# Patient Record
Sex: FEMALE | Race: WHITE | Employment: OTHER | URBAN - METROPOLITAN AREA
[De-identification: names, ages, dates, MRNs, and addresses within clinical notes are randomized per-mention and may not be internally consistent; named-entity substitution may affect disease eponyms.]

---

## 2017-04-04 ENCOUNTER — APPOINTMENT (OUTPATIENT)
Dept: GENERAL RADIOLOGY | Age: 82
DRG: 176 | End: 2017-04-04
Attending: EMERGENCY MEDICINE
Payer: COMMERCIAL

## 2017-04-04 ENCOUNTER — APPOINTMENT (OUTPATIENT)
Dept: CT IMAGING | Age: 82
DRG: 176 | End: 2017-04-04
Attending: EMERGENCY MEDICINE
Payer: COMMERCIAL

## 2017-04-04 ENCOUNTER — HOSPITAL ENCOUNTER (INPATIENT)
Age: 82
LOS: 10 days | Discharge: NURSING, REHAB OR LONG-TERM CARE | DRG: 176 | End: 2017-04-14
Attending: EMERGENCY MEDICINE | Admitting: INTERNAL MEDICINE
Payer: COMMERCIAL

## 2017-04-04 DIAGNOSIS — I48.92 ATRIAL FLUTTER WITH RAPID VENTRICULAR RESPONSE (HCC): ICD-10-CM

## 2017-04-04 DIAGNOSIS — I26.99 PE (PULMONARY THROMBOEMBOLISM) (HCC): ICD-10-CM

## 2017-04-04 DIAGNOSIS — N17.9 ACUTE RENAL FAILURE, UNSPECIFIED ACUTE RENAL FAILURE TYPE (HCC): ICD-10-CM

## 2017-04-04 DIAGNOSIS — Z66 DO NOT RESUSCITATE: Chronic | ICD-10-CM

## 2017-04-04 DIAGNOSIS — I48.91 ATRIAL FIBRILLATION, UNSPECIFIED TYPE (HCC): ICD-10-CM

## 2017-04-04 DIAGNOSIS — J90 PLEURAL EFFUSION: ICD-10-CM

## 2017-04-04 DIAGNOSIS — R09.02 HYPOXEMIA: ICD-10-CM

## 2017-04-04 DIAGNOSIS — I26.99 OTHER ACUTE PULMONARY EMBOLISM: Primary | ICD-10-CM

## 2017-04-04 DIAGNOSIS — E83.42 HYPOMAGNESEMIA: ICD-10-CM

## 2017-04-04 DIAGNOSIS — J45.909 UNCOMPLICATED ASTHMA, UNSPECIFIED ASTHMA SEVERITY: ICD-10-CM

## 2017-04-04 DIAGNOSIS — R10.30 LOWER ABDOMINAL PAIN: ICD-10-CM

## 2017-04-04 DIAGNOSIS — R53.81 DEBILITY: ICD-10-CM

## 2017-04-04 LAB
ALBUMIN SERPL BCP-MCNC: 3.8 G/DL (ref 3.2–4.6)
ALBUMIN/GLOB SERPL: 1.1 {RATIO} (ref 1.2–3.5)
ALP SERPL-CCNC: 74 U/L (ref 50–136)
ALT SERPL-CCNC: 37 U/L (ref 12–65)
ANION GAP BLD CALC-SCNC: 6 MMOL/L (ref 7–16)
APTT PPP: >110 SEC (ref 23.5–31.7)
AST SERPL W P-5'-P-CCNC: 41 U/L (ref 15–37)
ATRIAL RATE: 158 BPM
ATRIAL RATE: 158 BPM
BASOPHILS # BLD AUTO: 0 K/UL (ref 0–0.2)
BASOPHILS # BLD: 0 % (ref 0–2)
BILIRUB SERPL-MCNC: 0.6 MG/DL (ref 0.2–1.1)
BNP SERPL-MCNC: 587 PG/ML
BUN SERPL-MCNC: 21 MG/DL (ref 8–23)
CALCIUM SERPL-MCNC: 8.3 MG/DL (ref 8.3–10.4)
CALCULATED P AXIS, ECG09: -131 DEGREES
CALCULATED P AXIS, ECG09: 143 DEGREES
CALCULATED R AXIS, ECG10: 100 DEGREES
CALCULATED R AXIS, ECG10: 99 DEGREES
CALCULATED T AXIS, ECG11: -101 DEGREES
CALCULATED T AXIS, ECG11: -164 DEGREES
CHLORIDE SERPL-SCNC: 108 MMOL/L (ref 98–107)
CO2 SERPL-SCNC: 30 MMOL/L (ref 21–32)
CREAT SERPL-MCNC: 1.21 MG/DL (ref 0.6–1)
D DIMER PPP FEU-MCNC: 6.32 UG/ML(FEU)
DIAGNOSIS, 93000: NORMAL
DIAGNOSIS, 93000: NORMAL
DIFFERENTIAL METHOD BLD: ABNORMAL
EOSINOPHIL # BLD: 0 K/UL (ref 0–0.8)
EOSINOPHIL NFR BLD: 1 % (ref 0.5–7.8)
ERYTHROCYTE [DISTWIDTH] IN BLOOD BY AUTOMATED COUNT: 14.3 % (ref 11.9–14.6)
GLOBULIN SER CALC-MCNC: 3.4 G/DL (ref 2.3–3.5)
GLUCOSE BLD STRIP.AUTO-MCNC: 168 MG/DL (ref 65–100)
GLUCOSE SERPL-MCNC: 133 MG/DL (ref 65–100)
HCT VFR BLD AUTO: 34.2 % (ref 35.8–46.3)
HGB BLD-MCNC: 10.8 G/DL (ref 11.7–15.4)
IMM GRANULOCYTES # BLD: 0 K/UL (ref 0–0.5)
IMM GRANULOCYTES NFR BLD AUTO: 0 % (ref 0–5)
INR PPP: 1.3 (ref 0.9–1.2)
LYMPHOCYTES # BLD AUTO: 18 % (ref 13–44)
LYMPHOCYTES # BLD: 1.1 K/UL (ref 0.5–4.6)
MAGNESIUM SERPL-MCNC: 1.6 MG/DL (ref 1.8–2.4)
MCH RBC QN AUTO: 32.3 PG (ref 26.1–32.9)
MCHC RBC AUTO-ENTMCNC: 31.6 G/DL (ref 31.4–35)
MCV RBC AUTO: 102.4 FL (ref 79.6–97.8)
MONOCYTES # BLD: 0.3 K/UL (ref 0.1–1.3)
MONOCYTES NFR BLD AUTO: 6 % (ref 4–12)
NEUTS SEG # BLD: 4.3 K/UL (ref 1.7–8.2)
NEUTS SEG NFR BLD AUTO: 75 % (ref 43–78)
P-R INTERVAL, ECG05: 128 MS
P-R INTERVAL, ECG05: 132 MS
PHOSPHATE SERPL-MCNC: 3.1 MG/DL (ref 2.3–3.7)
PLATELET # BLD AUTO: 169 K/UL (ref 150–450)
PMV BLD AUTO: 9.5 FL (ref 10.8–14.1)
POTASSIUM SERPL-SCNC: 4.1 MMOL/L (ref 3.5–5.1)
PROT SERPL-MCNC: 7.2 G/DL (ref 6.3–8.2)
PROTHROMBIN TIME: 14.5 SEC (ref 9.6–12)
Q-T INTERVAL, ECG07: 258 MS
Q-T INTERVAL, ECG07: 258 MS
QRS DURATION, ECG06: 80 MS
QRS DURATION, ECG06: 80 MS
QTC CALCULATION (BEZET), ECG08: 418 MS
QTC CALCULATION (BEZET), ECG08: 418 MS
RBC # BLD AUTO: 3.34 M/UL (ref 4.05–5.25)
SODIUM SERPL-SCNC: 144 MMOL/L (ref 136–145)
TROPONIN I BLD-MCNC: 0.04 NG/ML (ref 0–0.08)
VENTRICULAR RATE, ECG03: 158 BPM
VENTRICULAR RATE, ECG03: 158 BPM
WBC # BLD AUTO: 5.8 K/UL (ref 4.3–11.1)

## 2017-04-04 PROCEDURE — 77030014007 HC SPNG HEMSTAT J&J -B

## 2017-04-04 PROCEDURE — 74011000250 HC RX REV CODE- 250: Performed by: INTERNAL MEDICINE

## 2017-04-04 PROCEDURE — 94760 N-INVAS EAR/PLS OXIMETRY 1: CPT

## 2017-04-04 PROCEDURE — 96367 TX/PROPH/DG ADDL SEQ IV INF: CPT | Performed by: EMERGENCY MEDICINE

## 2017-04-04 PROCEDURE — 83735 ASSAY OF MAGNESIUM: CPT | Performed by: EMERGENCY MEDICINE

## 2017-04-04 PROCEDURE — 93005 ELECTROCARDIOGRAM TRACING: CPT | Performed by: EMERGENCY MEDICINE

## 2017-04-04 PROCEDURE — 74011250636 HC RX REV CODE- 250/636: Performed by: EMERGENCY MEDICINE

## 2017-04-04 PROCEDURE — 74011636320 HC RX REV CODE- 636/320: Performed by: EMERGENCY MEDICINE

## 2017-04-04 PROCEDURE — 74011000258 HC RX REV CODE- 258: Performed by: EMERGENCY MEDICINE

## 2017-04-04 PROCEDURE — 85379 FIBRIN DEGRADATION QUANT: CPT | Performed by: EMERGENCY MEDICINE

## 2017-04-04 PROCEDURE — 77030013579 HC DRSG WND CA ALG BMS -A

## 2017-04-04 PROCEDURE — 82962 GLUCOSE BLOOD TEST: CPT

## 2017-04-04 PROCEDURE — 77010033678 HC OXYGEN DAILY

## 2017-04-04 PROCEDURE — 85610 PROTHROMBIN TIME: CPT | Performed by: EMERGENCY MEDICINE

## 2017-04-04 PROCEDURE — 80053 COMPREHEN METABOLIC PANEL: CPT | Performed by: EMERGENCY MEDICINE

## 2017-04-04 PROCEDURE — 94640 AIRWAY INHALATION TREATMENT: CPT

## 2017-04-04 PROCEDURE — 74011000250 HC RX REV CODE- 250: Performed by: EMERGENCY MEDICINE

## 2017-04-04 PROCEDURE — 85730 THROMBOPLASTIN TIME PARTIAL: CPT | Performed by: EMERGENCY MEDICINE

## 2017-04-04 PROCEDURE — 71010 XR CHEST PORT: CPT

## 2017-04-04 PROCEDURE — 74011000258 HC RX REV CODE- 258: Performed by: NURSE PRACTITIONER

## 2017-04-04 PROCEDURE — 99285 EMERGENCY DEPT VISIT HI MDM: CPT | Performed by: EMERGENCY MEDICINE

## 2017-04-04 PROCEDURE — 83880 ASSAY OF NATRIURETIC PEPTIDE: CPT | Performed by: EMERGENCY MEDICINE

## 2017-04-04 PROCEDURE — 84100 ASSAY OF PHOSPHORUS: CPT | Performed by: EMERGENCY MEDICINE

## 2017-04-04 PROCEDURE — 96365 THER/PROPH/DIAG IV INF INIT: CPT | Performed by: EMERGENCY MEDICINE

## 2017-04-04 PROCEDURE — 36415 COLL VENOUS BLD VENIPUNCTURE: CPT | Performed by: EMERGENCY MEDICINE

## 2017-04-04 PROCEDURE — 74011250636 HC RX REV CODE- 250/636: Performed by: INTERNAL MEDICINE

## 2017-04-04 PROCEDURE — 74011250637 HC RX REV CODE- 250/637: Performed by: NURSE PRACTITIONER

## 2017-04-04 PROCEDURE — 74011250636 HC RX REV CODE- 250/636: Performed by: NURSE PRACTITIONER

## 2017-04-04 PROCEDURE — 84484 ASSAY OF TROPONIN QUANT: CPT

## 2017-04-04 PROCEDURE — 96375 TX/PRO/DX INJ NEW DRUG ADDON: CPT | Performed by: EMERGENCY MEDICINE

## 2017-04-04 PROCEDURE — 65660000000 HC RM CCU STEPDOWN

## 2017-04-04 PROCEDURE — 71260 CT THORAX DX C+: CPT

## 2017-04-04 PROCEDURE — 96361 HYDRATE IV INFUSION ADD-ON: CPT | Performed by: EMERGENCY MEDICINE

## 2017-04-04 PROCEDURE — 74011250637 HC RX REV CODE- 250/637: Performed by: INTERNAL MEDICINE

## 2017-04-04 PROCEDURE — 99223 1ST HOSP IP/OBS HIGH 75: CPT | Performed by: INTERNAL MEDICINE

## 2017-04-04 PROCEDURE — 85025 COMPLETE CBC W/AUTO DIFF WBC: CPT | Performed by: EMERGENCY MEDICINE

## 2017-04-04 PROCEDURE — 74011636637 HC RX REV CODE- 636/637: Performed by: INTERNAL MEDICINE

## 2017-04-04 PROCEDURE — 76450000000

## 2017-04-04 RX ORDER — AMIODARONE HYDROCHLORIDE 150 MG/3ML
150 INJECTION, SOLUTION INTRAVENOUS
Status: COMPLETED | OUTPATIENT
Start: 2017-04-04 | End: 2017-04-04

## 2017-04-04 RX ORDER — SODIUM CHLORIDE 0.9 % (FLUSH) 0.9 %
5-10 SYRINGE (ML) INJECTION AS NEEDED
Status: DISCONTINUED | OUTPATIENT
Start: 2017-04-04 | End: 2017-04-05 | Stop reason: SDUPTHER

## 2017-04-04 RX ORDER — LISINOPRIL 20 MG/1
20 TABLET ORAL DAILY
Status: DISCONTINUED | OUTPATIENT
Start: 2017-04-05 | End: 2017-04-06

## 2017-04-04 RX ORDER — LEVALBUTEROL INHALATION SOLUTION 0.63 MG/3ML
0.63 SOLUTION RESPIRATORY (INHALATION)
Status: DISCONTINUED | OUTPATIENT
Start: 2017-04-04 | End: 2017-04-14 | Stop reason: HOSPADM

## 2017-04-04 RX ORDER — ADENOSINE 3 MG/ML
6 INJECTION, SOLUTION INTRAVENOUS ONCE
Status: COMPLETED | OUTPATIENT
Start: 2017-04-04 | End: 2017-04-04

## 2017-04-04 RX ORDER — SODIUM CHLORIDE 0.9 % (FLUSH) 0.9 %
5-10 SYRINGE (ML) INJECTION EVERY 8 HOURS
Status: DISCONTINUED | OUTPATIENT
Start: 2017-04-04 | End: 2017-04-05 | Stop reason: SDUPTHER

## 2017-04-04 RX ORDER — HYDROCHLOROTHIAZIDE 25 MG/1
25 TABLET ORAL DAILY
Status: DISCONTINUED | OUTPATIENT
Start: 2017-04-05 | End: 2017-04-07

## 2017-04-04 RX ORDER — FUROSEMIDE 10 MG/ML
40 INJECTION INTRAMUSCULAR; INTRAVENOUS ONCE
Status: COMPLETED | OUTPATIENT
Start: 2017-04-04 | End: 2017-04-04

## 2017-04-04 RX ORDER — DILTIAZEM HYDROCHLORIDE 5 MG/ML
10 INJECTION INTRAVENOUS ONCE
Status: COMPLETED | OUTPATIENT
Start: 2017-04-04 | End: 2017-04-04

## 2017-04-04 RX ORDER — ATORVASTATIN CALCIUM 40 MG/1
40 TABLET, FILM COATED ORAL
Status: DISCONTINUED | OUTPATIENT
Start: 2017-04-04 | End: 2017-04-14 | Stop reason: HOSPADM

## 2017-04-04 RX ORDER — SODIUM CHLORIDE 0.9 % (FLUSH) 0.9 %
5-10 SYRINGE (ML) INJECTION AS NEEDED
Status: DISCONTINUED | OUTPATIENT
Start: 2017-04-04 | End: 2017-04-14 | Stop reason: HOSPADM

## 2017-04-04 RX ORDER — HEPARIN SODIUM 5000 [USP'U]/ML
4000 INJECTION, SOLUTION INTRAVENOUS; SUBCUTANEOUS
Status: COMPLETED | OUTPATIENT
Start: 2017-04-04 | End: 2017-04-04

## 2017-04-04 RX ORDER — LORATADINE 10 MG/1
10 TABLET ORAL DAILY
Status: DISCONTINUED | OUTPATIENT
Start: 2017-04-05 | End: 2017-04-14 | Stop reason: HOSPADM

## 2017-04-04 RX ORDER — LORAZEPAM 2 MG/ML
1 INJECTION INTRAMUSCULAR
Status: DISCONTINUED | OUTPATIENT
Start: 2017-04-04 | End: 2017-04-10

## 2017-04-04 RX ORDER — MAGNESIUM SULFATE 1 G/100ML
1 INJECTION INTRAVENOUS ONCE
Status: COMPLETED | OUTPATIENT
Start: 2017-04-04 | End: 2017-04-04

## 2017-04-04 RX ORDER — ACETAMINOPHEN 325 MG/1
650 TABLET ORAL
Status: DISCONTINUED | OUTPATIENT
Start: 2017-04-04 | End: 2017-04-14 | Stop reason: HOSPADM

## 2017-04-04 RX ORDER — HEPARIN SODIUM 5000 [USP'U]/100ML
18-36 INJECTION, SOLUTION INTRAVENOUS
Status: DISCONTINUED | OUTPATIENT
Start: 2017-04-04 | End: 2017-04-05

## 2017-04-04 RX ORDER — CALCIUM GLUCONATE 94 MG/ML
1 INJECTION, SOLUTION INTRAVENOUS
Status: COMPLETED | OUTPATIENT
Start: 2017-04-04 | End: 2017-04-04

## 2017-04-04 RX ORDER — SODIUM CHLORIDE 0.9 % (FLUSH) 0.9 %
10 SYRINGE (ML) INJECTION
Status: COMPLETED | OUTPATIENT
Start: 2017-04-04 | End: 2017-04-04

## 2017-04-04 RX ORDER — NIACIN 500 MG/1
500 TABLET, EXTENDED RELEASE ORAL
Status: DISCONTINUED | OUTPATIENT
Start: 2017-04-04 | End: 2017-04-14 | Stop reason: HOSPADM

## 2017-04-04 RX ORDER — SODIUM CHLORIDE 0.9 % (FLUSH) 0.9 %
5-10 SYRINGE (ML) INJECTION EVERY 8 HOURS
Status: DISCONTINUED | OUTPATIENT
Start: 2017-04-04 | End: 2017-04-14 | Stop reason: HOSPADM

## 2017-04-04 RX ORDER — AMIODARONE HYDROCHLORIDE 150 MG/3ML
INJECTION, SOLUTION INTRAVENOUS
Status: ACTIVE
Start: 2017-04-04 | End: 2017-04-05

## 2017-04-04 RX ORDER — SODIUM CHLORIDE 9 MG/ML
2000 INJECTION, SOLUTION INTRAVENOUS ONCE
Status: COMPLETED | OUTPATIENT
Start: 2017-04-04 | End: 2017-04-04

## 2017-04-04 RX ORDER — PANTOPRAZOLE SODIUM 40 MG/1
40 TABLET, DELAYED RELEASE ORAL
Status: DISCONTINUED | OUTPATIENT
Start: 2017-04-05 | End: 2017-04-14 | Stop reason: HOSPADM

## 2017-04-04 RX ORDER — METFORMIN HYDROCHLORIDE 500 MG/1
500 TABLET ORAL 2 TIMES DAILY
Status: DISCONTINUED | OUTPATIENT
Start: 2017-04-04 | End: 2017-04-04

## 2017-04-04 RX ADMIN — AMIODARONE HYDROCHLORIDE 150 MG: 50 INJECTION, SOLUTION INTRAVENOUS at 13:03

## 2017-04-04 RX ADMIN — AMIODARONE HYDROCHLORIDE 0.5 MG/MIN: 50 INJECTION, SOLUTION INTRAVENOUS at 22:22

## 2017-04-04 RX ADMIN — ACETAMINOPHEN 650 MG: 325 TABLET, FILM COATED ORAL at 23:08

## 2017-04-04 RX ADMIN — ATORVASTATIN CALCIUM 40 MG: 40 TABLET, FILM COATED ORAL at 23:08

## 2017-04-04 RX ADMIN — DILTIAZEM HYDROCHLORIDE 10 MG: 5 INJECTION INTRAVENOUS at 12:22

## 2017-04-04 RX ADMIN — HEPARIN SODIUM AND DEXTROSE 18 UNITS/KG/HR: 5000; 5 INJECTION INTRAVENOUS at 15:27

## 2017-04-04 RX ADMIN — IOPAMIDOL 100 ML: 755 INJECTION, SOLUTION INTRAVENOUS at 13:54

## 2017-04-04 RX ADMIN — AMIODARONE HYDROCHLORIDE 1 MG/MIN: 50 INJECTION, SOLUTION INTRAVENOUS at 14:08

## 2017-04-04 RX ADMIN — AMIODARONE HYDROCHLORIDE 150 MG: 50 INJECTION, SOLUTION INTRAVENOUS at 13:13

## 2017-04-04 RX ADMIN — SODIUM CHLORIDE 2000 ML/HR: 900 INJECTION, SOLUTION INTRAVENOUS at 12:43

## 2017-04-04 RX ADMIN — Medication 5 ML: at 23:08

## 2017-04-04 RX ADMIN — FUROSEMIDE 40 MG: 10 INJECTION, SOLUTION INTRAMUSCULAR; INTRAVENOUS at 17:16

## 2017-04-04 RX ADMIN — MAGNESIUM SULFATE HEPTAHYDRATE 1 G: 1 INJECTION, SOLUTION INTRAVENOUS at 14:11

## 2017-04-04 RX ADMIN — Medication 5 ML: at 14:15

## 2017-04-04 RX ADMIN — Medication 10 ML: at 13:54

## 2017-04-04 RX ADMIN — HEPARIN SODIUM 4000 UNITS: 5000 INJECTION, SOLUTION INTRAVENOUS; SUBCUTANEOUS at 15:26

## 2017-04-04 RX ADMIN — SODIUM CHLORIDE 100 ML: 900 INJECTION, SOLUTION INTRAVENOUS at 13:54

## 2017-04-04 RX ADMIN — HUMAN INSULIN 2 UNITS: 100 INJECTION, SOLUTION SUBCUTANEOUS at 23:08

## 2017-04-04 RX ADMIN — ADENOSINE 6 MG: 3 INJECTION, SOLUTION INTRAVENOUS at 12:38

## 2017-04-04 RX ADMIN — LEVALBUTEROL HYDROCHLORIDE 0.63 MG: 0.63 SOLUTION RESPIRATORY (INHALATION) at 22:18

## 2017-04-04 RX ADMIN — CALCIUM GLUCONATE 1 G: 94 INJECTION, SOLUTION INTRAVENOUS at 13:24

## 2017-04-04 RX ADMIN — NIACIN 500 MG: 500 TABLET, FILM COATED, EXTENDED RELEASE ORAL at 23:08

## 2017-04-04 NOTE — ED TRIAGE NOTES
Pt reports right shoulder pain for an undetermined amount of time. Pt denies any trauma. Pt describes the pain as someone pulling her shoulder apart.

## 2017-04-04 NOTE — IP AVS SNAPSHOT
Current Discharge Medication List  
  
START taking these medications Dose & Instructions Dispensing Information Comments Morning Noon Evening Bedtime  
 amiodarone 200 mg tablet Commonly known as:  CORDARONE Your last dose was: Your next dose is:    
   
   
 Dose:  200 mg Take 1 Tab by mouth every twelve (12) hours. Quantity:  60 Tab Refills:  11  
     
   
   
   
  
 apixaban 5 mg tablet Commonly known as:  Bedmelony Doll Your last dose was: Your next dose is:    
   
   
 Dose:  5 mg Take 1 Tab by mouth every twelve (12) hours. Quantity:  60 Tab Refills:  11  
     
   
   
   
  
 docusate sodium 100 mg capsule Commonly known as:  Charlemont Given Your last dose was: Your next dose is:    
   
   
 Dose:  100 mg Take 1 Cap by mouth two (2) times a day. Quantity:  60 Cap Refills:  11  
     
   
   
   
  
 levalbuterol 0.63 mg/3 mL Nebu Commonly known as:  Harlo Slates Your last dose was: Your next dose is:    
   
   
 Dose:  0.63 mg  
3 mL by Nebulization route every six (6) hours as needed. Quantity:  [address] Refills:  11  
     
   
   
   
  
 metoprolol tartrate 25 mg tablet Commonly known as:  LOPRESSOR Your last dose was: Your next dose is:    
   
   
 Dose:  25 mg Take 1 Tab by mouth every six (6) hours. Quantity:  120 Tab Refills:  11  
     
   
   
   
  
 sodium chloride 0.65 % nasal spray Commonly known as:  OCEAN Your last dose was: Your next dose is:    
   
   
 Dose:  1 Spray 1 [place] by Both Nostrils route as needed for Congestion (Keep at bedside for PRN use.). Quantity:  15 mL Refills:  11 CONTINUE these medications which have NOT CHANGED Dose & Instructions Dispensing Information Comments Morning Noon Evening Bedtime  
 acetaminophen 650 mg CR tablet Commonly known as:  TYLENOL Your last dose was: Your next dose is:    
   
   
 Dose:  650 mg Take 1 Tab by mouth every eight (8) hours. 2 TABS Quantity:  90 Tab Refills:  11  
     
   
   
   
  
 atorvastatin 40 mg tablet Commonly known as:  LIPITOR Your last dose was: Your next dose is:    
   
   
 Dose:  40 mg Take 1 Tab by mouth nightly. Quantity:  90 Tab Refills:  3 CALCIUM-VITAMIN D2 PO Your last dose was: Your next dose is: Take  by mouth three (3) times daily. Refills:  0  
     
   
   
   
  
 * CERTAVITE SENIOR-ANTIOXIDANT 0.4-300-250 mg-mcg-mcg Tab Generic drug:  multivit-min-FA-lycopen-lutein Your last dose was: Your next dose is: Take  by mouth daily. Refills:  0  
     
   
   
   
  
 * CERTAVITE SENIOR-ANTIOXIDANT 0.4-300-250 mg-mcg-mcg Tab Generic drug:  multivit-min-FA-lycopen-lutein Your last dose was: Your next dose is: Take 1 Tab by mouth daily. Quantity:  90 Tab Refills:  3 ENTERIC COATED ASPIRIN PO Your last dose was: Your next dose is:    
   
   
 Dose:  81 mg Take 81 mg by mouth daily. Refills:  0  
     
   
   
   
  
 fluticasone-salmeterol 250-50 mcg/dose diskus inhaler Commonly known as:  ADVAIR DISKUS Your last dose was: Your next dose is:    
   
   
 Dose:  1 Puff Take 1 Puff by inhalation two (2) times a day. Quantity:  1 Inhaler Refills:  11  
     
   
   
   
  
 loratadine 10 mg tablet Commonly known as:  Steve Landon Your last dose was: Your next dose is:    
   
   
 Dose:  10 mg Take 1 Tab by mouth daily. Quantity:  90 Tab Refills:  3  
     
   
   
   
  
 niacin  mg tablet Commonly known as:  Niaspan Your last dose was: Your next dose is:    
   
   
 Dose:  500 mg Take 1 Tab by mouth nightly. Quantity:  90 Tab Refills:  3 pantoprazole 20 mg tablet Commonly known as:  PROTONIX Your last dose was: Your next dose is:    
   
   
 Dose:  20 mg Take 1 Tab by mouth daily. Indications: HEARTBURN Quantity:  90 Tab Refills:  2  
     
   
   
   
  
 * Notice: This list has 2 medication(s) that are the same as other medications prescribed for you. Read the directions carefully, and ask your doctor or other care provider to review them with you. STOP taking these medications   
 amLODIPine 2.5 mg tablet Commonly known as:  NORVASC  
   
  
 hydroCHLOROthiazide 25 mg tablet Commonly known as:  HYDRODIURIL  
   
  
 lisinopril 20 mg tablet Commonly known as:  PRINIVIL, ZESTRIL  
   
  
 metFORMIN 500 mg tablet Commonly known as:  GLUCOPHAGE Where to Get Your Medications These medications were sent to [address] [place] [address] Phone:  [phone]  
  amiodarone 200 mg tablet  
 apixaban 5 mg tablet  
 docusate sodium 100 mg capsule  
 levalbuterol 0.63 mg/3 mL Nebu  
 metoprolol tartrate 25 mg tablet  
 sodium chloride 0.65 % nasal spray

## 2017-04-04 NOTE — ED PROVIDER NOTES
HPI Comments: 29-year-old female presents with shortness of breath the past several days. She was seen by her doctor yesterday and told she had \"something wrong with her heart. \"  She has had a cough for the past 2-4 weeks with increasing bilateral leg swelling. Her doctor prescribed Lasix, but she has not yet been to the pharmacy to pick this up. She denies any chest pain, does have right shoulder pain for 2 weeks with movement only. She denies any chest pain or fever. Cough is occasionally productive of clear and white sputum. She denies fevers but has had sweats. Patient is a 80 y.o. female presenting with shoulder pain. The history is provided by the patient and a relative. Shoulder Pain           Past Medical History:   Diagnosis Date    Asthma 7/1/2015    Heartburn 7/1/2015    Hyperlipidemia 7/1/2015    Hypertension 7/1/2015    Type 2 diabetes mellitus (HCC)     Urge incontinence 7/1/2015       Past Surgical History:   Procedure Laterality Date    HX CATARACT REMOVAL  2011         Family History:   Problem Relation Age of Onset    Diabetes Father     Diabetes Other     Cancer Other     Breast Cancer Child      2 daughters had breast cancer    Breast Cancer Sister        Social History     Social History    Marital status: SINGLE     Spouse name: N/A    Number of children: N/A    Years of education: N/A     Occupational History    Not on file. Social History Main Topics    Smoking status: Never Smoker    Smokeless tobacco: Never Used    Alcohol use No    Drug use: No    Sexual activity: Not on file     Other Topics Concern    Not on file     Social History Narrative         ALLERGIES: Review of patient's allergies indicates no known allergies. Review of Systems   Constitutional: Positive for diaphoresis and fatigue. Negative for chills and fever. HENT: Negative for hearing loss. Eyes: Negative for visual disturbance.    Respiratory: Positive for cough and shortness of breath. Cardiovascular: Negative for chest pain and palpitations. Gastrointestinal: Negative for abdominal pain, diarrhea, nausea and vomiting. Musculoskeletal: Positive for arthralgias. Negative for back pain and joint swelling. Skin: Negative for rash. Neurological: Negative for weakness and headaches. Psychiatric/Behavioral: Negative for confusion. Vitals:    04/04/17 1157 04/04/17 1225 04/04/17 1238 04/04/17 1245   BP: 115/78 108/75 (!) 85/57 103/71   Pulse: (!) 160 (!) 156 (!) 157 (!) 155   Resp: 20 22 28   Temp: 98 °F (36.7 °C)      SpO2: 99% 94%  96%   Weight: 63.5 kg (140 lb)      Height: 5' 1\" (1.549 m)               Physical Exam   Constitutional: She appears well-developed and well-nourished. HENT:   Head: Normocephalic and atraumatic. Right Ear: External ear normal.   Left Ear: External ear normal.   Nose: Nose normal.   Mouth/Throat: Oropharynx is clear and moist.   Eyes: Conjunctivae are normal. Pupils are equal, round, and reactive to light. Neck: Normal range of motion. Neck supple. Cardiovascular: Regular rhythm, normal heart sounds and intact distal pulses. Tachycardia present. Pulmonary/Chest: Effort normal and breath sounds normal. No respiratory distress. She has no wheezes. Abdominal: Soft. Bowel sounds are normal. She exhibits no distension. There is no tenderness. Musculoskeletal: Normal range of motion. She exhibits edema. 2+ bilateral pitting edema   Neurological: She is alert. Skin: Skin is warm and dry. No rash noted. Psychiatric: Judgment normal.   Nursing note and vitals reviewed. MDM  Number of Diagnoses or Management Options  Diagnosis management comments: Parts of this document were created using dragon voice recognition software. The chart has been reviewed but errors may still be present. Initial rate 150s to 160s with suspicion for atrial flutter.   Initially given Cardizem with drop in blood pressure to the 80s, but no improvement in rate. Then given adenosine which uncovered atrial flutter with variable block. Rate quickly ascended back again to the 150s. Given amiodarone with improvement in rate 130s. D-dimer elevated, but patient was not stable enough to go to CT. Discussed with cardiologist, Dr. Bibiana Lino. He suggested second bolus of amiodarone followed by amiodarone drip in order to obtain CT scan. Mag replaced.    ===================================================================  This patient is critically ill and there is a high probability of of imminent or life threatening deterioration in the patient's condition without immediate management. The nature of the patient's clinical problem is: aflutter with rapid rate    I have spent 45 minutes in direct patient care, documentation, review of labs/xrays/old records, discussion with Family, staff . The time involved in the performance of separately reportable procedures was not counted toward critical care time.      Willow Suárez MD; 4/4/2017 @1:18 PM  ===================================================================           Amount and/or Complexity of Data Reviewed  Clinical lab tests: ordered and reviewed (Results for orders placed or performed during the hospital encounter of 04/04/17  -CBC WITH AUTOMATED DIFF       Result                                            Value                         Ref Range                       WBC                                               5.8                           4.3 - 11.1 K/uL                 RBC                                               3.34 (L)                      4.05 - 5.25 M/uL                HGB                                               10.8 (L)                      11.7 - 15.4 g/dL                HCT                                               34.2 (L)                      35.8 - 46.3 %                   MCV                                               102.4 (H)                     79.6 - 97.8 FL                  MCH                                               32.3                          26.1 - 32.9 PG                  MCHC                                              31.6                          31.4 - 35.0 g/dL                RDW                                               14.3                          11.9 - 14.6 %                   PLATELET                                          169                           150 - 450 K/uL                  MPV                                               9.5 (L)                       10.8 - 14.1 FL                  DF                                                AUTOMATED                                                     NEUTROPHILS                                       75                            43 - 78 %                       LYMPHOCYTES                                       18                            13 - 44 %                       MONOCYTES                                         6                             4.0 - 12.0 %                    EOSINOPHILS                                       1                             0.5 - 7.8 %                     BASOPHILS                                         0                             0.0 - 2.0 %                     IMMATURE GRANULOCYTES                             0.0                           0.0 - 5.0 %                     ABS. NEUTROPHILS                                  4.3                           1.7 - 8.2 K/UL                  ABS. LYMPHOCYTES                                  1.1                           0.5 - 4.6 K/UL                  ABS. MONOCYTES                                    0.3                           0.1 - 1.3 K/UL                  ABS. EOSINOPHILS                                  0.0                           0.0 - 0.8 K/UL                  ABS. BASOPHILS                                    0.0                           0.0 - 0.2 K/UL                  ABS. IMM. GRANS. 0.0                           0.0 - 0.5 K/UL             -METABOLIC PANEL, COMPREHENSIVE       Result                                            Value                         Ref Range                       Sodium                                            144                           136 - 145 mmol/L                Potassium                                         4.1                           3.5 - 5.1 mmol/L                Chloride                                          108 (H)                       98 - 107 mmol/L                 CO2                                               30                            21 - 32 mmol/L                  Anion gap                                         6 (L)                         7 - 16 mmol/L                   Glucose                                           133 (H)                       65 - 100 mg/dL                  BUN                                               21                            8 - 23 MG/DL                    Creatinine                                        1.21 (H)                      0.6 - 1.0 MG/DL                 GFR est AA                                        54 (L)                        >60 ml/min/1.73m2               GFR est non-AA                                    44 (L)                        >60 ml/min/1.73m2               Calcium                                           8.3                           8.3 - 10.4 MG/DL                Bilirubin, total                                  0.6                           0.2 - 1.1 MG/DL                 ALT (SGPT)                                        37                            12 - 65 U/L                     AST (SGOT)                                        41 (H)                        15 - 37 U/L                     Alk. phosphatase                                  74                            50 - 136 U/L                    Protein, total 7.2                           6.3 - 8.2 g/dL                  Albumin                                           3.8                           3.2 - 4.6 g/dL                  Globulin                                          3.4                           2.3 - 3.5 g/dL                  A-G Ratio                                         1.1 (L)                       1.2 - 3.5                  -MAGNESIUM       Result                                            Value                         Ref Range                       Magnesium                                         1.6 (L)                       1.8 - 2.4 mg/dL            -PHOSPHORUS       Result                                            Value                         Ref Range                       Phosphorus                                        3.1                           2.3 - 3.7 MG/DL            -BNP       Result                                            Value                         Ref Range                       BNP                                               587                           pg/mL                      -D DIMER       Result                                            Value                         Ref Range                       D DIMER                                           6.32 (HH)                     <0.55 ug/ml(FEU)           -POC TROPONIN-I       Result                                            Value                         Ref Range                       Troponin-I (POC)                                  0.04                          0.0 - 0.08 ng/ml           -EKG, 12 LEAD, INITIAL       Result                                            Value                         Ref Range                       Ventricular Rate                                  158                           BPM                             Atrial Rate                                       158                           BPM P-R Interval                                      132                           ms                              QRS Duration                                      80                            ms                              Q-T Interval                                      258                           ms                              QTC Calculation (Bezet)                           418                           ms                              Calculated P Axis                                 143                           degrees                         Calculated R Axis                                 100                           degrees                         Calculated T Axis                                 -164                          degrees                         Diagnosis                                                                                                   !!! Suspect arm lead reversal, interpretation assumes no reversal   !! AGE AND GENDER SPECIFIC ECG ANALYSIS !!   Unusual P axis, possible ectopic atrial tachycardia   Rightward axis   Nonspecific ST and T wave abnormality   Abnormal ECG   When compared with ECG of 04-APR-2017 12:09,   No significant change was found     -EKG, 12 LEAD, SUBSEQUENT       Result                                            Value                         Ref Range                       Ventricular Rate                                  158                           BPM                             Atrial Rate                                       158                           BPM                             P-R Interval                                      128                           ms                              QRS Duration                                      80                            ms                              Q-T Interval                                      258                           ms                              QTC Calculation (Bezet) 418                           ms                              Calculated P Axis                                 -131                          degrees                         Calculated R Axis                                 99                            degrees                         Calculated T Axis                                 -101                          degrees                         Diagnosis                                                                                                   !! AGE AND GENDER SPECIFIC ECG ANALYSIS !!   Unusual P axis, possible ectopic atrial tachycardia   Rightward axis   RSR' or QR pattern in V1 suggests right ventricular conduction delay   Anterior infarct , age undetermined   Abnormal ECG   No previous ECGs available     )  Tests in the radiology section of CPT®: ordered and reviewed (Xr Chest Port    Result Date: 4/4/2017  PORTABLE CHEST, April 4, 2017 at 1252 hours CLINICAL HISTORY:  Severe chest pain. COMPARISON:  None. FINDINGS:  AP erect image demonstrates mild right basilar atelectasis/infiltrate. The left base is obscured by an overlying support device. The heart is enlarged without evidence of congestive heart failure or pneumothorax. The bony thorax appears intact on this view. IMPRESSION:  TECHNICALLY LIMITED EXAMINATION DEMONSTRATES CARDIOMEGALY WITH PATCHY RIGHT BASILAR ATELECTASIS/INFILTRATE.    )  Tests in the medicine section of CPT®: ordered and reviewed  Review and summarize past medical records: yes  Discuss the patient with other providers: yes  Independent visualization of images, tracings, or specimens: yes    Risk of Complications, Morbidity, and/or Mortality  General comments: 3:00 PM  CT shows bilateral pulmonary emboli and bilateral pleural effusions. Placed on heparin drip. Discussed with pulmonologist, Dr. Angel Galloway. Currently on amiodarone drip with heart rate around 130.   Discussed again with cardiologist. Pt and family updated.     Patient Progress  Patient progress: improved    ED Course       Procedures

## 2017-04-04 NOTE — CONSULTS
[place] Cardiology Consult     Attending Cardiologist:Dr. Chang Zuniga    Primary Cardiologist:none    Primary Care Physician:Dr. Blanco    Subjective: Pankaj Amaya is a 80 y.o. debiliated female who presented to ER today with one week history of SOB, hx of HTN, DM,  no CP. She was noted to be in atrial fibrillation with RVR. She was given IV cardizem with resulting hypotension. D dimer was elevated and CT chest revealed pulmonary embolism. She has no prior cardiac history. She has been initiated on IV amiodarone for rate control of atrial fibrillation. Past Medical History:   Diagnosis Date    Asthma 7/1/2015    Heartburn 7/1/2015    Hyperlipidemia 7/1/2015    Hypertension 7/1/2015    Type 2 diabetes mellitus (HCC)     Urge incontinence 7/1/2015      Past Surgical History:   Procedure Laterality Date    HX CATARACT REMOVAL  2011      Current Facility-Administered Medications   Medication Dose Route Frequency    sodium chloride (NS) flush 5-10 mL  5-10 mL IntraVENous Q8H    sodium chloride (NS) flush 5-10 mL  5-10 mL IntraVENous PRN    amiodarone (CORDARONE) 50 mg/mL injection        heparin 25,000 units in dextrose 500 mL infusion  18-36 Units/kg/hr IntraVENous TITRATE     Current Outpatient Prescriptions   Medication Sig    lisinopril (PRINIVIL, ZESTRIL) 20 mg tablet Take 1 Tab by mouth daily. Indications: Hypertension    CERTAVITE SENIOR-ANTIOXIDANT 0.4-300-250 mg-mcg-mcg tab Take 1 Tab by mouth daily.  CERTAVITE SENIOR-ANTIOXIDANT 0.4-300-250 mg-mcg-mcg tab     hydrochlorothiazide (HYDRODIURIL) 25 mg tablet Take 1 Tab by mouth daily.  atorvastatin (LIPITOR) 40 mg tablet Take 1 Tab by mouth nightly.  niacin ER (NIASPAN) 500 mg tablet Take 1 Tab by mouth nightly.  pantoprazole (PROTONIX) 20 mg tablet Take 1 Tab by mouth daily. Indications: HEARTBURN    amLODIPine (NORVASC) 2.5 mg tablet Take 1 Tab by mouth daily.     metFORMIN (GLUCOPHAGE) 500 mg tablet Take 1 Tab by mouth two (2) times a day.  loratadine (CLARITIN) 10 mg tablet Take 1 Tab by mouth daily.  fluticasone-salmeterol (ADVAIR DISKUS) 250-50 mcg/dose diskus inhaler Take 1 Puff by inhalation two (2) times a day.  acetaminophen (TYLENOL) 650 mg CR tablet Take 1 Tab by mouth every eight (8) hours. 2 TABS    ENTERIC COATED ASPIRIN PO Take 81 mg by mouth daily.  CALCIUM PHOSPHATE/VITAMIN D2 (CALCIUM-VITAMIN D2 PO) Take  by mouth three (3) times daily. No Known Allergies   Social History   Substance Use Topics    Smoking status: Never Smoker    Smokeless tobacco: Never Used    Alcohol use No      Family History   Problem Relation Age of Onset    Diabetes Father     Diabetes Other     Cancer Other     Breast Cancer Child      2 daughters had breast cancer    Breast Cancer Sister         Review of Systems  Gen: Denies fever, chills, +++malaise or fatigue. Appetite good. HEENT: Denies frequent headaches, dizzyness, visual disturbances, Neck pain or swallowing difficulty  Lungs: Denies shortness of breath, hx of COPD, breathing problems  Cardiovascular:as above   GI: Denies hememesis, dark tarry stools, No prior Hx of GI bleed, Denies constipation  : Denies dysuria, no complaints of frequency, nocturia  Heme: No prior bleeding disorders, no prior Cancer  Neuro: Denies prior CVA, TIA. Endocrine: no diabetes, thyroid disorders  Psychiatric: Denies anxiety, or other psychiatric illnesses. Objective:     Visit Vitals    BP 96/62 (BP 1 Location: Right arm, BP Patient Position: At rest)    Pulse (!) 131    Temp 98 °F (36.7 °C)    Resp 20    Ht 5' 1\" (1.549 m)    Wt 63.5 kg (140 lb)    SpO2 98%    BMI 26.45 kg/m2     General:chronically ill appearing. Head: Normocephalic, without obvious abnormality, atraumatic. Eyes: Conjunctivae/corneas clear. PERRL, EOMs intact  Nose:Nares normal. Septum midline. Mucosa normal. No drainage or sinus tenderness.    Throat: Lips, mucosa, and tongue normal. Teeth and gums normal.   Neck: Supple, symmetrical, trachea midline,  no carotid bruit and no JVD. Lungs:diminished in bases  Chest wall: No tenderness or deformity. Heart: irregular irregular, tachycardic   Abdomen:Soft, non-tender. Bowel sounds normal. No masses, No organomegaly. Extremities: Extremities normal, atraumatic, no cyanosis or edema. Pulses: 2+ and symmetric all extremities. Skin: Skin color, texture, turgor normal. No rashes or lesions  Lymph nodes: Cervical, supraclavicular, and axillary nodes normal  Neurologic:No focal deficits identified                 ECG: atrial fibrillation with RVR     Data Review:     Recent Results (from the past 24 hour(s))   EKG, 12 LEAD, SUBSEQUENT    Collection Time: 04/04/17 12:09 PM   Result Value Ref Range    Ventricular Rate 158 BPM    Atrial Rate 158 BPM    P-R Interval 128 ms    QRS Duration 80 ms    Q-T Interval 258 ms    QTC Calculation (Bezet) 418 ms    Calculated P Axis -131 degrees    Calculated R Axis 99 degrees    Calculated T Axis -101 degrees    Diagnosis       !! AGE AND GENDER SPECIFIC ECG ANALYSIS !!  Unusual P axis, possible ectopic atrial tachycardia  Rightward axis  RSR' or QR pattern in V1 suggests right ventricular conduction delay  Anterior infarct , age undetermined  Abnormal ECG  No previous ECGs available     CBC WITH AUTOMATED DIFF    Collection Time: 04/04/17 12:14 PM   Result Value Ref Range    WBC 5.8 4.3 - 11.1 K/uL    RBC 3.34 (L) 4.05 - 5.25 M/uL    HGB 10.8 (L) 11.7 - 15.4 g/dL    HCT 34.2 (L) 35.8 - 46.3 %    .4 (H) 79.6 - 97.8 FL    MCH 32.3 26.1 - 32.9 PG    MCHC 31.6 31.4 - 35.0 g/dL    RDW 14.3 11.9 - 14.6 %    PLATELET 334 316 - 289 K/uL    MPV 9.5 (L) 10.8 - 14.1 FL    DF AUTOMATED      NEUTROPHILS 75 43 - 78 %    LYMPHOCYTES 18 13 - 44 %    MONOCYTES 6 4.0 - 12.0 %    EOSINOPHILS 1 0.5 - 7.8 %    BASOPHILS 0 0.0 - 2.0 %    IMMATURE GRANULOCYTES 0.0 0.0 - 5.0 %    ABS. NEUTROPHILS 4.3 1.7 - 8.2 K/UL    ABS. LYMPHOCYTES 1.1 0.5 - 4.6 K/UL    ABS. MONOCYTES 0.3 0.1 - 1.3 K/UL    ABS. EOSINOPHILS 0.0 0.0 - 0.8 K/UL    ABS. BASOPHILS 0.0 0.0 - 0.2 K/UL    ABS. IMM. GRANS. 0.0 0.0 - 0.5 K/UL   METABOLIC PANEL, COMPREHENSIVE    Collection Time: 04/04/17 12:14 PM   Result Value Ref Range    Sodium 144 136 - 145 mmol/L    Potassium 4.1 3.5 - 5.1 mmol/L    Chloride 108 (H) 98 - 107 mmol/L    CO2 30 21 - 32 mmol/L    Anion gap 6 (L) 7 - 16 mmol/L    Glucose 133 (H) 65 - 100 mg/dL    BUN 21 8 - 23 MG/DL    Creatinine 1.21 (H) 0.6 - 1.0 MG/DL    GFR est AA 54 (L) >60 ml/min/1.73m2    GFR est non-AA 44 (L) >60 ml/min/1.73m2    Calcium 8.3 8.3 - 10.4 MG/DL    Bilirubin, total 0.6 0.2 - 1.1 MG/DL    ALT (SGPT) 37 12 - 65 U/L    AST (SGOT) 41 (H) 15 - 37 U/L    Alk. phosphatase 74 50 - 136 U/L    Protein, total 7.2 6.3 - 8.2 g/dL    Albumin 3.8 3.2 - 4.6 g/dL    Globulin 3.4 2.3 - 3.5 g/dL    A-G Ratio 1.1 (L) 1.2 - 3.5     MAGNESIUM    Collection Time: 04/04/17 12:14 PM   Result Value Ref Range    Magnesium 1.6 (L) 1.8 - 2.4 mg/dL   PHOSPHORUS    Collection Time: 04/04/17 12:14 PM   Result Value Ref Range    Phosphorus 3.1 2.3 - 3.7 MG/DL   BNP    Collection Time: 04/04/17 12:14 PM   Result Value Ref Range     pg/mL   D DIMER    Collection Time: 04/04/17 12:14 PM   Result Value Ref Range    D DIMER 6.32 (HH) <0.55 ug/ml(FEU)   POC TROPONIN-I    Collection Time: 04/04/17 12:17 PM   Result Value Ref Range    Troponin-I (POC) 0.04 0.0 - 0.08 ng/ml   EKG, 12 LEAD, INITIAL    Collection Time: 04/04/17 12:30 PM   Result Value Ref Range    Ventricular Rate 158 BPM    Atrial Rate 158 BPM    P-R Interval 132 ms    QRS Duration 80 ms    Q-T Interval 258 ms    QTC Calculation (Bezet) 418 ms    Calculated P Axis 143 degrees    Calculated R Axis 100 degrees    Calculated T Axis -164 degrees    Diagnosis       !!!  Suspect arm lead reversal, interpretation assumes no reversal  !! AGE AND GENDER SPECIFIC ECG ANALYSIS !!  Unusual P axis, possible ectopic atrial tachycardia  Rightward axis  Nonspecific ST and T wave abnormality  Abnormal ECG  When compared with ECG of 04-APR-2017 12:09,  No significant change was found           Assessment / Plan     Principal Problem:    PE (pulmonary thromboembolism) ([address] (4/4/2017)--per pulmonary    Active Problems:    Asthma (7/1/2015)      Atrial fibrillation (HCC) (4/4/2017)--rate control with IV amiodarone. Not ideal long term oral anticoagulation candidate. Review Echo.        Pleural effusion (4/4/2017)      Debility (4/4/2017)      Hypoxemia (4/4/2017)              Britta Oppenheim, NP

## 2017-04-04 NOTE — PROGRESS NOTES
Bedside report received from [facility]. Pt resting in bed. Family at bedside. WIll monitor.

## 2017-04-04 NOTE — H&P
HISTORY AND PHYSICAL      [facility]    4/4/2017    Date of Admission:  4/4/2017    The patient's chart is reviewed and the patient is discussed with the staff. Subjective:     Patient is a 80 y.o.  female presents with SOB and R shoulder pain. She went to see her PCP yesterday and she was told she had something wrong with her heart and she was place don [facility]. She has LE edema. She denies chest pain. She presented with A fib qith RVR and also PE. I was asked to admit her. Review of Systems  A comprehensive review of systems was negative except for that written in the HPI. Patient Active Problem List   Diagnosis Code    Hypertension I10    Hyperlipidemia E78.5    Asthma J45.909    Heartburn R12    Urge incontinence N39.41    PE (pulmonary thromboembolism) (HCC) I26.99    Atrial fibrillation (HCC) I48.91    Pleural effusion J90    Debility R53.81       Prior to Admission Medications   Prescriptions Last Dose Informant Patient Reported? Taking? CALCIUM PHOSPHATE/VITAMIN D2 (CALCIUM-VITAMIN D2 PO)   Yes No   Sig: Take  by mouth three (3) times daily. CERTAVITE SENIOR-ANTIOXIDANT 0.4-300-250 mg-mcg-mcg tab   Yes No   CERTAVITE SENIOR-ANTIOXIDANT 0.4-300-250 mg-mcg-mcg tab   No No   Sig: Take 1 Tab by mouth daily. ENTERIC COATED ASPIRIN PO   Yes No   Sig: Take 81 mg by mouth daily. acetaminophen (TYLENOL) 650 mg CR tablet   No No   Sig: Take 1 Tab by mouth every eight (8) hours. 2 TABS   amLODIPine (NORVASC) 2.5 mg tablet   No No   Sig: Take 1 Tab by mouth daily. atorvastatin (LIPITOR) 40 mg tablet   No No   Sig: Take 1 Tab by mouth nightly. fluticasone-salmeterol (ADVAIR DISKUS) 250-50 mcg/dose diskus inhaler   No No   Sig: Take 1 Puff by inhalation two (2) times a day. hydrochlorothiazide (HYDRODIURIL) 25 mg tablet   No No   Sig: Take 1 Tab by mouth daily. lisinopril (PRINIVIL, ZESTRIL) 20 mg tablet   No No   Sig: Take 1 Tab by mouth daily. Indications: Hypertension   loratadine (CLARITIN) 10 mg tablet   No No   Sig: Take 1 Tab by mouth daily. metFORMIN (GLUCOPHAGE) 500 mg tablet   No No   Sig: Take 1 Tab by mouth two (2) times a day. niacin ER (NIASPAN) 500 mg tablet   No No   Sig: Take 1 Tab by mouth nightly. pantoprazole (PROTONIX) 20 mg tablet   No No   Sig: Take 1 Tab by mouth daily. Indications: HEARTBURN      Facility-Administered Medications: None       Past Medical History:   Diagnosis Date    Asthma 7/1/2015    Heartburn 7/1/2015    Hyperlipidemia 7/1/2015    Hypertension 7/1/2015    Type 2 diabetes mellitus (HCC)     Urge incontinence 7/1/2015     Past Surgical History:   Procedure Laterality Date    HX CATARACT REMOVAL  2011     Social History     Social History    Marital status: SINGLE     Spouse name: N/A    Number of children: N/A    Years of education: N/A     Occupational History    Not on file. Social History Main Topics    Smoking status: Never Smoker    Smokeless tobacco: Never Used    Alcohol use No    Drug use: No    Sexual activity: Not on file     Other Topics Concern    Not on file     Social History Narrative     Family History   Problem Relation Age of Onset    Diabetes Father     Diabetes Other     Cancer Other     Breast Cancer Child      2 daughters had breast cancer    Breast Cancer Sister      No Known Allergies    Current Facility-Administered Medications   Medication Dose Route Frequency    sodium chloride (NS) flush 5-10 mL  5-10 mL IntraVENous Q8H    sodium chloride (NS) flush 5-10 mL  5-10 mL IntraVENous PRN    amiodarone (CORDARONE) 50 mg/mL injection        heparin (porcine) injection 4,000 Units  4,000 Units IntraVENous NOW    heparin 25,000 units in dextrose 500 mL infusion  18-36 Units/kg/hr IntraVENous TITRATE     Current Outpatient Prescriptions   Medication Sig    lisinopril (PRINIVIL, ZESTRIL) 20 mg tablet Take 1 Tab by mouth daily.  Indications: Hypertension    CERTAVITE SENIOR-ANTIOXIDANT 0.4-300-250 mg-mcg-mcg tab Take 1 Tab by mouth daily.  CERTAVITE SENIOR-ANTIOXIDANT 0.4-300-250 mg-mcg-mcg tab     hydrochlorothiazide (HYDRODIURIL) 25 mg tablet Take 1 Tab by mouth daily.  atorvastatin (LIPITOR) 40 mg tablet Take 1 Tab by mouth nightly.  niacin ER (NIASPAN) 500 mg tablet Take 1 Tab by mouth nightly.  pantoprazole (PROTONIX) 20 mg tablet Take 1 Tab by mouth daily. Indications: HEARTBURN    amLODIPine (NORVASC) 2.5 mg tablet Take 1 Tab by mouth daily.  metFORMIN (GLUCOPHAGE) 500 mg tablet Take 1 Tab by mouth two (2) times a day.  loratadine (CLARITIN) 10 mg tablet Take 1 Tab by mouth daily.  fluticasone-salmeterol (ADVAIR DISKUS) 250-50 mcg/dose diskus inhaler Take 1 Puff by inhalation two (2) times a day.  acetaminophen (TYLENOL) 650 mg CR tablet Take 1 Tab by mouth every eight (8) hours. 2 TABS    ENTERIC COATED ASPIRIN PO Take 81 mg by mouth daily.  CALCIUM PHOSPHATE/VITAMIN D2 (CALCIUM-VITAMIN D2 PO) Take  by mouth three (3) times daily. Objective:     Vitals:    04/04/17 1327 04/04/17 1411 04/04/17 1420 04/04/17 1444   BP: 100/67 94/64 93/66 93/62   Pulse: (!) 141 (!) 131 (!) 130 (!) 130   Resp: 25      Temp:       SpO2: 98%  93%    Weight:       Height:           PHYSICAL EXAM     Constitutional:  the patient is well developed and in no acute distress  EENMT:  Sclera clear, pupils equal, oral mucosa moist  Respiratory: diminished on bases   Cardiovascular:  RRR without M,G,R  Gastrointestinal: soft and non-tender; with positive bowel sounds. Musculoskeletal: warm without cyanosis. There is plus 2 lower leg edema.   Skin:  no jaundice or rashes, no wounds   Neurologic: no gross neuro deficits     Psychiatric:  alert and oriented x 3    CHEST CT:       Recent Labs      04/04/17   1214   WBC  5.8   HGB  10.8*   HCT  34.2*   PLT  169     Recent Labs      04/04/17   1214   NA  144   K  4.1   CL  108*   GLU  133*   CO2  30   BUN  21 CREA  1.21*   MG  1.6*   PHOS  3.1   CA  8.3   ALB  3.8   TBILI  0.6   ALT  37   SGOT  41*       Assessment:  (Medical Decision Making)     Hospital Problems  Date Reviewed: 1/16/2017          Codes Class Noted POA    * (Principal)PE (pulmonary thromboembolism) ([facility]   Heparin gtt ICD-10-CM: I26.99  ICD-9-CM: 415.19  4/4/2017 Yes        Atrial fibrillation ([facility]  on amiodarone gtt,  ICD-10-CM: I48.91  ICD-9-CM: 427.31  4/4/2017 Yes        Pleural effusion lasix   ICD-10-CM: J90  ICD-9-CM: 511.9  4/4/2017 Yes        Debility ICD-10-CM: R53.81  ICD-9-CM: 799.3  4/4/2017 Yes        Asthma ICD-10-CM: J45.909  ICD-9-CM: 493.90  7/1/2015 Yes              Plan:  (Medical Decision Making)     --Will admit for further medical management  --Supplemental O2   --Respiratory nebulizer treatments  - iv heparin  -cardiology already consulted- already on Amiodarone gtt  - palliative care consult     More than 50% of the time documented was spent in face-to-face contact with the patient and in the care of the patient on the floor/unit where the patient is located.     Hunter Arreaga MD

## 2017-04-04 NOTE — IP AVS SNAPSHOT
[facility] 
 
 
 145 [facility] 322 [facility] 
[phone] Patient: Demetria Thompson MRN: [id number] You are allergic to the following No active allergies Immunizations Administered for This Admission Name Date  
 TB Skin Test (PPD) Intradermal 2017 Recent Documentation Height Weight Breastfeeding? BMI OB Status Smoking Status 1.549 m 71.1 kg No 29.63 kg/m2 Postmenopausal Never Smoker Emergency Contacts Name Discharge Info Relation Home Work Mobile Carla Centeno  Child [2] [phone] About your hospitalization You were admitted on:  2017 You last received care in the:  [facility] 3 TELEMETRY You were discharged on:  2017 Unit phone number:  [phone] Why you were hospitalized Your primary diagnosis was:  Pe (Pulmonary Thromboembolism) (Hcc) Your diagnoses also included:  Atrial Fibrillation (Hcc), Pleural Effusion, Debility, Asthma, Hypoxemia, Hypomagnesemia, Acute Renal Failure (Arf) (Hcc), Do Not Resuscitate, Abdominal Pain Providers Seen During Your Hospitalizations Provider Role Specialty Primary office phone Cami Simon MD Attending Provider Emergency Medicine [phone] Roman Osgood, MD Attending Provider Pulmonary Disease [phone] Your Primary Care Physician (PCP) Primary Care Physician Office Phone Office Fax Marsha Buck [phone] Follow-up Information Follow up With Details Comments Contact Info Estiven Jerome MD  after discharge from [address] [id number] 
[phone] Tez Galindo MD   at [address] [place] [address] office  [facility] [address]  [address] [facility], [place] [address] 
[phone] [facility]  May 31st at 10:40am  [facility] [address] Sarah Han [place] [phone] Your Appointments Friday April 28, 2017  3:30 PM EDT TRANSITIONAL CARE with Adwoa Julian MD  
[facility] ([address]) [address] 
[address] Nancy Hurt 81  
[phone] Current Discharge Medication List  
  
START taking these medications Dose & Instructions Dispensing Information Comments Morning Noon Evening Bedtime  
 amiodarone 200 mg tablet Commonly known as:  CORDARONE Your last dose was: Your next dose is:    
   
   
 Dose:  200 mg Take 1 Tab by mouth every twelve (12) hours. Quantity:  60 Tab Refills:  11  
     
   
   
   
  
 apixaban 5 mg tablet Commonly known as:  Carol Ee Your last dose was: Your next dose is:    
   
   
 Dose:  5 mg Take 1 Tab by mouth every twelve (12) hours. Quantity:  60 Tab Refills:  11  
     
   
   
   
  
 docusate sodium 100 mg capsule Commonly known as:  Roman Gibbons Your last dose was: Your next dose is:    
   
   
 Dose:  100 mg Take 1 Cap by mouth two (2) times a day. Quantity:  60 Cap Refills:  11  
     
   
   
   
  
 levalbuterol 0.63 mg/3 mL Nebu Commonly known as:  Rosmery Mora Your last dose was: Your next dose is:    
   
   
 Dose:  0.63 mg  
3 mL by Nebulization route every six (6) hours as needed. Quantity:  [address] Refills:  11  
     
   
   
   
  
 metoprolol tartrate 25 mg tablet Commonly known as:  LOPRESSOR Your last dose was: Your next dose is:    
   
   
 Dose:  25 mg Take 1 Tab by mouth every six (6) hours. Quantity:  120 Tab Refills:  11  
     
   
   
   
  
 sodium chloride 0.65 % nasal spray Commonly known as:  OCEAN Your last dose was: Your next dose is:    
   
   
 Dose:  1 Spray 1 [place] by Both Nostrils route as needed for Congestion (Keep at bedside for PRN use.). Quantity:  15 mL Refills:  11  
     
   
   
 CONTINUE these medications which have NOT CHANGED Dose & Instructions Dispensing Information Comments Morning Noon Evening Bedtime  
 acetaminophen 650 mg CR tablet Commonly known as:  TYLENOL Your last dose was: Your next dose is:    
   
   
 Dose:  650 mg Take 1 Tab by mouth every eight (8) hours. 2 TABS Quantity:  90 Tab Refills:  11  
     
   
   
   
  
 atorvastatin 40 mg tablet Commonly known as:  LIPITOR Your last dose was: Your next dose is:    
   
   
 Dose:  40 mg Take 1 Tab by mouth nightly. Quantity:  90 Tab Refills:  3 CALCIUM-VITAMIN D2 PO Your last dose was: Your next dose is: Take  by mouth three (3) times daily. Refills:  0  
     
   
   
   
  
 * CERTAVITE SENIOR-ANTIOXIDANT 0.4-300-250 mg-mcg-mcg Tab Generic drug:  multivit-min-FA-lycopen-lutein Your last dose was: Your next dose is: Take  by mouth daily. Refills:  0  
     
   
   
   
  
 * CERTAVITE SENIOR-ANTIOXIDANT 0.4-300-250 mg-mcg-mcg Tab Generic drug:  multivit-min-FA-lycopen-lutein Your last dose was: Your next dose is: Take 1 Tab by mouth daily. Quantity:  90 Tab Refills:  3 ENTERIC COATED ASPIRIN PO Your last dose was: Your next dose is:    
   
   
 Dose:  81 mg Take 81 mg by mouth daily. Refills:  0  
     
   
   
   
  
 fluticasone-salmeterol 250-50 mcg/dose diskus inhaler Commonly known as:  ADVAIR DISKUS Your last dose was: Your next dose is:    
   
   
 Dose:  1 Puff Take 1 Puff by inhalation two (2) times a day. Quantity:  1 Inhaler Refills:  11  
     
   
   
   
  
 loratadine 10 mg tablet Commonly known as:  Shravan Rosales Your last dose was: Your next dose is:    
   
   
 Dose:  10 mg Take 1 Tab by mouth daily. Quantity:  90 Tab Refills:  3 niacin  mg tablet Commonly known as:  Niaspan Your last dose was: Your next dose is:    
   
   
 Dose:  500 mg Take 1 Tab by mouth nightly. Quantity:  90 Tab Refills:  3  
     
   
   
   
  
 pantoprazole 20 mg tablet Commonly known as:  PROTONIX Your last dose was: Your next dose is:    
   
   
 Dose:  20 mg Take 1 Tab by mouth daily. Indications: HEARTBURN Quantity:  90 Tab Refills:  2  
     
   
   
   
  
 * Notice: This list has 2 medication(s) that are the same as other medications prescribed for you. Read the directions carefully, and ask your doctor or other care provider to review them with you. STOP taking these medications   
 amLODIPine 2.5 mg tablet Commonly known as:  NORVASC  
   
  
 hydroCHLOROthiazide 25 mg tablet Commonly known as:  HYDRODIURIL  
   
  
 lisinopril 20 mg tablet Commonly known as:  PRINIVIL, ZESTRIL  
   
  
 metFORMIN 500 mg tablet Commonly known as:  GLUCOPHAGE Where to Get Your Medications These medications were sent to [address] [place] [address] Phone:  [phone]  
  amiodarone 200 mg tablet  
 apixaban 5 mg tablet  
 docusate sodium 100 mg capsule  
 levalbuterol 0.63 mg/3 mL Nebu  
 metoprolol tartrate 25 mg tablet  
 sodium chloride 0.65 % nasal spray Discharge Instructions Atrial Fibrillation: Care Instructions Your Care Instructions Atrial fibrillation is an irregular and often fast heartbeat. Treating this condition is important for several reasons. It can cause blood clots, which can travel from your heart to your brain and cause a stroke. If you have a fast heartbeat, you may feel lightheaded, dizzy, and weak. An irregular heartbeat can also increase your risk for heart failure.  
Atrial fibrillation is often the result of another heart condition, such as high blood pressure or coronary artery disease. Making changes to improve your heart condition will help you stay healthy and active. Follow-up care is a key part of your treatment and safety. Be sure to make and go to all appointments, and call your doctor if you are having problems. It's also a good idea to know your test results and keep a list of the medicines you take. How can you care for yourself at home? Medicines · Take your medicines exactly as prescribed. Call your doctor if you think you are having a problem with your medicine. You will get more details on the specific medicines your doctor prescribes. · If your doctor has given you a blood thinner to prevent a stroke, be sure you get instructions about how to take your medicine safely. Blood thinners can cause serious bleeding problems. · Do not take any vitamins, over-the-counter drugs, or herbal products without talking to your doctor first. 
Lifestyle changes · Do not smoke. Smoking can increase your chance of a stroke and heart attack. If you need help quitting, talk to your doctor about stop-smoking programs and medicines. These can increase your chances of quitting for good. · Eat a heart-healthy diet. · Stay at a healthy weight. Lose weight if you need to. · Limit alcohol to 2 drinks a day for men and 1 drink a day for women. Too much alcohol can cause health problems. · Avoid colds and flu. Get a pneumococcal vaccine shot. If you have had one before, ask your doctor whether you need another dose. Get a flu shot every year. If you must be around people with colds or flu, wash your hands often. Activity · If your doctor recommends it, get more exercise. Walking is a good choice. Bit by bit, increase the amount you walk every day. Try for at least 30 minutes on most days of the week. You also may want to swim, bike, or do other activities.  Your doctor may suggest that you join a cardiac rehabilitation program so that you can have help increasing your physical activity safely. · Start light exercise if your doctor says it is okay. Even a small amount will help you get stronger, have more energy, and manage stress. Walking is an easy way to get exercise. Start out by walking a little more than you did in the hospital. Gradually increase the amount you walk. · When you exercise, watch for signs that your heart is working too hard. You are pushing too hard if you cannot talk while you are exercising. If you become short of breath or dizzy or have chest pain, sit down and rest immediately. · Check your pulse regularly. Place two fingers on the artery at the palm side of your wrist, in line with your thumb. If your heartbeat seems uneven or fast, talk to your doctor. When should you call for help? Call 911 anytime you think you may need emergency care. For example, call if: 
· You have symptoms of a heart attack. These may include: ¨ Chest pain or pressure, or a strange feeling in the chest. 
¨ Sweating. ¨ Shortness of breath. ¨ Nausea or vomiting. ¨ Pain, pressure, or a strange feeling in the back, neck, jaw, or upper belly or in one or both shoulders or arms. ¨ Lightheadedness or sudden weakness. ¨ A fast or irregular heartbeat. After you call 911, the  may tell you to chew 1 adult-strength or 2 to 4 low-dose aspirin. Wait for an ambulance. Do not try to drive yourself. · You have symptoms of a stroke. These may include: 
¨ Sudden numbness, tingling, weakness, or loss of movement in your face, arm, or leg, especially on only one side of your body. ¨ Sudden vision changes. ¨ Sudden trouble speaking. ¨ Sudden confusion or trouble understanding simple statements. ¨ Sudden problems with walking or balance. ¨ A sudden, severe headache that is different from past headaches. · You passed out (lost consciousness). Call your doctor now or seek immediate medical care if: · You have new or increased shortness of breath. · You feel dizzy or lightheaded, or you feel like you may faint. · Your heart rate becomes irregular. · You can feel your heart flutter in your chest or skip heartbeats. Tell your doctor if these symptoms are new or worse. Watch closely for changes in your health, and be sure to contact your doctor if you have any problems. Where can you learn more? Go to http://michael-mauro.info/. Enter U020 in the search box to learn more about \"Atrial Fibrillation: Care Instructions. \" Current as of: January 27, 2016 Content Version: 11.2 © 8258-7688 [employer]ribu. Care instructions adapted under license by [employer] (which disclaims liability or warranty for this information). If you have questions about a medical condition or this instruction, always ask your healthcare professional. [address] [address] any warranty or liability for your use of this information. Pulmonary Embolism: Care Instructions Your Care Instructions Pulmonary embolism is the sudden blockage of an artery in the lung. Blood clots in the deep veins of the leg or pelvis (deep vein thrombosis, or DVT) are the most common cause. These blood clots can travel to the lungs. Pulmonary embolism can be very serious. Because you have had one pulmonary embolism, you are at greater risk for having another one. But you can take steps to prevent another pulmonary embolism by following your doctor's instructions. You will probably take a prescription blood-thinning medicine to prevent blood clots. A blood thinner can stop a blood clot from growing larger and prevent new clots from forming. Follow-up care is a key part of your treatment and safety. Be sure to make and go to all appointments, and call your doctor if you are having problems. It's also a good idea to know your test results and keep a list of the medicines you take. How can you care for yourself at home? · Take your medicines exactly as prescribed. Call your doctor if you think you are having a problem with your medicine. You will get more details on the specific medicines your doctor prescribes. · If you are taking a blood thinner, be sure you get instructions about how to take your medicine safely. Blood thinners can cause serious bleeding problems. Preventing future pulmonary embolisms · Exercise. Keep blood moving in your legs to keep clots from forming. If you are traveling by car, stop every hour or so. Get out and walk around for a few minutes. If you are traveling by bus, train, or plane, get out of your seat and walk up and down the aisles every hour or so. You also can do leg exercises while you are seated. Pump your feet up and down by pulling your toes up toward your knees then pointing them down. · Get up out of bed as soon as possible after an illness or surgery. · Do not smoke. If you need help quitting, talk to your doctor about stop-smoking programs and medicines. These can increase your chances of quitting for good. · Check with your doctor before taking hormone or birth control pills. These may increase your risk of blot clots. · Ask your doctor about wearing compression stockings to help prevent blood clots in your legs. You can buy these with a prescription at medical supply stores and some drugstores. When should you call for help? Call 911 anytime you think you may need emergency care. For example, call if: 
· You have shortness of breath. · You have chest pain. · You passed out (lost consciousness). · You cough up blood. Call your doctor now or seek immediate medical care if: 
· You have new or worsening pain or swelling in your leg. Watch closely for changes in your health, and be sure to contact your doctor if: 
· You do not get better as expected. Where can you learn more? Go to http://michael-mauro.info/. Enter F770 in the search box to learn more about \"Pulmonary Embolism: Care Instructions. \" Current as of: October 13, 2016 Content Version: 11.2 © 7688-9664 [employer]Divvy, Duetto. Care instructions adapted under license by [employer] (which disclaims liability or warranty for this information). If you have questions about a medical condition or this instruction, always ask your healthcare professional. [address] any warranty or liability for your use of this information. Discharge Orders None ACO Transitions of Care Introducing [employer] [address] Peri Zuluaga offers a voluntary care coordination program to provide high quality service and care to [facility] fee-for-service beneficiaries. Aaron Jones was designed to help you enhance your health and well-being through the following services: ? Transitions of Care  support for individuals who are transitioning from one care setting to another (example: Hospital to home). ? Chronic and Complex Care Coordination  support for individuals and caregivers of those with serious or chronic illnesses or with more than one chronic (ongoing) condition and those who take a number of different medications. If you meet specific medical criteria, a [address] [place] [address] Rd may call you directly to coordinate your care with your primary care physician and your other care providers. For questions about the [facility] programs, please, contact your physicians office. For general questions or additional information about Accountable Care Organizations: 
Please visit www.medicare.gov/acos. html or call 1-800-MEDICARE (2-374.782.3927) TTY users should call 9-776.701.2983. [facility]o Announcement We are excited to announce that we are making your provider's discharge notes available to you in barcoo.   You will see these notes when they are completed and signed by the physician that discharged you from your recent hospital stay. If you have any questions or concerns about any information you see in [facility], please call the Health Information Department where you were seen or reach out to your Primary Care Provider for more information about your plan of care. Introducing [facility] SERVICES! Marco Berrios introduces [facility] patient portal. Now you can access parts of your medical record, email your doctor's office, and request medication refills online. 1. In your internet browser, go to https://"[employer]. Lightspeed/"[employer] 2. Click on the First Time User? Click Here link in the Sign In box. You will see the New Member Sign Up page. 3. Enter your [facility] Access Code exactly as it appears below. You will not need to use this code after youve completed the sign-up process. If you do not sign up before the expiration date, you must request a new code. · [facility] Access Code: [email or web address] Expires: 7/5/2017  4:25 PM 
 
4. Enter the last four digits of your Social Security Number (xxxx) and Date of Birth (mm/dd/yyyy) as indicated and click Submit. You will be taken to the next sign-up page. 5. Create a [facility] ID. This will be your [facility] login ID and cannot be changed, so think of one that is secure and easy to remember. 6. Create a [facility] password. You can change your password at any time. 7. Enter your Password Reset Question and Answer. This can be used at a later time if you forget your password. 8. Enter your e-mail address. You will receive e-mail notification when new information is available in [address] 9. Click Sign Up. You can now view and download portions of your medical record. 10. Click the Download Summary menu link to download a portable copy of your medical information.  
 
If you have questions, please visit the Frequently Asked Questions section of the [facility]. Remember, MyChart is NOT to be used for urgent needs. For medical emergencies, dial 911. Now available from your iPhone and Android! General Information Please provide this summary of care documentation to your next provider. Patient Signature:  ____________________________________________________________ Date:  ____________________________________________________________  
  
Leocadia Nim Provider Signature:  ____________________________________________________________ Date:  ____________________________________________________________

## 2017-04-04 NOTE — PROGRESS NOTES
TRANSFER - IN REPORT:    Verbal report received from [facility] on [facility] being received from ER for routine progression of care. Report consisted of patients Situation, Background, Assessment and Recommendations(SBAR). Information from the following report(s) SBAR, ED Summary, [facility] and Cardiac Rhythm a flutter was reviewed. Opportunity for questions and clarification was provided. Assessment completed upon patients arrival to unit and care assumed. Patient received to room 308. Patient connected to monitor and assessment completed. Plan of care reviewed. Patient oriented to room and call light. Patient aware to use call light to communicate any chest pain or needs. Admission skin assessment completed with second RN and reveals the following: sacral area scabbed small area (pt unaware of what happened), sacrum red but blanchable, skin frail and thin, scattered small bruises.

## 2017-04-05 LAB
ANION GAP BLD CALC-SCNC: 10 MMOL/L (ref 7–16)
APTT PPP: >110 SEC (ref 23.5–31.7)
BASOPHILS # BLD AUTO: 0 K/UL (ref 0–0.2)
BASOPHILS # BLD: 0 % (ref 0–2)
BUN SERPL-MCNC: 22 MG/DL (ref 8–23)
CALCIUM SERPL-MCNC: 8 MG/DL (ref 8.3–10.4)
CHLORIDE SERPL-SCNC: 109 MMOL/L (ref 98–107)
CO2 SERPL-SCNC: 26 MMOL/L (ref 21–32)
CREAT SERPL-MCNC: 1.29 MG/DL (ref 0.6–1)
DIFFERENTIAL METHOD BLD: ABNORMAL
EOSINOPHIL # BLD: 0.1 K/UL (ref 0–0.8)
EOSINOPHIL NFR BLD: 2 % (ref 0.5–7.8)
ERYTHROCYTE [DISTWIDTH] IN BLOOD BY AUTOMATED COUNT: 14.6 % (ref 11.9–14.6)
ERYTHROCYTE [DISTWIDTH] IN BLOOD BY AUTOMATED COUNT: 14.8 % (ref 11.9–14.6)
GLUCOSE BLD STRIP.AUTO-MCNC: 101 MG/DL (ref 65–100)
GLUCOSE BLD STRIP.AUTO-MCNC: 105 MG/DL (ref 65–100)
GLUCOSE BLD STRIP.AUTO-MCNC: 151 MG/DL (ref 65–100)
GLUCOSE BLD STRIP.AUTO-MCNC: 157 MG/DL (ref 65–100)
GLUCOSE SERPL-MCNC: 98 MG/DL (ref 65–100)
HCT VFR BLD AUTO: 32.1 % (ref 35.8–46.3)
HCT VFR BLD AUTO: 34.1 % (ref 35.8–46.3)
HGB BLD-MCNC: 10.1 G/DL (ref 11.7–15.4)
HGB BLD-MCNC: 11.1 G/DL (ref 11.7–15.4)
IMM GRANULOCYTES # BLD: 0 K/UL (ref 0–0.5)
IMM GRANULOCYTES NFR BLD AUTO: 0 % (ref 0–5)
LYMPHOCYTES # BLD AUTO: 33 % (ref 13–44)
LYMPHOCYTES # BLD: 1.7 K/UL (ref 0.5–4.6)
MCH RBC QN AUTO: 32.6 PG (ref 26.1–32.9)
MCH RBC QN AUTO: 33.3 PG (ref 26.1–32.9)
MCHC RBC AUTO-ENTMCNC: 31.5 G/DL (ref 31.4–35)
MCHC RBC AUTO-ENTMCNC: 32.6 G/DL (ref 31.4–35)
MCV RBC AUTO: 102.4 FL (ref 79.6–97.8)
MCV RBC AUTO: 103.5 FL (ref 79.6–97.8)
MONOCYTES # BLD: 0.1 K/UL (ref 0.1–1.3)
MONOCYTES NFR BLD AUTO: 2 % (ref 4–12)
NEUTS SEG # BLD: 3.4 K/UL (ref 1.7–8.2)
NEUTS SEG NFR BLD AUTO: 63 % (ref 43–78)
PLATELET # BLD AUTO: 153 K/UL (ref 150–450)
PLATELET # BLD AUTO: 177 K/UL (ref 150–450)
PMV BLD AUTO: 10.3 FL (ref 10.8–14.1)
PMV BLD AUTO: 9.6 FL (ref 10.8–14.1)
POTASSIUM SERPL-SCNC: 3.7 MMOL/L (ref 3.5–5.1)
RBC # BLD AUTO: 3.1 M/UL (ref 4.05–5.25)
RBC # BLD AUTO: 3.33 M/UL (ref 4.05–5.25)
SODIUM SERPL-SCNC: 145 MMOL/L (ref 136–145)
WBC # BLD AUTO: 5.4 K/UL (ref 4.3–11.1)
WBC # BLD AUTO: 6.1 K/UL (ref 4.3–11.1)

## 2017-04-05 PROCEDURE — 74011000250 HC RX REV CODE- 250: Performed by: INTERNAL MEDICINE

## 2017-04-05 PROCEDURE — 85730 THROMBOPLASTIN TIME PARTIAL: CPT | Performed by: INTERNAL MEDICINE

## 2017-04-05 PROCEDURE — 85025 COMPLETE CBC W/AUTO DIFF WBC: CPT | Performed by: INTERNAL MEDICINE

## 2017-04-05 PROCEDURE — 65660000000 HC RM CCU STEPDOWN

## 2017-04-05 PROCEDURE — 85027 COMPLETE CBC AUTOMATED: CPT | Performed by: INTERNAL MEDICINE

## 2017-04-05 PROCEDURE — 74011250637 HC RX REV CODE- 250/637: Performed by: NURSE PRACTITIONER

## 2017-04-05 PROCEDURE — 74011250637 HC RX REV CODE- 250/637: Performed by: INTERNAL MEDICINE

## 2017-04-05 PROCEDURE — 36415 COLL VENOUS BLD VENIPUNCTURE: CPT | Performed by: INTERNAL MEDICINE

## 2017-04-05 PROCEDURE — 74011636637 HC RX REV CODE- 636/637: Performed by: INTERNAL MEDICINE

## 2017-04-05 PROCEDURE — 97162 PT EVAL MOD COMPLEX 30 MIN: CPT

## 2017-04-05 PROCEDURE — 77030021668 HC NEB PREFIL KT VYRM -A

## 2017-04-05 PROCEDURE — 82962 GLUCOSE BLOOD TEST: CPT

## 2017-04-05 PROCEDURE — 94640 AIRWAY INHALATION TREATMENT: CPT

## 2017-04-05 PROCEDURE — 99232 SBSQ HOSP IP/OBS MODERATE 35: CPT | Performed by: INTERNAL MEDICINE

## 2017-04-05 PROCEDURE — 94760 N-INVAS EAR/PLS OXIMETRY 1: CPT

## 2017-04-05 PROCEDURE — 80048 BASIC METABOLIC PNL TOTAL CA: CPT | Performed by: INTERNAL MEDICINE

## 2017-04-05 PROCEDURE — 74011250636 HC RX REV CODE- 250/636: Performed by: NURSE PRACTITIONER

## 2017-04-05 PROCEDURE — 77010033678 HC OXYGEN DAILY

## 2017-04-05 RX ADMIN — RIVAROXABAN 15 MG: 15 TABLET, FILM COATED ORAL at 22:06

## 2017-04-05 RX ADMIN — LISINOPRIL 20 MG: 20 TABLET ORAL at 08:30

## 2017-04-05 RX ADMIN — ATORVASTATIN CALCIUM 40 MG: 40 TABLET, FILM COATED ORAL at 22:06

## 2017-04-05 RX ADMIN — LEVALBUTEROL HYDROCHLORIDE 0.63 MG: 0.63 SOLUTION RESPIRATORY (INHALATION) at 21:09

## 2017-04-05 RX ADMIN — PANTOPRAZOLE SODIUM 40 MG: 40 TABLET, DELAYED RELEASE ORAL at 08:30

## 2017-04-05 RX ADMIN — LEVALBUTEROL HYDROCHLORIDE 0.63 MG: 0.63 SOLUTION RESPIRATORY (INHALATION) at 13:55

## 2017-04-05 RX ADMIN — HUMAN INSULIN 2 UNITS: 100 INJECTION, SOLUTION SUBCUTANEOUS at 12:32

## 2017-04-05 RX ADMIN — LEVALBUTEROL HYDROCHLORIDE 0.63 MG: 0.63 SOLUTION RESPIRATORY (INHALATION) at 07:44

## 2017-04-05 RX ADMIN — Medication 10 ML: at 22:07

## 2017-04-05 RX ADMIN — AMIODARONE HYDROCHLORIDE 0.5 MG/MIN: 50 INJECTION, SOLUTION INTRAVENOUS at 18:07

## 2017-04-05 RX ADMIN — HUMAN INSULIN 2 UNITS: 100 INJECTION, SOLUTION SUBCUTANEOUS at 22:06

## 2017-04-05 RX ADMIN — LORATADINE 10 MG: 10 TABLET ORAL at 08:30

## 2017-04-05 RX ADMIN — NIACIN 500 MG: 500 TABLET, FILM COATED, EXTENDED RELEASE ORAL at 22:05

## 2017-04-05 RX ADMIN — LEVALBUTEROL HYDROCHLORIDE 0.63 MG: 0.63 SOLUTION RESPIRATORY (INHALATION) at 02:00

## 2017-04-05 RX ADMIN — ACETAMINOPHEN 650 MG: 325 TABLET, FILM COATED ORAL at 10:51

## 2017-04-05 NOTE — PROGRESS NOTES
Sahara Armando  Admission Date: 4/4/2017             Daily Progress Note: 4/5/2017    The patient's chart is reviewed and the patient is discussed with the staff. 89yo admitted with Afib RVR, PE. Subjective:      In bed, sleeping but awakens to verbal stimuli  Very hard of hearing   Cough with white sputum     Current Facility-Administered Medications   Medication Dose Route Frequency    sodium chloride (NS) flush 5-10 mL  5-10 mL IntraVENous Q8H    sodium chloride (NS) flush 5-10 mL  5-10 mL IntraVENous PRN    heparin 25,000 units in dextrose 500 mL infusion  18-36 Units/kg/hr IntraVENous TITRATE    atorvastatin (LIPITOR) tablet 40 mg  40 mg Oral QHS    hydroCHLOROthiazide (HYDRODIURIL) tablet 25 mg  25 mg Oral DAILY    lisinopril (PRINIVIL, ZESTRIL) tablet 20 mg  20 mg Oral DAILY    loratadine (CLARITIN) tablet 10 mg  10 mg Oral DAILY    niacin ER (NIASPAN) tablet 500 mg  500 mg Oral QHS    pantoprazole (PROTONIX) tablet 40 mg  40 mg Oral ACB    sodium chloride (NS) flush 5-10 mL  5-10 mL IntraVENous Q8H    sodium chloride (NS) flush 5-10 mL  5-10 mL IntraVENous PRN    LORazepam (ATIVAN) injection 1 mg  1 mg IntraVENous Q6H PRN    levalbuterol (XOPENEX) nebulizer soln 0.63 mg/3 mL  0.63 mg Nebulization Q6H RT    insulin regular (NOVOLIN R, HUMULIN R) injection   SubCUTAneous AC&HS    amiodarone (CORDARONE) 450 mg in dextrose 5% 250 mL infusion  0.5 mg/min IntraVENous CONTINUOUS    acetaminophen (TYLENOL) tablet 650 mg  650 mg Oral Q6H PRN       Review of Systems  Constitutional: negative for fever, chills, sweats  Cardiovascular: negative for chest pain, palpitations, syncope, + edema  Gastrointestinal:  negative for dysphagia, reflux, vomiting, diarrhea, abdominal pain, or melena  Neurologic:  negative for focal weakness, numbness, headache    Objective:     Vitals:    04/05/17 0531 04/05/17 0536 04/05/17 0745 04/05/17 0837   BP: 98/74   96/67   Pulse: (!) 128 (!) 128   Resp: 18   24   Temp: 97 °F (36.1 °C)   97.5 °F (36.4 °C)   SpO2: 100%  93% 93%   Weight:  149 lb (67.6 kg)     Height:         Intake and Output:   04/03 1901 - 04/05 0700  In: 240 [P.O.:240]  Out: 200 [Urine:200]       Physical Exam:   Constitution:  the patient is elderly and in no acute distress, on 2L NC   EENMT:  Sclera clear, pupils equal, oral mucosa moist  Respiratory: diminished bases, no wheezing   Cardiovascular:  Irregular, tachy without M,G,R  Gastrointestinal: soft and non-tender; with positive bowel sounds. Musculoskeletal: warm without cyanosis. There is 1+ lower leg edema. Skin:  no jaundice or rashes, no open  wounds   Neurologic: no gross neuro deficits     Psychiatric:  alert and oriented x 3     CHEST XRAY 4/4/17:       CT Chest 4/4/17:      LAB  Recent Labs      04/05/17   0728  04/04/17   2133   GLUCPOC  105*  168*      Recent Labs      04/05/17   0336  04/04/17   1847  04/04/17   1214   WBC  5.4   --   5.8   HGB  10.1*   --   10.8*   HCT  32.1*   --   34.2*   PLT  153   --   169   INR   --   1.3*   --      Recent Labs      04/05/17   0336  04/04/17   1214   NA  145  144   K  3.7  4.1   CL  109*  108*   CO2  26  30   GLU  98  133*   BUN  22  21   CREA  1.29*  1.21*   MG   --   1.6*   CA  8.0*  8.3   PHOS   --   3.1   ALB   --   3.8   TBILI   --   0.6   ALT   --   37   SGOT   --   41*     No results for input(s): PH, PCO2, PO2, HCO3 in the last 72 hours. No results for input(s): LCAD, LAC in the last 72 hours.       Assessment:  (Medical Decision Making)     Hospital Problems  Date Reviewed: 4/5/2017          Codes Class Noted POA    * (Principal)PE (pulmonary thromboembolism) ([facility] ICD-10-CM: I26.99  ICD-9-CM: 415.19  4/4/2017 Yes    Continue heparin gtt     Atrial fibrillation ([facility] ICD-10-CM: I48.91  ICD-9-CM: 427.31  4/4/2017 Yes    Continue amiodarone gtt     Pleural effusion ICD-10-CM: J90  ICD-9-CM: 511.9  4/4/2017 Yes    Received lasix 40mg IV x 1 yesterday     Debility ICD-10-CM: R53.81  ICD-9-CM: 799.3  4/4/2017 Yes    Unchanged     Hypoxemia ICD-10-CM: R09.02  ICD-9-CM: 799.02  4/4/2017 Unknown    Remains on 2L NC     Asthma ICD-10-CM: J45.909  ICD-9-CM: 493.90  7/1/2015 Yes    No wheezing           Plan:  (Medical Decision Making)     --Continue Amiodarone gtt  --Continue Heparin gtt  --Xopenex, Claritin   --Wean O2 as tolerated   --Palliative Care consult pending   --May need additional lasix     More than 50% of the time documented was spent in face-to-face contact with the patient and in the care of the patient on the floor/unit where the patient is located. Tamara Pringle NP  I have spoken with and examined the patient. I agree with the above assessment and plan as documented. Patient is [place] but reports she is feeling more comfortable than yesterday. She continues to require supplemental O2 and [place] [facility] is assisting with a. Fib with RVR. Gen: pleasant, NAD  Lungs: decreased bilaterally  Heart: irregular, tachy  Ext: no edema    --Benefits likely outweigh risks of oral anticoagulant with PE and a. Fib, although maybe just for short-term treatment of the PE and not indefinitely. Will discuss this further with family.   --Continue xopenex  --f/u volume status tomorrow to determine need for lasix      Anna Camilo MD

## 2017-04-05 NOTE — CONSULTS
Palliative Care    Patient: Carlotta Calderon MRN: [id number]  SSN: [id number]    YOB: 1926  Age: 80 y.o. Sex: female       Date of Request: 4/4/2017  Date of Consult:  4/5/2017  Reason for Consult:  assist in chronic disease management and goals of care  Requesting Physician: Burt Vera     Assessment/Plan:     Principal Diagnosis:    Dyspnea  R06.00    Additional Diagnoses:   · Cough  R05  · Debility, Unspecified  R53.81  · Edema  R60.9  · Counseling, Encounter for Medical Advice  Z71.9  · Encounter for Palliative Care  Z51.5    Palliative Performance Scale (PPS):       Medical Decision Making:   Reviewed and summarized admission notes  Discussed case with appropriate providers. Reviewed laboratory and x-ray data. CBC, CMP, BNP, Chest CT    Discussed with patient at bedside. She is very pleasant, alert, oriented, answers questions appropriately. She lives alone, she has 4 daughters, one of whom lives close to her an visits her often. Prior to this episode, she was able to function fairly well in her home, up and around, etc.  Daughter helps her go to store, etc.   She denies burdensome symptoms prior to acute episode. Introduced role of Palliative Care in medical decision making, establishing goals, etc.  Will plan to followup with patient when daughter present. Will discuss findings with members of the interdisciplinary team.      Thank you for this referral.   The Palliative Care team is available from 8 am to 4:30 pm Monday-Friday. Medical management during other hours is per the primary attending service. .    Subjective:     History obtained from:  Patient and Chart    Chief Complaint: can't get breath  History of Present Illness:  79 y/o female admitted yesterday for increased shortness of breath and found to have pulmonary embolus, elevated BNP, AFRVR. Admitted for anticoagulation and rate control.              Advance Directive: Not received   Code Status:  Full Code Health Care Power of : Unknown    Past Medical History:   Diagnosis Date    Asthma 7/1/2015    Heartburn 7/1/2015    Hyperlipidemia 7/1/2015    Hypertension 7/1/2015    Type 2 diabetes mellitus ([address]     Urge incontinence 7/1/2015      Past Surgical History:   Procedure Laterality Date    HX CATARACT REMOVAL  2011     Family History   Problem Relation Age of Onset    Diabetes Father     Diabetes Other     Cancer Other     Breast Cancer Child      2 daughters had breast cancer    Breast Cancer Sister       Social History   Substance Use Topics    Smoking status: Never Smoker    Smokeless tobacco: Never Used    Alcohol use No     Prior to Admission medications    Medication Sig Start Date End Date Taking? Authorizing Provider   multivit-min-FA-lycopen-lutein (CERTAVITE SENIOR-ANTIOXIDANT) 0.4-300-250 mg-mcg-mcg tab Take  by mouth daily. Yes Historical Provider   lisinopril (PRINIVIL, ZESTRIL) 20 mg tablet Take 1 Tab by mouth daily. Indications: Hypertension 1/16/17  Yes Onelia Helton MD   CERTAVITE SENIOR-ANTIOXIDANT 8.2-931-710 mg-mcg-mcg tab Take 1 Tab by mouth daily. 12/2/16  Yes Onelia Helton MD   hydrochlorothiazide (HYDRODIURIL) 25 mg tablet Take 1 Tab by mouth daily. 7/11/16  Yes Onelia Helton MD   atorvastatin (LIPITOR) 40 mg tablet Take 1 Tab by mouth nightly. 7/11/16  Yes Onelia Helton MD   niacin ER (NIASPAN) 500 mg tablet Take 1 Tab by mouth nightly. 7/11/16  Yes Onelia Helton MD   pantoprazole (PROTONIX) 20 mg tablet Take 1 Tab by mouth daily. Indications: HEARTBURN 7/11/16  Yes Onelia Helton MD   amLODIPine (NORVASC) 2.5 mg tablet Take 1 Tab by mouth daily. 7/11/16  Yes Onelia Helton MD   metFORMIN (GLUCOPHAGE) 500 mg tablet Take 1 Tab by mouth two (2) times a day. 7/11/16  Yes Onelia Helton MD   loratadine (CLARITIN) 10 mg tablet Take 1 Tab by mouth daily.  7/11/16  Yes Onelia Helton MD   fluticasone-salmeterol (ADVAIR DISKUS) 250-50 mcg/dose diskus inhaler Take 1 Puff by inhalation two (2) times a day. 7/11/16  Yes Rik Marroquin MD   acetaminophen (TYLENOL) 650 mg CR tablet Take 1 Tab by mouth every eight (8) hours. 2 TABS 7/11/16  Yes Rik Marroquin MD   ENTERIC COATED ASPIRIN PO Take 81 mg by mouth daily. Yes Historical Provider   CALCIUM PHOSPHATE/VITAMIN D2 (CALCIUM-VITAMIN D2 PO) Take  by mouth three (3) times daily. Yes Historical Provider       No Known Allergies     Review of Systems:  A comprehensive review of systems was negative except for: intermittent cough, white foamy sputum     Objective:     Visit Vitals    BP 96/67    Pulse (!) 128    Temp 97.5 °F (36.4 °C)    Resp 24    Ht 5' 1\" (1.549 m)    Wt 67.6 kg (149 lb)    SpO2 93%    Breastfeeding No    BMI 28.15 kg/m2        Physical Exam:    General:  Cooperative. No acute distress. Eyes:  Conjunctivae/corneas clear    Nose: Nares normal. Septum midline. Neck: Supple, symmetrical, trachea midline, no JVD   Lungs:   Clear to auscultation bilaterally, unlabored   Heart:  Irregular, rapid   Abdomen:   Soft, non-tender, non-distended   Extremities: 1-2+ LE edema   Skin: Skin color, texture, turgor normal. No rash or lesions.    Neurologic: Nonfocal   Psych: Alert and oriented      Assessment:     Hospital Problems  Date Reviewed: 1/16/2017          Codes Class Noted POA    * (Principal)PE (pulmonary thromboembolism) ([facility] ICD-10-CM: I26.99  ICD-9-CM: 415.19  4/4/2017 Yes        Atrial fibrillation ([facility] ICD-10-CM: I48.91  ICD-9-CM: 427.31  4/4/2017 Yes        Pleural effusion ICD-10-CM: J90  ICD-9-CM: 511.9  4/4/2017 Yes        Debility ICD-10-CM: R53.81  ICD-9-CM: 799.3  4/4/2017 Yes        Hypoxemia ICD-10-CM: R09.02  ICD-9-CM: 799.02  4/4/2017 Unknown        Asthma ICD-10-CM: J45.909  ICD-9-CM: 493.90  7/1/2015 Yes              Signed By: Summer Carolina MD     April 5, 2017

## 2017-04-05 NOTE — PROGRESS NOTES
Care Management Interventions  PCP Verified by CM: Yes  Transition of Care Consult (CM Consult): Discharge Planning  Physical Therapy Consult: Yes  Occupational Therapy Consult: Yes  Current Support Network: Lives Alone  Confirm Follow Up Transport: Family  Plan discussed with Pt/Family/Caregiver: Yes    SW dc screening. Pt being bathed so spoke with pt's Anni gutierrez Stable cell: [phone], who was at bedside. Pt was living at home alone. Occasionally used a cane for ambulation PTA. Took sponge baths. Transportation by [employer] [phone]. Has a PCP and Rx plan. Home DME: cane  PT/OT consulted. Karuna plans to move pt and her mother into a trailer near her property in order to provide closer supervision for both. Will discuss dc plan once therapies have worked with pt.

## 2017-04-05 NOTE — PROGRESS NOTES
4/5/2017 6:08 AM    Admit Date: 4/4/2017    Admit Diagnosis: PE (pulmonary thromboembolism) (HCC)      Subjective:    Patient has PE and a tachyarrhythmia.  She is somewhat hypotensive, her tachycardia is a compensatory mechanism for her PE    Objective:      Visit Vitals    BP 98/74    Pulse (!) 128    Temp 97 °F (36.1 °C)    Resp 18    Ht 5' 1\" (1.549 m)    Wt 67.6 kg (149 lb)    SpO2 100%    Breastfeeding No    BMI 28.15 kg/m2       ROS:  General ROS: negative for - chills  Hematological and Lymphatic ROS: negative for - blood clots or jaundice  Respiratory ROS: no cough, shortness of breath, or wheezing  Cardiovascular ROS: no chest pain or dyspnea on exertion  Gastrointestinal ROS: no abdominal pain, change in bowel habits, or black or bloody stools  Neurological ROS: no TIA or stroke symptoms    Physical Exam:    Physical Examination: General appearance - alert, well appearing, and in no distress  Mental status - alert, oriented to person, place, and time  Eyes - pupils equal and reactive, extraocular eye movements intact  Neck/lymph - supple, no significant adenopathy  Chest/CV - clear to auscultation, no wheezes, rales or rhonchi, symmetric air entry  Heart - irregularly irregular rhythm with rate 120  Abdomen/GI - soft, nontender, nondistended, no masses or organomegaly  Musculoskeletal - no joint tenderness, deformity or swelling  Extremities - peripheral pulses normal, no pedal edema, no clubbing or cyanosis  Skin - normal coloration and turgor, no rashes, no suspicious skin lesions noted    Current Facility-Administered Medications   Medication Dose Route Frequency    sodium chloride (NS) flush 5-10 mL  5-10 mL IntraVENous Q8H    sodium chloride (NS) flush 5-10 mL  5-10 mL IntraVENous PRN    heparin 25,000 units in dextrose 500 mL infusion  18-36 Units/kg/hr IntraVENous TITRATE    atorvastatin (LIPITOR) tablet 40 mg  40 mg Oral QHS    hydroCHLOROthiazide (HYDRODIURIL) tablet 25 mg  25 mg Oral DAILY    lisinopril (PRINIVIL, ZESTRIL) tablet 20 mg  20 mg Oral DAILY    loratadine (CLARITIN) tablet 10 mg  10 mg Oral DAILY    niacin ER (NIASPAN) tablet 500 mg  500 mg Oral QHS    pantoprazole (PROTONIX) tablet 40 mg  40 mg Oral ACB    sodium chloride (NS) flush 5-10 mL  5-10 mL IntraVENous Q8H    sodium chloride (NS) flush 5-10 mL  5-10 mL IntraVENous PRN    LORazepam (ATIVAN) injection 1 mg  1 mg IntraVENous Q6H PRN    levalbuterol (XOPENEX) nebulizer soln 0.63 mg/3 mL  0.63 mg Nebulization Q6H RT    insulin regular (NOVOLIN R, HUMULIN R) injection   SubCUTAneous AC&HS    amiodarone (CORDARONE) 450 mg in dextrose 5% 250 mL infusion  0.5 mg/min IntraVENous CONTINUOUS    acetaminophen (TYLENOL) tablet 650 mg  650 mg Oral Q6H PRN       Data Review:   @LABRCNT(Na,K,BUN,CREA,WBC,HGB,HCT,PLT,INR,TRP,TCHOL*,Triglyceride*,LDL*,LDLCPOC HDL*,HDL])@    TELEMETRY: ST    Assessment/Plan:     Principal Problem:    PE (pulmonary thromboembolism) (HCC) (4/4/2017) heparin gtt. In spite of age will need NOAC    Active Problems:    Asthma (7/1/2015)      Atrial fibrillation ([facility] (4/4/2017) rate control with amiodarone gtt.  Did not tolerate cardizem gtt      Pleural effusion (4/4/2017)      Debility (4/4/2017)placement       Hypoxemia (4/4/2017)          Martha Jacob MD

## 2017-04-05 NOTE — PROGRESS NOTES
PT note: Orders received and reviewed. Pt on heparin <24 hours. Will check back tomorrow.      Raymona Quant SPT

## 2017-04-05 NOTE — PROGRESS NOTES
PTT > 100 again. Spoke to Dr. Janelle Medrano early and since not planning any intervention, he was also agreeable with xarelto. So will start at 9 PM. Stop heparin now. Nurse aware of plan. Will check CBC tomorrow.     Malini Anthony MD

## 2017-04-05 NOTE — PROGRESS NOTES
Problem: Mobility Impaired (Adult and Pediatric)  Goal: *Acute Goals and Plan of Care (Insert Text)  LTG:  (1.)Ms. Ryan Weinstein will move from supine to sit and sit to supine , scoot up and down and roll side to side in bed with STAND BY ASSIST within 7 day(s). (2.)Ms. Ryan Weinstein will transfer from bed to chair and chair to bed with STAND BY ASSIST using the least restrictive device within 7 day(s). (3.)Ms. Ryan Weinstein will ambulate with CONTACT GUARD ASSIST for 250 feet with the least restrictive device within 7 day(s). (4.)Ms. Ryan Weinstein will negotiate up/down 2 steps with with CONTACT GUARD ASSIST within 7 days. ________________________________________________________________________________________________      PHYSICAL THERAPY: INITIAL ASSESSMENT, PM 4/5/2017  INPATIENT: Hospital Day: 2  Payor: [employer] C[employer] PLUS / [place] [employer] PLUS / Product Type: Managed Care Medicare /      NAME/AGE/GENDER: Sahara Armando is a 80 y.o. female  PRIMARY DIAGNOSIS: PE (pulmonary thromboembolism) ([address] PE (pulmonary thromboembolism) ([facility] [address] PE (pulmonary thromboembolism) ([facility] [address]        ICD-10: Treatment Diagnosis:       · Generalized Muscle Weakness (M62.81)  · Other lack of cordination (R27.8)  · Difficulty in walking, Not elsewhere classified (R26.2)   Precaution/Allergies:  Review of patient's allergies indicates no known allergies. ASSESSMENT:      Ms. Ryan Weinstein presents to physical therapy after admission for PE. She is supine upon arrival, daughter at bedside, agreeable to PT assessment. Very [place] which affects command following. At baseline. Ms. Ryan Weinstein is independent with ADLs, uses cane, and lives with daughter who is home during the day. Performs bed mobility SBA with increased time required. /80 and O2 sats 98% on 2L; NC removed for mobility. Stands with CGA. Begins AMB with cane MOD A x2 and is extremely unsteady, getting tangled up in IV lines. Pt given RW.  ' with RW and MOD A x2 for balance, cueing for safety awareness and safe walker management. Not much improvement in gait with use of RW as she walks with it too far out in front. Demonstrates decreased LE strength and endurance. Returned to supine and positioned comfortably. O2 sats 93-94% on RA; NC replaced. Miguelito Daughertyodore will benefit from acute care PT to address strength, balance, endurance, functional mobility, and overall safety awareness. Talked with daughter about STR. She states that this walking pattern is typical for her mother and that she has 24/7 supervision at home. Neither she nor pt seem interested in pursuing STR; decreased insight into deficits and need for assistance/safety. Pt is very unsteady on her feet and is high fall risk even with family supervision. PT recommendation is STR. This section established at most recent assessment   PROBLEM LIST (Impairments causing functional limitations):  1. Decreased Strength  2. Decreased ADL/Functional Activities  3. Decreased Transfer Abilities  4. Decreased Ambulation Ability/Technique  5. Decreased Balance  6. Decreased Activity Tolerance  7. Increased Fatigue  8. Increased Shortness of Breath    INTERVENTIONS PLANNED: (Benefits and precautions of physical therapy have been discussed with the patient.)  1. Balance Exercise  2. Bed Mobility  3. Family Education  4. Gait Training  5. Therapeutic Activites  6. Therapeutic Exercise/Strengthening  7. Transfer Training  8. Group Therapy      TREATMENT PLAN: Frequency/Duration: 3 times a week for duration of hospital stay  Rehabilitation Potential For Stated Goals: FAIR      RECOMMENDED REHABILITATION/EQUIPMENT: (at time of discharge pending progress): Continue Skilled Therapy and Rehab. HISTORY:   History of Present Injury/Illness (Reason for Referral):  Per H&P, \"Patient is a 80 y.o.  female presents with SOB and R shoulder pain. She went to see her PCP yesterday and she was told she had something wrong with her heart and she was place don lasix. She has LE edema. She denies chest pain. She presented with A fib qith RVR and also PE. I was asked to admit her. \"  Past Medical History/Comorbidities:   Ms. Kimberlee Jimenez  has a past medical history of Asthma (7/1/2015); Heartburn (7/1/2015); Hyperlipidemia (7/1/2015); Hypertension (7/1/2015); [facility] and Urge incontinence (7/1/2015). Ms. Kimberlee Jimenez  has a past surgical history that includes cataract removal (2011). Social History/Living Environment:   Home Environment: Private residence  # Steps to Enter: 2  One/Two Story Residence: One story  Living Alone: Yes  Support Systems: Family member(s)  Patient Expects to be Discharged to[de-identified] Private residence  Current DME Used/Available at Home: Lupie Cabezas, quad, Walker, rolling  Prior Level of Function/Work/Activity:  Independent. Some assistance with mobility. Uses cane. Number of Personal Factors/Comorbidities that affect the Plan of Care:  Age  Decreased insight into deficits  Unsteady on feet  SOB/hx of PE 3+: HIGH COMPLEXITY   EXAMINATION:   Most Recent Physical Functioning:   Gross Assessment:  AROM: Generally decreased, functional  Strength: Generally decreased, functional               Posture:  Posture (WDL): Exceptions to WDL  Posture Assessment: Kyphosis, Forward head, Rounded shoulders  Balance:  Sitting: Intact  Standing: Impaired  Standing - Static: Fair  Standing - Dynamic : Poor Bed Mobility:  Rolling: Stand-by asssistance; Additional time  Supine to Sit: Stand-by asssistance; Additional time  Sit to Supine: Stand-by asssistance  Scooting: Stand-by asssistance  Wheelchair Mobility:     Transfers:  Sit to Stand: Contact guard assistance  Stand to Sit: Contact guard assistance  Gait:     Base of Support: Center of gravity altered  Speed/[place]: Fluctuations  Step Length: Left shortened;Right shortened  Gait Abnormalities: Path deviations;Trunk sway increased;Decreased step clearance  Distance (ft): 100 Feet (ft)  Assistive Device: Walker, rolling  Ambulation - Level of Assistance: Moderate assistance       Body Structures Involved:  1. Lungs Body Functions Affected:  1. Respiratory  2. Neuromusculoskeletal  3. Movement Related Activities and Participation Affected:  1. General Tasks and Demands  2. Mobility  3. Self Care  4. Domestic Life  5. Community, Social and [place]   Number of elements that affect the Plan of Care: 4+: HIGH COMPLEXITY   CLINICAL PRESENTATION:   Presentation: Stable and uncomplicated: LOW COMPLEXITY   CLINICAL DECISION MAKIN [facility] Short Form  How much difficulty does the patient currently have. .. Unable A Lot A Little None   1. Turning over in bed (including adjusting bedclothes, sheets and blankets)? [ ] 1   [ ] 2   [ ] 3   [X] 4   2. Sitting down on and standing up from a chair with arms ( e.g., wheelchair, bedside commode, etc.)   [ ] 1   [ ] 2   [ ] 3   [X] 4   3. Moving from lying on back to sitting on the side of the bed? [ ] 1   [ ] 2   [ ] 3   [X] 4   How much help from another person does the patient currently need. .. Total A Lot A Little None   4. Moving to and from a bed to a chair (including a wheelchair)? [ ] 1   [X] 2   [ ] 3   [ ] 4   5. Need to walk in hospital room? [ ] 1   [X] 2   [ ] 3   [ ] 4   6. Climbing 3-5 steps with a railing? [X] 1   [ ] 2   [ ] 3   [ ] 4   © , Trustees of [address] [place] [address], under license to [employer]. All rights reserved    Score:  Initial: 17 Most Recent: X (Date: 17 )     Interpretation of Tool:  Represents activities that are increasingly more difficult (i.e. Bed mobility, Transfers, Gait).        Score 24 23 22-20 19-15 14-10 9-7 6       Modifier CH CI CJ CK CL CM CN         · Mobility - Walking and Moving Around:              Y38 - CURRENT STATUS:    CK - 40%-59% impaired, limited or restricted               - GOAL STATUS:            - 20%-39% impaired, limited or restricted               - D/C STATUS:                       ---------------To be determined---------------  Payor: [employer] C[employer] PLUS / [place] [employer] VIP [employer] [place]US / Product Type: Managed Care Medicare /       Medical Necessity:     · Patient is expected to demonstrate progress in strength, balance, coordination and functional technique to improve safety during bed mobility, transfers, and ambulation. Reason for Services/Other Comments:  · Patient continues to require present interventions due to patient's inability to safely transfer and ambulate with assistive device. Use of outcome tool(s) and clinical judgement create a POC that gives a: Questionable prediction of patient's progress: MODERATE COMPLEXITY                 TREATMENT:   (In addition to Assessment/Re-Assessment sessions the following treatments were rendered)   Pre-treatment Symptoms/Complaints:  \"I walk pretty good\"  Pain: Initial:   Pain Intensity 1: 0  Post Session:  none      Assessment/Reassessment only, no treatment provided today     Braces/Orthotics/Lines/Etc:   · IV  · NC 2L  Treatment/Session Assessment:    · Response to Treatment:  Poor safety awareness, decreased balance  · Interdisciplinary Collaboration:  · Physical Therapist  · Registered Nurse  · After treatment position/precautions:  · Supine in bed  · Bed/Chair-wheels locked  · Bed in low position  · Call light within reach  · Family at bedside  · Compliance with Program/Exercises: Will assess as treatment progresses. · Recommendations/Intent for next treatment session: \"Next visit will focus on advancements to more challenging activities and reduction in assistance provided\".   Total Treatment Duration:  PT Patient Time In/Time Out  Time In: 8736  Time Out: [address]

## 2017-04-05 NOTE — PROGRESS NOTES
Bedside and Verbal shift change report given to self (oncoming nurse) by Amrit Marques RN (offgoing nurse). Report included the following information SBAR, Kardex, MAR and Recent Results.  Amio gtt visualized with filter in place

## 2017-04-05 NOTE — PROGRESS NOTES
Problem: Falls - Risk of  Goal: *Absence of falls  Outcome: Progressing Towards Goal  Pt understands to call for assistance. Call is within reach. Pt has family member at bedside.

## 2017-04-05 NOTE — PROGRESS NOTES
Bedside and Verbal shift change report given to Parminder Barahona. Report included the following information SBAR, Kardex, Intake/Output, MAR and Accordion. Heparin gtt verified.  See [facility]

## 2017-04-06 ENCOUNTER — APPOINTMENT (OUTPATIENT)
Dept: GENERAL RADIOLOGY | Age: 82
DRG: 176 | End: 2017-04-06
Attending: INTERNAL MEDICINE
Payer: COMMERCIAL

## 2017-04-06 LAB
GLUCOSE BLD STRIP.AUTO-MCNC: 112 MG/DL (ref 65–100)
GLUCOSE BLD STRIP.AUTO-MCNC: 178 MG/DL (ref 65–100)
GLUCOSE BLD STRIP.AUTO-MCNC: 85 MG/DL (ref 65–100)
GLUCOSE BLD STRIP.AUTO-MCNC: 91 MG/DL (ref 65–100)
TSH SERPL DL<=0.005 MIU/L-ACNC: 2.19 UIU/ML (ref 0.36–3.74)

## 2017-04-06 PROCEDURE — 99232 SBSQ HOSP IP/OBS MODERATE 35: CPT | Performed by: INTERNAL MEDICINE

## 2017-04-06 PROCEDURE — 73030 X-RAY EXAM OF SHOULDER: CPT

## 2017-04-06 PROCEDURE — 77010033678 HC OXYGEN DAILY

## 2017-04-06 PROCEDURE — 94760 N-INVAS EAR/PLS OXIMETRY 1: CPT

## 2017-04-06 PROCEDURE — 74011250637 HC RX REV CODE- 250/637: Performed by: NURSE PRACTITIONER

## 2017-04-06 PROCEDURE — 36415 COLL VENOUS BLD VENIPUNCTURE: CPT | Performed by: INTERNAL MEDICINE

## 2017-04-06 PROCEDURE — 97166 OT EVAL MOD COMPLEX 45 MIN: CPT

## 2017-04-06 PROCEDURE — 74011250637 HC RX REV CODE- 250/637: Performed by: INTERNAL MEDICINE

## 2017-04-06 PROCEDURE — 74011250636 HC RX REV CODE- 250/636: Performed by: NURSE PRACTITIONER

## 2017-04-06 PROCEDURE — 74011000250 HC RX REV CODE- 250: Performed by: INTERNAL MEDICINE

## 2017-04-06 PROCEDURE — 94640 AIRWAY INHALATION TREATMENT: CPT

## 2017-04-06 PROCEDURE — 84443 ASSAY THYROID STIM HORMONE: CPT | Performed by: INTERNAL MEDICINE

## 2017-04-06 PROCEDURE — 74011636637 HC RX REV CODE- 636/637: Performed by: INTERNAL MEDICINE

## 2017-04-06 PROCEDURE — 82962 GLUCOSE BLOOD TEST: CPT

## 2017-04-06 PROCEDURE — 65660000000 HC RM CCU STEPDOWN

## 2017-04-06 PROCEDURE — 97530 THERAPEUTIC ACTIVITIES: CPT

## 2017-04-06 PROCEDURE — 74011250636 HC RX REV CODE- 250/636: Performed by: INTERNAL MEDICINE

## 2017-04-06 PROCEDURE — 73090 X-RAY EXAM OF FOREARM: CPT

## 2017-04-06 RX ORDER — FUROSEMIDE 10 MG/ML
40 INJECTION INTRAMUSCULAR; INTRAVENOUS ONCE
Status: COMPLETED | OUTPATIENT
Start: 2017-04-06 | End: 2017-04-06

## 2017-04-06 RX ADMIN — LISINOPRIL 20 MG: 20 TABLET ORAL at 08:27

## 2017-04-06 RX ADMIN — Medication 5 ML: at 14:30

## 2017-04-06 RX ADMIN — ATORVASTATIN CALCIUM 40 MG: 40 TABLET, FILM COATED ORAL at 21:29

## 2017-04-06 RX ADMIN — PANTOPRAZOLE SODIUM 40 MG: 40 TABLET, DELAYED RELEASE ORAL at 08:27

## 2017-04-06 RX ADMIN — FUROSEMIDE 40 MG: 10 INJECTION, SOLUTION INTRAMUSCULAR; INTRAVENOUS at 14:30

## 2017-04-06 RX ADMIN — RIVAROXABAN 15 MG: 15 TABLET, FILM COATED ORAL at 17:14

## 2017-04-06 RX ADMIN — NIACIN 500 MG: 500 TABLET, FILM COATED, EXTENDED RELEASE ORAL at 21:29

## 2017-04-06 RX ADMIN — LEVALBUTEROL HYDROCHLORIDE 0.63 MG: 0.63 SOLUTION RESPIRATORY (INHALATION) at 07:27

## 2017-04-06 RX ADMIN — AMIODARONE HYDROCHLORIDE 0.5 MG/MIN: 50 INJECTION, SOLUTION INTRAVENOUS at 09:56

## 2017-04-06 RX ADMIN — LORATADINE 10 MG: 10 TABLET ORAL at 08:27

## 2017-04-06 RX ADMIN — LEVALBUTEROL HYDROCHLORIDE 0.63 MG: 0.63 SOLUTION RESPIRATORY (INHALATION) at 19:54

## 2017-04-06 RX ADMIN — HUMAN INSULIN 2 UNITS: 100 INJECTION, SOLUTION SUBCUTANEOUS at 16:30

## 2017-04-06 RX ADMIN — RIVAROXABAN 15 MG: 15 TABLET, FILM COATED ORAL at 08:27

## 2017-04-06 RX ADMIN — Medication 5 ML: at 06:13

## 2017-04-06 RX ADMIN — ACETAMINOPHEN 650 MG: 325 TABLET, FILM COATED ORAL at 11:52

## 2017-04-06 NOTE — PROGRESS NOTES
Bedside and Verbal shift change report given to Self (oncoming nurse) by Roman Fay RN (offgoing nurse). Report included the following information SBAR, Kardex, MAR and Recent Results.  Amio gtt visualized with offgoing RN

## 2017-04-06 NOTE — PROGRESS NOTES
[facility] CARDIOLOGY PROGRESS NOTE    4/6/2017 12:26 PM    Admit Date: 4/4/2017    Admit Diagnosis: PE (pulmonary thromboembolism) (HCC)      Subjective:   No angina, CHF, or palpitations but compensatory tachycardia persists today, clinically asymptomatic. Objective:      Vitals:    04/06/17 0437 04/06/17 0728 04/06/17 0926 04/06/17 1218   BP: 93/65  93/58    Pulse: (!) 125  (!) 123    Resp: 20  24    Temp: 97.9 °F (36.6 °C)  95.2 °F (35.1 °C)    SpO2: 100% 100% 100%    Weight:    68.5 kg (151 lb)   Height:           Physical Exam:  Neck- supple, no JVD  CV- regular rate and rhythm no MRG  Lung- clear bilaterally, decreased bibasilar  Abd- soft, nontender, nondistended  Ext- no edema  Skin- warm and dry    Data Review:   Recent Labs      04/05/17   1530  04/05/17   0336  04/04/17   1847  04/04/17   1214   NA   --   145   --   144   K   --   3.7   --   4.1   MG   --    --    --   1.6*   BUN   --   22   --   21   CREA   --   1.29*   --   1.21*   GLU   --   98   --   133*   WBC  6.1  5.4   --   5.8   HGB  11.1*  10.1*   --   10.8*   HCT  34.1*  32.1*   --   34.2*   PLT  177  153   --   169   INR   --    --   1.3*   --        Assessment and Plan:     Principal Problem:      PE (pulmonary thromboembolism) ([facility]) (4/4/2017)- stable without much in way of symptoms at present but with persistent tachycardia, probably compensatory. Continue anticoagulation, tolerating well thus far. Check echo for RV size and function, rule out effusion, etc.     Active Problems:      Tachycardia - compensatory- stop lisinopril today, start CCB or BB's tomorrow if able to from BP perspective      Pleural effusion (4/4/2017)- CXR monitoring per pulmonary      Debility (4/4/2017)- rehab after discharge      Hypoxemia (4/4/2017)- O2 as needed, per pulmonary      A.  Angeles Gee MD  [facility] Cardiology  Pager [phone]

## 2017-04-06 NOTE — PROGRESS NOTES
Asthma education competed with daughter and patient. Asthma is typically well controled at home. Patient only uses Advair daily. If nebulized treatments need to continue post discharge patient will need her own nebulizer.

## 2017-04-06 NOTE — PROGRESS NOTES
SW spoke with pt's je, [address] [facility] [address], [phone], re dc plan. She will take pt home with her at [place]. Care location:  [facility], [address], [address]  Declines STR but is agreeable to [facility] [address] [facility] and PT/OT.   Will make [facility] referral.

## 2017-04-06 NOTE — PROGRESS NOTES
Palliative Care Progress Note    Patient: Malka Thomas MRN: [id number]  SSN: [id number]    YOB: 1926  Age: 80 y.o. Sex: female       Assessment/Plan:     Chief Complaint/Interval History: heart rate remains high     Principal Diagnosis:    · Dyspnea  R06.00    Additional Diagnoses:   · Cough  R05  · Debility, Unspecified  R53.81  · Frailty  R54  · Counseling, Encounter for Medical Advice  Z71.9  · Encounter for Palliative Care  Z51.5    Palliative Performance Scale (PPS)  PPS: 60    Medical Decision Making:   Reviewed and summarized recent notes  Discussed case with appropriate providers. Alena FRIEDMAN  Reviewed laboratory and x-ray data. CBC, CMP, BNP    Discussed at bedside with patient and daughter Catarino Goode at bedside. Reviewed note yesterday by Alena FRIEDMAN and discussed with her. Patient states she does not want to move to different location, daughter states that she will stay with patient. Patient and daughter vehemently opposed to going to a nursing home. We discussed prognosis, medical status and future expectations. Patient and daughter desire continued medical care to optimize function, but that if a life ending event occurs, she desires a natural death, she feels she has lived a full life and looks forward to joining her  family members, DNR order entered accordingly. We discussed the option of getting more help in the home, they may be open to home health possibly with a bridge to hospice depending on how patient progresses. Will discuss findings with members of the interdisciplinary team.         More than 50% of this 35 minute visit was spent counseling and coordination of care as outlined above.     Subjective:     Review of Systems:  A comprehensive review of systems was negative except for: generalized weakness, cough     Objective:     Visit Vitals    BP 93/58    Pulse (!) 123    Temp 95.2 °F (35.1 °C)    Resp 24    Ht 5' 1\" (1.549 m)    Wt 67.6 kg (149 lb)    SpO2 100%    Breastfeeding No    BMI 28.15 kg/m2       Physical Exam:    General:  Frail, debilitated   Eyes:  Conjunctivae/corneas clear    Nose: Nares normal. Septum midline.    Neck: Supple, symmetrical, trachea midline, no JVD   Lungs:   Mildly labored   Heart:  rate 125-130    Abdomen:    non-distended   Extremities: Normal, atraumatic, no cyanosis    Skin: Fragile   Neurologic: Nonfocal   Psych: Alert and oriented     Signed By: Jennifer Medrano MD     April 6, 2017

## 2017-04-06 NOTE — PROGRESS NOTES
Problem: Mobility Impaired (Adult and Pediatric)  Goal: *Acute Goals and Plan of Care (Insert Text)  LTG:  (1.)Ms. Miguelito Shea will move from supine to sit and sit to supine , scoot up and down and roll side to side in bed with STAND BY ASSIST within 7 day(s). (2.)Ms. Miguelito Shea will transfer from bed to chair and chair to bed with STAND BY ASSIST using the least restrictive device within 7 day(s). (3.)Ms. Miguelito Shea will ambulate with CONTACT GUARD ASSIST for 250 feet with the least restrictive device within 7 day(s). (4.)Ms. Miguelito Shea will negotiate up/down 2 steps with with CONTACT GUARD ASSIST within 7 days. ________________________________________________________________________________________________      PHYSICAL THERAPY: Daily Note, Treatment Day: 1st and AM 4/6/2017  INPATIENT: Hospital Day: 3  Payor: [employer]US / [place] [employer]US / Product Type: Managed Care Medicare /      NAME/AGE/GENDER: Guero Jeffrey is a 80 y.o. female  PRIMARY DIAGNOSIS: PE (pulmonary thromboembolism) ([address] PE (pulmonary thromboembolism) ([facility] [address] PE (pulmonary thromboembolism) ([facility] [address]        ICD-10: Treatment Diagnosis:       · Generalized Muscle Weakness (M62.81)  · Other lack of cordination (R27.8)  · Difficulty in walking, Not elsewhere classified (R26.2)   Precaution/Allergies:  Review of patient's allergies indicates no known allergies. ASSESSMENT:      Ms. Miguelito Shea presents sitting EOB with OT finishing evaluation. Pt agreeable to ambulate with PT. Pt demo forward flexed posture, required cues for walker safety. Pt with accelerated gait, cues to decrease speed in order to increase safety with ambulation and energy conversation. Pt required MIN A x 2 for increased safety, conts to be a fall risk at this time, demonstrates impulsivity. PT to cont to follow for acute care needs, pt would benefit from rehab at discharge; however, understand family would like to take home with HHPT.       This section established at most recent assessment   PROBLEM LIST (Impairments causing functional limitations):  1. Decreased Strength  2. Decreased ADL/Functional Activities  3. Decreased Transfer Abilities  4. Decreased Ambulation Ability/Technique  5. Decreased Balance  6. Decreased Activity Tolerance  7. Increased Fatigue  8. Increased Shortness of Breath    INTERVENTIONS PLANNED: (Benefits and precautions of physical therapy have been discussed with the patient.)  1. Balance Exercise  2. Bed Mobility  3. Family Education  4. Gait Training  5. Therapeutic Activites  6. Therapeutic Exercise/Strengthening  7. Transfer Training  8. Group Therapy      TREATMENT PLAN: Frequency/Duration: 3 times a week for duration of hospital stay  Rehabilitation Potential For Stated Goals: FAIR      RECOMMENDED REHABILITATION/EQUIPMENT: (at time of discharge pending progress): Continue Skilled Therapy and Rehab. HISTORY:   History of Present Injury/Illness (Reason for Referral):  Per H&P, \"Patient is a 80 y.o.  female presents with SOB and R shoulder pain. She went to see her PCP yesterday and she was told she had something wrong with her heart and she was place don las. She has LE edema. She denies chest pain. She presented with A fib qith RVR and also PE. I was asked to admit her. \"  Past Medical History/Comorbidities:   Ms. Sonny Chris  has a past medical history of Asthma (7/1/2015); Heartburn (7/1/2015); Hyperlipidemia (7/1/2015); Hypertension (7/1/2015); Type 2 diabetes mellitus ([facility] [address] and Urge incontinence (7/1/2015). Ms. Sonny Chris  has a past surgical history that includes cataract removal (2011).   Social History/Living Environment:   Home Environment: Private residence  # Steps to Enter: 3  One/Two Story Residence: One story  Living Alone:  (moving in with daughter)  Support Systems: Family member(s)  Patient Expects to be Discharged to[de-identified] Private residence  Current DME Used/Available at Home: [address], [place], [facility], [address] [place] [address] bars  Tub or Shower Type: Tub/Shower combination  Prior Level of Function/Work/Activity:  Independent. Some assistance with mobility. Uses cane. Number of Personal Factors/Comorbidities that affect the Plan of Care:  Age  Decreased insight into deficits  Unsteady on feet  SOB/hx of PE 3+: HIGH COMPLEXITY   EXAMINATION:   Most Recent Physical Functioning:   Gross Assessment:                  Posture:  Posture Assessment: Forward head, Kyphosis, Rounded shoulders  Balance:    Bed Mobility:     Wheelchair Mobility:     Transfers:     Gait:     Base of Support: Center of gravity altered  Speed/[place]: Accelerated; Fluctuations (cues required to decreased speed)  Step Length: Left lengthened;Right lengthened  Gait Abnormalities: Decreased step clearance; Path deviations (forward flexed posture)  Distance (ft): 200 Feet (ft)  Assistive Device: Gait belt;Walker, rolling  Ambulation - Level of Assistance: Minimal assistance (x 2 for increased safety)       Body Structures Involved:  1. Lungs Body Functions Affected:  1. Respiratory  2. Neuromusculoskeletal  3. Movement Related Activities and Participation Affected:  1. General Tasks and Demands  2. Mobility  3. Self Care  4. Domestic Life  5. Community, Social and [place]   Number of elements that affect the Plan of Care: 4+: HIGH COMPLEXITY   CLINICAL PRESENTATION:   Presentation: Stable and uncomplicated: LOW COMPLEXITY   CLINICAL DECISION MAKIN [facility] Inpatient Short Form  How much difficulty does the patient currently have. .. Unable A Lot A Little None   1. Turning over in bed (including adjusting bedclothes, sheets and blankets)? [ ] 1   [ ] 2   [ ] 3   [X] 4   2. Sitting down on and standing up from a chair with arms ( e.g., wheelchair, bedside commode, etc.)   [ ] 1   [ ] 2   [ ] 3   [X] 4   3. Moving from lying on back to sitting on the side of the bed?    [ ] 1   [ ] 2   [ ] 3   [X] 4   How much help from another person does the patient currently need. .. Total A Lot A Little None   4. Moving to and from a bed to a chair (including a wheelchair)? [ ] 1   [X] 2   [ ] 3   [ ] 4   5. Need to walk in hospital room? [ ] 1   [X] 2   [ ] 3   [ ] 4   6. Climbing 3-5 steps with a railing? [X] 1   [ ] 2   [ ] 3   [ ] 4   © 2007, Trustees of [address] [place] [address] [address] [address], under license to [employer]. All rights reserved    Score:  Initial: 17 Most Recent: X (Date: 4/5/17 )     Interpretation of Tool:  Represents activities that are increasingly more difficult (i.e. Bed mobility, Transfers, Gait). Score 24 23 22-20 19-15 14-10 9-7 6       Modifier CH CI CJ CK CL CM CN         · Mobility - Walking and Moving Around:               - CURRENT STATUS:    CK - 40%-59% impaired, limited or restricted               - GOAL STATUS:           CJ - 20%-39% impaired, limited or restricted               - D/C STATUS:                       ---------------To be determined---------------  Payor: [employer] C[employer] PLUS / [place] [employer] PLUS / Product Type: Managed Care Medicare /       Medical Necessity:     · Patient is expected to demonstrate progress in strength, balance, coordination and functional technique to improve safety during bed mobility, transfers, and ambulation. Reason for Services/Other Comments:  · Patient continues to require present interventions due to patient's inability to safely transfer and ambulate with assistive device. Use of outcome tool(s) and clinical judgement create a POC that gives a: Questionable prediction of patient's progress: MODERATE COMPLEXITY                 TREATMENT:   (In addition to Assessment/Re-Assessment sessions the following treatments were rendered)   Pre-treatment Symptoms/Complaints:  \"ok let's go\"  Pain: Initial:   Pain Intensity 1: 0  Post Session:  none      Therapeutic Activity: (    10 min):   Therapeutic activities including Ambulation on level ground, sit to stand transfers, walker safety to improve mobility, strength, balance and coordination. Required minimal   to promote static and dynamic balance in standing and promote coordination of bilateral, lower extremity(s). Braces/Orthotics/Lines/Etc:   · IV  · NC 2L  Treatment/Session Assessment:    · Response to Treatment:  Poor safety awareness, decreased balance  · Interdisciplinary Collaboration:  · Physical Therapist  · Registered Nurse  · After treatment position/precautions:  · Supine in bed, Bed/Chair-wheels locked, Bed in low position, Call light within reach and Family at bedside  · Compliance with Program/Exercises: Will assess as treatment progresses. · Recommendations/Intent for next treatment session: \"Next visit will focus on advancements to more challenging activities and reduction in assistance provided\".   Total Treatment Duration:  PT Patient Time In/Time Out  Time In: 1122  Time Out: Bunny Saeed PT

## 2017-04-06 NOTE — PROGRESS NOTES
Britton Credit  Admission Date: 4/4/2017             Daily Progress Note: 4/6/2017    The patient's chart is reviewed and the patient is discussed with the staff. 89yo admitted with Afib RVR, PE. Subjective:     Breathing better. Ambulated in vital. On amiodarone drip with VR about 123. RUE pain persists. Switched to Xarelto last night for high PTTs on heparin.     Current Facility-Administered Medications   Medication Dose Route Frequency    amiodarone (CORDARONE) 450 mg in 0.9% sodium chloride 250 mL infusion  0.5-1 mg/min IntraVENous TITRATE    rivaroxaban (XARELTO) tablet 15 mg  15 mg Oral BID WITH MEALS    atorvastatin (LIPITOR) tablet 40 mg  40 mg Oral QHS    hydroCHLOROthiazide (HYDRODIURIL) tablet 25 mg  25 mg Oral DAILY    lisinopril (PRINIVIL, ZESTRIL) tablet 20 mg  20 mg Oral DAILY    loratadine (CLARITIN) tablet 10 mg  10 mg Oral DAILY    niacin ER (NIASPAN) tablet 500 mg  500 mg Oral QHS    pantoprazole (PROTONIX) tablet 40 mg  40 mg Oral ACB    sodium chloride (NS) flush 5-10 mL  5-10 mL IntraVENous Q8H    sodium chloride (NS) flush 5-10 mL  5-10 mL IntraVENous PRN    LORazepam (ATIVAN) injection 1 mg  1 mg IntraVENous Q6H PRN    levalbuterol (XOPENEX) nebulizer soln 0.63 mg/3 mL  0.63 mg Nebulization Q6H RT    insulin regular (NOVOLIN R, HUMULIN R) injection   SubCUTAneous AC&HS    acetaminophen (TYLENOL) tablet 650 mg  650 mg Oral Q6H PRN       Review of Systems  Constitutional: negative for fever, chills, sweats  Cardiovascular: negative for chest pain, palpitations, syncope, + edema  Gastrointestinal:  negative for dysphagia, reflux, vomiting, diarrhea, abdominal pain, or melena  Neurologic:  negative for focal weakness, numbness, headache    Objective:     Vitals:    04/06/17 0240 04/06/17 0437 04/06/17 0728 04/06/17 0926   BP: 100/58 93/65  93/58   Pulse: (!) 126 (!) 125  (!) 123   Resp: 20 20  24   Temp: 97.8 °F (36.6 °C) 97.9 °F (36.6 °C)  95.2 °F (35.1 °C)   SpO2: 99% 100% 100% 100%   Weight:       Height:         Intake and Output:   04/04 1901 - 04/06 0700  In: 3289.7 [P.O.:1620; I.V.:1669.7]  Out: 500 [Urine:500]       Physical Exam:   Constitution:  the patient is elderly and in no acute distress, on 2L NC   EENMT:  Sclera clear, pupils equal, oral mucosa moist  Respiratory: diminished bases, no wheezing   Cardiovascular:  Irregular, tachy without M,G,R  Gastrointestinal: soft and non-tender; with positive bowel sounds. Musculoskeletal: warm without cyanosis. There is 1+ lower leg edema. Skin:  no jaundice or rashes, no open  Wounds; scattered ecchymoses   Neurologic: no gross neuro deficits     Psychiatric:  alert and oriented x 3     CHEST XRAY 4/4/17:       CT Chest 4/4/17:      LAB  Recent Labs      04/06/17   1115  04/06/17   0611  04/05/17   2127  04/05/17   1743  04/05/17   1145   GLUCPOC  112*  91  151*  101*  157*      Recent Labs      04/05/17   1530  04/05/17   0336  04/04/17   1847  04/04/17   1214   WBC  6.1  5.4   --   5.8   HGB  11.1*  10.1*   --   10.8*   HCT  34.1*  32.1*   --   34.2*   PLT  177  153   --   169   INR   --    --   1.3*   --      Recent Labs      04/05/17   0336  04/04/17   1214   NA  145  144   K  3.7  4.1   CL  109*  108*   CO2  26  30   GLU  98  133*   BUN  22  21   CREA  1.29*  1.21*   MG   --   1.6*   CA  8.0*  8.3   PHOS   --   3.1   ALB   --   3.8   TBILI   --   0.6   ALT   --   37   SGOT   --   41*     No results for input(s): PH, PCO2, PO2, HCO3 in the last 72 hours. No results for input(s): LCAD, LAC in the last 72 hours. Assessment:  (Medical Decision Making)     Hospital Problems  Date Reviewed: 4/5/2017          Codes Class Noted POA    * (Principal)PE (pulmonary thromboembolism) ([address] ICD-10-CM: I26.99  ICD-9-CM: 415.19  4/4/2017 Yes    Now on Xarelto.     Atrial fibrillation ([facility] ICD-10-CM: I48.91  ICD-9-CM: 427.31  4/4/2017 Yes    Continue amiodarone gtt     Pleural effusion ICD-10-CM: J90  ICD-9-CM: 511.9  4/4/2017 Yes    Check follow up CXR in 1-2 days    Debility ICD-10-CM: R53.81  ICD-9-CM: 799.3  4/4/2017 Yes    Unchanged     Hypoxemia ICD-10-CM: R09.02  ICD-9-CM: 799.02  4/4/2017 Unknown    Remains on 2L NC     Asthma ICD-10-CM: J45.909  ICD-9-CM: 493.90  7/1/2015 Yes    No wheezing           Plan:  (Medical Decision Making)     --Continue Amiodarone gtt  --Xarelto for PE/afib  --Xopenex, Claritin   --Wean O2 as tolerated   --Diurese  --Check X-ray of RUE/shoulder     More than 50% of the time documented was spent in face-to-face contact with the patient and in the care of the patient on the floor/unit where the patient is located.     Shahab Reed MD

## 2017-04-06 NOTE — ROUTINE PROCESS
MEWS score noted to be 4. Charge nurse, Ramon Faria RN informed. High MEWS score noted due to . Vitals signs to be completed every 2 hours. Amio gtt @ 0.5mg. Will continue to monitor.

## 2017-04-06 NOTE — PROGRESS NOTES
called regarding PTT send to [facility] for testing. Resulting PTT >200. Heparin gtt d/c 4/5/17.  Starting Xarelto tonight @0035

## 2017-04-06 NOTE — PROGRESS NOTES
Bedside and Verbal shift change report given to Mayda Petit RN (oncoming nurse) by self (offgoing nurse). Report included the following information SBAR, Kardex, MAR and Recent Results.  Amio gtt visualized with oncoming RN

## 2017-04-06 NOTE — PROGRESS NOTES
Problem: Self Care Deficits Care Plan (Adult)  Goal: *Acute Goals and Plan of Care (Insert Text)  1. Patient will complete lower body bathing and dressing with minimal assistance and adaptive equipment as needed. 2. Patient will complete toileting with minimal assistance. 3. Patient will tolerate 25 minutes of OT treatment with 1-2 rest breaks to increase activity tolerance for ADLs. 4. Patient will complete functional transfers with supervision and adaptive equipment as needed. 5. Patient will complete functional mobility with minimal cues for safety and minimal assistance to complete functional mobility. Timeframe: 7 visits       OCCUPATIONAL THERAPY: Initial Assessment 4/6/2017  INPATIENT: Hospital Day: 3  Payor: FIRST [employer] VIP CARE PLUS / [place] [employer] CHO[employer] VIP CARE PLUS / Product Type: Managed Care Medicare /      NAME/AGE/GENDER: Lopez Mccormack is a 80 y.o. female  PRIMARY DIAGNOSIS:  PE (pulmonary thromboembolism) ([address] PE (pulmonary thromboembolism) ([address] PE (pulmonary thromboembolism) ([address]        ICD-10: Treatment Diagnosis:        · Generalized Muscle Weakness (M62.81)  · Other lack of cordination (R27.8)  · History of falling (Z91.81)  · Abnormal posture (R29.3)   Precautions/Allergies:         Review of patient's allergies indicates no known allergies. ASSESSMENT:      Ms. Catherine Jensen presents to the hospital with PE. Pt was supine in bed upon arrival with supportive daughter at bedside. Pt is very pleasant and agreeable to working with therapy with minimal encouragement. Pt hard of hearing at times which limits command following. Pt sat to the edge of bed with SBA. Pt's IV bleeding with nurse notified. Pt stood at edge of bed to the walker with cues for hand placement and needed safety cues for use of walker and with transfers. Pt's HR in the 120's-130's with nursing approving for therapy to work with patient.  Pt lives alone currently, but daughter states she is going to live with her at discharge. Pt noted to have some decreased activity tolerance and impaired safety that puts her at greater risk for falls. Pt could potentially benefit from some rehab at discharge. Pt will benefit from OT services to address stated goals and plan of care. This section established at most recent assessment   PROBLEM LIST (Impairments causing functional limitations):  1. Decreased Strength  2. Decreased ADL/Functional Activities  3. Decreased Transfer Abilities  4. Decreased Ambulation Ability/Technique  5. Decreased Balance  6. Decreased Activity Tolerance  7. Increased Shortness of Breath  8. Decreased Flexibility/Joint Mobility  9. Decreased [place] with Home Exercise Program  10. Decreased Cognition    INTERVENTIONS PLANNED: (Benefits and precautions of occupational therapy have been discussed with the patient.)  1. Activities of daily living training  2. Adaptive equipment training  3. Balance training  4. Clothing management  5. Cognitive training  6. Donning&doffing training  7. Group therapy  8. Neuromuscular re-eduation  9. Theraputic activity  10. Theraputic exercise      TREATMENT PLAN: Frequency/Duration: Follow patient 3 times per week to address above goals. Rehabilitation Potential For Stated Goals: GOOD      RECOMMENDED REHABILITATION/EQUIPMENT: (at time of discharge pending progress): Continue Skilled Therapy and Rehab. OCCUPATIONAL PROFILE AND HISTORY:   History of Present Injury/Illness (Reason for Referral):  See H&P  Past Medical History/Comorbidities:   Ms. Tyrese Bell  has a past medical history of Asthma (7/1/2015); Heartburn (7/1/2015); Hyperlipidemia (7/1/2015); Hypertension (7/1/2015); Type 2 diabetes mellitus ([facility] and Urge incontinence (7/1/2015). Ms. Tyrese Bell  has a past surgical history that includes cataract removal (2011).   Social History/Living Environment:   Home Environment: Private residence  # Steps to Enter: 3  One/Two Story Residence: One story  Living Alone:  (moving in with daughter)  Support Systems: Family member(s)  Patient Expects to be Discharged to[de-identified] Private residence  Current DME Used/Available at Home: Cane, quad, Grab bars  Tub or Shower Type: Tub/Shower combination  Prior Level of Function/Work/Activity:  Pt was living alone and completing functional mobility with a quad cane. Pt reports she was completing ADL without assistance. Pt was still completing her own cooking/cleaning as well. Pt reports recent fall out of chair. Personal Factors:          Social Background:  Living alone previously. Hx of falls. Overall Behavior:  Pt hard of hearing with decreased safety awareness in standing/functional mobility/functional transfers        Other factors that influence how disability is experienced by the patient:  Hard of hearing   Number of Personal Factors/Comorbidities that affect the Plan of Care: Extensive review of physical, cognitive, and psychosocial performance (3+):  HIGH COMPLEXITY   ASSESSMENT OF OCCUPATIONAL PERFORMANCE[de-identified]   Activities of Daily Living:           Basic ADLs (From Assessment) Complex ADLs (From Assessment)   Basic ADL  Feeding: Stand-by assistance  Oral Facial Hygiene/Grooming: Moderate assistance  Bathing: Moderate assistance  Upper Body Dressing: Moderate assistance  Lower Body Dressing: Maximum assistance  Toileting: Moderate assistance Instrumental ADL  Meal Preparation: Maximum assistance  Homemaking: Maximum assistance   Grooming/Bathing/Dressing Activities of Daily Living     Cognitive Retraining  Safety/Judgement: Decreased awareness of environment;Decreased awareness of need for assistance;Decreased awareness of need for safety;Decreased insight into deficits; Fall prevention;Home safety                 Functional Transfers  Toilet Transfer : Minimum assistance  Tub Transfer: Maximum assistance  Shower Transfer:  Moderate assistance     Bed/Mat Mobility  Rolling: Stand-by asssistance  Supine to Sit: Stand-by asssistance  Sit to Stand: Contact guard assistance  Bed to Chair: Minimum assistance  Scooting: Stand-by asssistance          Most Recent Physical Functioning:   Gross Assessment:  AROM: Generally decreased, functional  Strength: Generally decreased, functional               Posture:  Posture (WDL): Exceptions to WDL  Posture Assessment: Kyphosis, Forward head, Rounded shoulders  Balance:  Sitting: Impaired  Sitting - Static: Good (unsupported)  Sitting - Dynamic: Fair (occasional)  Standing: Impaired  Standing - Static: Fair  Standing - Dynamic : Poor Bed Mobility:  Rolling: Stand-by asssistance  Supine to Sit: Stand-by asssistance  Scooting: Stand-by asssistance  Wheelchair Mobility:     Transfers:  Sit to Stand: Contact guard assistance  Stand to Sit: Contact guard assistance  Bed to Chair: Minimum assistance                 Patient Vitals for the past 6 hrs:       BP SpO2 O2 Flow Rate (L/min) Pulse   04/06/17 0728 - 100 % 2 l/min -   04/06/17 0827 - - 2 l/min -   04/06/17 0926 93/58 100 % 2 l/min (!) 123   04/06/17 1122 - - 2 l/min -        Mental Status  Neurologic State: Alert  Orientation Level: Oriented to person, Oriented to place  Cognition: Decreased attention/concentration, Decreased command following (impaired command following due to hearing impaired)  Perception: Appears intact  Perseveration: No perseveration noted  Safety/Judgement: Decreased awareness of environment, Decreased awareness of need for assistance, Decreased awareness of need for safety, Decreased insight into deficits, Fall prevention, Home safety                               Physical Skills Involved:  1. Balance  2. Mobility  3. Strength  4. Endurance Cognitive Skills Affected (resulting in the inability to perform in a timely and safe manner):  1. Attending  2. Perceiving  3. Understanding  4. Problem Solving  5. Mental Sequencing Psychosocial Skills Affected:  1. Habits  2.  Routines and Behaviors  3. Environmental Adaptations   Number of elements that affect the Plan of Care: 5+:  HIGH COMPLEXITY   CLINICAL DECISION MAKIN [facility] Mobility Inpatient Short Form  How much help from another person does the patient currently need. .. Total A Lot A Little None   1. Putting on and taking off regular lower body clothing?   [ ] 1   [X] 2   [ ] 3   [ ] 4   2. Bathing (including washing, rinsing, drying)? [ ] 1   [X] 2   [ ] 3   [ ] 4   3. Toileting, which includes using toilet, bedpan or urinal?   [ ] 1   [X] 2   [ ] 3   [ ] 4   4. Putting on and taking off regular upper body clothing?   [ ] 1   [X] 2   [ ] 3   [ ] 4   5. Taking care of personal grooming such as brushing teeth? [ ] 1   [X] 2   [ ] 3   [ ] 4   6. Eating meals? [ ] 1   [ ] 2   [X] 3   [ ] 4   © , Trustees of [address] [place] [address] [address] [address], under license to [employer]. All rights reserved    Score:  Initial: 13 Most Recent: X (Date: -- )     Interpretation of Tool:  Represents activities that are increasingly more difficult (i.e. Bed mobility, Transfers, Gait). Score 24 23 22-20 19-15 14-10 9-7 6       Modifier CH CI CJ CK CL CM CN         · Self Care:               - CURRENT STATUS:    CL - 60%-79% impaired, limited or restricted               - GOAL STATUS:           CK - 40%-59% impaired, limited or restricted               - D/C STATUS:                       ---------------To be determined---------------  Payor: FIRST CHO[employer] VIP CARE PLUS / [place] [employer] VIP CARE PLUS / Product Type: Managed Care Medicare /       Medical Necessity:     · Patient demonstrates good rehab potential due to higher previous functional level. Reason for Services/Other Comments:  · Patient continues to require skilled intervention due to decreased safety and increased risk for falls with ADL/functional mobility.    Use of outcome tool(s) and clinical judgement create a POC that gives a: MODERATE COMPLEXITY             TREATMENT:   (In addition to Assessment/Re-Assessment sessions the following treatments were rendered)      Pre-treatment Symptoms/Complaints:  Just reports soreness on her arms  Pain: Initial:   Pain Intensity 1: 0 Post Session:  0/10      Assessment/Reassessment only, no treatment provided today     Braces/Orthotics/Lines/Etc:   · IV  · O2 Device: Nasal cannula  Treatment/Session Assessment:    · Response to Treatment:  Evaluation only. · Interdisciplinary Collaboration:  · Occupational Therapist  · Registered Nurse  · After treatment position/precautions:  · working with PT  · Compliance with Program/Exercises: Will assess as treatment progresses. · Recommendations/Intent for next treatment session: \"Next visit will focus on advancements to more challenging activities and reduction in assistance provided\".   Total Treatment Duration:  OT Patient Time In/Time Out  Time In: 1058  Time Out: [address], OT

## 2017-04-07 PROBLEM — E83.42 HYPOMAGNESEMIA: Status: ACTIVE | Noted: 2017-04-07

## 2017-04-07 PROBLEM — Z66 DO NOT RESUSCITATE: Status: ACTIVE | Noted: 2017-04-07

## 2017-04-07 PROBLEM — N17.9 ACUTE RENAL FAILURE (ARF) (HCC): Status: ACTIVE | Noted: 2017-04-07

## 2017-04-07 PROBLEM — Z66 DO NOT RESUSCITATE: Chronic | Status: ACTIVE | Noted: 2017-04-07

## 2017-04-07 LAB
ANION GAP BLD CALC-SCNC: 13 MMOL/L (ref 7–16)
BUN SERPL-MCNC: 31 MG/DL (ref 8–23)
CALCIUM SERPL-MCNC: 8.4 MG/DL (ref 8.3–10.4)
CHLORIDE SERPL-SCNC: 107 MMOL/L (ref 98–107)
CO2 SERPL-SCNC: 24 MMOL/L (ref 21–32)
CREAT SERPL-MCNC: 2.37 MG/DL (ref 0.6–1)
ERYTHROCYTE [DISTWIDTH] IN BLOOD BY AUTOMATED COUNT: 14.8 % (ref 11.9–14.6)
GLUCOSE BLD STRIP.AUTO-MCNC: 113 MG/DL (ref 65–100)
GLUCOSE BLD STRIP.AUTO-MCNC: 146 MG/DL (ref 65–100)
GLUCOSE BLD STRIP.AUTO-MCNC: 161 MG/DL (ref 65–100)
GLUCOSE BLD STRIP.AUTO-MCNC: 169 MG/DL (ref 65–100)
GLUCOSE BLD STRIP.AUTO-MCNC: 65 MG/DL (ref 65–100)
GLUCOSE SERPL-MCNC: 59 MG/DL (ref 65–100)
HCT VFR BLD AUTO: 30.5 % (ref 35.8–46.3)
HGB BLD-MCNC: 9.8 G/DL (ref 11.7–15.4)
MAGNESIUM SERPL-MCNC: 1.7 MG/DL (ref 1.8–2.4)
MCH RBC QN AUTO: 32.7 PG (ref 26.1–32.9)
MCHC RBC AUTO-ENTMCNC: 32.1 G/DL (ref 31.4–35)
MCV RBC AUTO: 101.7 FL (ref 79.6–97.8)
PLATELET # BLD AUTO: 158 K/UL (ref 150–450)
PMV BLD AUTO: 9.7 FL (ref 10.8–14.1)
POTASSIUM SERPL-SCNC: 3.8 MMOL/L (ref 3.5–5.1)
RBC # BLD AUTO: 3 M/UL (ref 4.05–5.25)
SODIUM SERPL-SCNC: 144 MMOL/L (ref 136–145)
WBC # BLD AUTO: 4.8 K/UL (ref 4.3–11.1)

## 2017-04-07 PROCEDURE — 74011250636 HC RX REV CODE- 250/636: Performed by: INTERNAL MEDICINE

## 2017-04-07 PROCEDURE — 82962 GLUCOSE BLOOD TEST: CPT

## 2017-04-07 PROCEDURE — 74011250637 HC RX REV CODE- 250/637: Performed by: INTERNAL MEDICINE

## 2017-04-07 PROCEDURE — 74011636637 HC RX REV CODE- 636/637: Performed by: INTERNAL MEDICINE

## 2017-04-07 PROCEDURE — 85027 COMPLETE CBC AUTOMATED: CPT | Performed by: INTERNAL MEDICINE

## 2017-04-07 PROCEDURE — 74011000250 HC RX REV CODE- 250: Performed by: INTERNAL MEDICINE

## 2017-04-07 PROCEDURE — 74011250636 HC RX REV CODE- 250/636: Performed by: NURSE PRACTITIONER

## 2017-04-07 PROCEDURE — 83735 ASSAY OF MAGNESIUM: CPT | Performed by: INTERNAL MEDICINE

## 2017-04-07 PROCEDURE — 94640 AIRWAY INHALATION TREATMENT: CPT

## 2017-04-07 PROCEDURE — 94760 N-INVAS EAR/PLS OXIMETRY 1: CPT

## 2017-04-07 PROCEDURE — 93306 TTE W/DOPPLER COMPLETE: CPT

## 2017-04-07 PROCEDURE — 36415 COLL VENOUS BLD VENIPUNCTURE: CPT | Performed by: INTERNAL MEDICINE

## 2017-04-07 PROCEDURE — 74011000302 HC RX REV CODE- 302: Performed by: INTERNAL MEDICINE

## 2017-04-07 PROCEDURE — 80048 BASIC METABOLIC PNL TOTAL CA: CPT | Performed by: INTERNAL MEDICINE

## 2017-04-07 PROCEDURE — 99232 SBSQ HOSP IP/OBS MODERATE 35: CPT | Performed by: INTERNAL MEDICINE

## 2017-04-07 PROCEDURE — 74011250636 HC RX REV CODE- 250/636: Performed by: PHYSICIAN ASSISTANT

## 2017-04-07 PROCEDURE — 77010033678 HC OXYGEN DAILY

## 2017-04-07 PROCEDURE — 65660000000 HC RM CCU STEPDOWN

## 2017-04-07 PROCEDURE — 86580 TB INTRADERMAL TEST: CPT | Performed by: INTERNAL MEDICINE

## 2017-04-07 RX ORDER — MAGNESIUM SULFATE HEPTAHYDRATE 40 MG/ML
2 INJECTION, SOLUTION INTRAVENOUS ONCE
Status: COMPLETED | OUTPATIENT
Start: 2017-04-07 | End: 2017-04-08

## 2017-04-07 RX ORDER — SODIUM CHLORIDE 9 MG/ML
75 INJECTION, SOLUTION INTRAVENOUS CONTINUOUS
Status: DISPENSED | OUTPATIENT
Start: 2017-04-07 | End: 2017-04-07

## 2017-04-07 RX ORDER — METOPROLOL TARTRATE 25 MG/1
12.5 TABLET, FILM COATED ORAL EVERY 6 HOURS
Status: DISCONTINUED | OUTPATIENT
Start: 2017-04-07 | End: 2017-04-08

## 2017-04-07 RX ADMIN — RIVAROXABAN 15 MG: 15 TABLET, FILM COATED ORAL at 10:39

## 2017-04-07 RX ADMIN — METOPROLOL TARTRATE 12.5 MG: 25 TABLET ORAL at 18:51

## 2017-04-07 RX ADMIN — AMIODARONE HYDROCHLORIDE 0.5 MG/MIN: 50 INJECTION, SOLUTION INTRAVENOUS at 02:39

## 2017-04-07 RX ADMIN — SODIUM CHLORIDE 75 ML/HR: 900 INJECTION, SOLUTION INTRAVENOUS at 10:00

## 2017-04-07 RX ADMIN — MAGNESIUM SULFATE HEPTAHYDRATE 2 G: 40 INJECTION, SOLUTION INTRAVENOUS at 10:45

## 2017-04-07 RX ADMIN — TUBERCULIN PURIFIED PROTEIN DERIVATIVE 5 UNITS: 5 INJECTION INTRADERMAL at 18:48

## 2017-04-07 RX ADMIN — LEVALBUTEROL HYDROCHLORIDE 0.63 MG: 0.63 SOLUTION RESPIRATORY (INHALATION) at 19:32

## 2017-04-07 RX ADMIN — HUMAN INSULIN 2 UNITS: 100 INJECTION, SOLUTION SUBCUTANEOUS at 11:30

## 2017-04-07 RX ADMIN — NIACIN 500 MG: 500 TABLET, FILM COATED, EXTENDED RELEASE ORAL at 21:53

## 2017-04-07 RX ADMIN — LEVALBUTEROL HYDROCHLORIDE 0.63 MG: 0.63 SOLUTION RESPIRATORY (INHALATION) at 14:03

## 2017-04-07 RX ADMIN — PANTOPRAZOLE SODIUM 40 MG: 40 TABLET, DELAYED RELEASE ORAL at 10:46

## 2017-04-07 RX ADMIN — LORATADINE 10 MG: 10 TABLET ORAL at 10:44

## 2017-04-07 RX ADMIN — LEVALBUTEROL HYDROCHLORIDE 0.63 MG: 0.63 SOLUTION RESPIRATORY (INHALATION) at 01:11

## 2017-04-07 RX ADMIN — RIVAROXABAN 15 MG: 15 TABLET, FILM COATED ORAL at 18:52

## 2017-04-07 RX ADMIN — METOPROLOL TARTRATE 12.5 MG: 25 TABLET ORAL at 10:46

## 2017-04-07 RX ADMIN — ATORVASTATIN CALCIUM 40 MG: 40 TABLET, FILM COATED ORAL at 21:53

## 2017-04-07 NOTE — PROGRESS NOTES
BS 65. Pt asymptomatic. Pt states she feels fine. Provided orange juice. Beena Carbajal, RN regarding pt BS and to recheck BS in 15 minutes.

## 2017-04-07 NOTE — PROGRESS NOTES
Problem: Falls - Risk of  Goal: *Absence of falls  Outcome: Progressing Towards Goal  Patient Alert, oriented,  proper use of call light, pages for assistance before getting OOB. Nonskid socks on feet prior to getting OOB. Staff compliant with keeping bed in low, locked position; with both left and right upper side rails raised/ elevated. Bedside table and personal items within reach.

## 2017-04-07 NOTE — PROGRESS NOTES
[facility] CARDIOLOGY PROGRESS NOTE           4/7/2017 7:44 AM    Admit Date: 4/4/2017      Subjective:   Pt reports feeling OK this AM without much SOB, wearing O2, denies pain. BP up to 112/69 this AM,     ROS:  Cardiovascular:  As noted above    Objective:      Vitals:    04/07/17 0113 04/07/17 0147 04/07/17 0242 04/07/17 0507   BP:  (!) 83/51 94/55 112/69   Pulse:  (!) 118 (!) 119 (!) 121   Resp:  14  14   Temp:  97.6 °F (36.4 °C)  97.4 °F (36.3 °C)   SpO2: 96% 99%  98%   Weight:    68.5 kg (151 lb 1.6 oz)   Height:           Physical Exam:  General-No Acute Distress  Neck- supple, no JVD  CV- irregular rapid rate and rhythm  Lung- diminished BS bilaterally  Abd- soft, nontender, nondistended  Ext- no edema bilaterally. Skin- warm and dry    Data Review:   Recent Labs      04/07/17   0534  04/05/17   1530  04/05/17   0336  04/04/17   1847  04/04/17   1214   NA   --    --   145   --   144   K   --    --   3.7   --   4.1   MG   --    --    --    --   1.6*   BUN   --    --   22   --   21   CREA   --    --   1.29*   --   1.21*   GLU   --    --   98   --   133*   WBC  4.8  6.1  5.4   --   5.8   HGB  9.8*  11.1*  10.1*   --   10.8*   HCT  30.5*  34.1*  32.1*   --   34.2*   PLT  158  177  153   --   169   INR   --    --    --   1.3*   --        Assessment/Plan:     Principal Problem:    PE (pulmonary thromboembolism) ([facility] (4/4/2017)- Xarelto per pulmonary    Active Problems:    Asthma (7/1/2015)      Atrial fibrillation ([facility] (4/4/2017)- stopped Lisinopril and HCTZ for increased BP and ability to add BB, start Lopressor 12.5 mg q 6 h for better HR control, monitor      Pleural effusion (4/4/2017)      Debility (4/4/2017)      Hypoxemia (4/4/2017)- on O2 per pulmonary      Rahda MARTINEZ Nayak PA-C  4/7/2017 7:44 AM    ATTENDING ADDENDUM:    Patient seen and examined by me. Agree with above note by physician extender.   Key findings are:  No CP or ASENCIO, but flutter persistent and low BP yesterday and day prior have caused azotemia. Stop HCTZ, stopped ACE yesterday, hydrate gently 12 hours, add low dose lopressor if BP will allow and run amio gtt at 1mg/min x 24 hours today. May need to consider cardioversion if deteriorates but try to control HR medically for now since clinically asymptomatic. Check echo for RV size and function, continue xarelto. CV- rapid but regular without murmur  Lungs- Clear bilaterally  Abd- soft, nontender, nondistended  Ext- no edema    Plan: As above.     Suma Goetz MD  [facility] Cardiology  Pager [phone]

## 2017-04-07 NOTE — PROGRESS NOTES
[place]  Admission Date: 4/4/2017             Daily Progress Note: 4/7/2017    The patient's chart is reviewed and the patient is discussed with the staff. Pt is a 81 yo  female with a history of HTN, HLD, asthma, and DM2. Pt presented to the ER with afib with RVR. She was also found to have elevated ddimer and CTA chest revealed bibasilar pulmonary emboli, small bilateral pleural effusions, pulmonary edema, and large hiatal hernia. Pt initially on heparin and transitioned to PO xarelto. Pt remains on amio gtt with poor control of afib. Subjective:     Pt sitting up in bed on 2L O2. Pt on amio gtt with . Pt coughing up white phlegm. She feels ok but is weak. Shoulder xrays revealed osteopenia/osteoarthritis.      Current Facility-Administered Medications   Medication Dose Route Frequency    metoprolol tartrate (LOPRESSOR) tablet 12.5 mg  12.5 mg Oral Q6H    0.9% sodium chloride infusion  75 mL/hr IntraVENous CONTINUOUS    magnesium sulfate 2 g/50 ml IVPB (premix or compounded)  2 g IntraVENous ONCE    amiodarone (CORDARONE) 450 mg in 0.9% sodium chloride 250 mL infusion  1 mg/min IntraVENous TITRATE    rivaroxaban (XARELTO) tablet 15 mg  15 mg Oral BID WITH MEALS    atorvastatin (LIPITOR) tablet 40 mg  40 mg Oral QHS    loratadine (CLARITIN) tablet 10 mg  10 mg Oral DAILY    niacin ER (NIASPAN) tablet 500 mg  500 mg Oral QHS    pantoprazole (PROTONIX) tablet 40 mg  40 mg Oral ACB    sodium chloride (NS) flush 5-10 mL  5-10 mL IntraVENous Q8H    sodium chloride (NS) flush 5-10 mL  5-10 mL IntraVENous PRN    LORazepam (ATIVAN) injection 1 mg  1 mg IntraVENous Q6H PRN    levalbuterol (XOPENEX) nebulizer soln 0.63 mg/3 mL  0.63 mg Nebulization Q6H RT    insulin regular (NOVOLIN R, HUMULIN R) injection   SubCUTAneous AC&HS    acetaminophen (TYLENOL) tablet 650 mg  650 mg Oral Q6H PRN       Review of Systems  +cough  Constitutional: negative for fever, chills, sweats  Cardiovascular: negative for chest pain, palpitations, syncope, edema  Gastrointestinal:  negative for dysphagia, reflux, vomiting, diarrhea, abdominal pain, or melena  Neurologic:  negative for focal weakness, numbness, headache    Objective:     Vitals:    04/07/17 0113 04/07/17 0147 04/07/17 0242 04/07/17 0507   BP:  (!) 83/51 94/55 112/69   Pulse:  (!) 118 (!) 119 (!) 121   Resp:  14  14   Temp:  97.6 °F (36.4 °C)  97.4 °F (36.3 °C)   SpO2: 96% 99%  98%   Weight:    151 lb 1.6 oz (68.5 kg)   Height:         Intake and Output:   04/05 1901 - 04/07 0700  In: 3635.9 [P.O.:1500; I.V.:2135.9]  Out: 850 [Urine:850]       Physical Exam:   Constitution:  the patient is well developed and in no acute distress, on 2L O2  EENMT:  Sclera clear, pupils equal, oral mucosa moist  Respiratory: diminished  Cardiovascular:  Irregularly irregular  Gastrointestinal: soft and non-tender; with positive bowel sounds. Musculoskeletal: warm without cyanosis. There is no lower leg edema.   Skin:  no jaundice or rashes,  Neurologic: no gross neuro deficits     Psychiatric:  alert and oriented x 3    CHEST XRAY:         Chest CT:     LAB  Recent Labs      04/07/17   0728  04/07/17   0631  04/06/17   2151  04/06/17   1727  04/06/17   1115   GLUCPOC  113*  65  85  178*  112*      Recent Labs      04/07/17   0534  04/05/17   1530  04/05/17   0336  04/04/17   1847  04/04/17   1214   WBC  4.8  6.1  5.4   --   5.8   HGB  9.8*  11.1*  10.1*   --   10.8*   HCT  30.5*  34.1*  32.1*   --   34.2*   PLT  158  177  153   --   169   INR   --    --    --   1.3*   --      Recent Labs      04/07/17   0534  04/05/17   0336  04/04/17   1214   NA  144  145  144   K  3.8  3.7  4.1   CL  107  109*  108*   CO2  24  26  30   GLU  59*  98  133*   BUN  31*  22  21   CREA  2.37*  1.29*  1.21*   MG  1.7*   --   1.6*   CA  8.4  8.0*  8.3   PHOS   --    --   3.1   ALB   --    --   3.8   TBILI   --    --   0.6   ALT   --    --   37   SGOT   --    -- 41*     No results for input(s): PH, PCO2, PO2, HCO3 in the last 72 hours. No results for input(s): LCAD, LAC in the last 72 hours. Assessment:  (Medical Decision Making)     Hospital Problems  Date Reviewed: 4/7/2017          Codes Class Noted POA    * (Principal)PE (pulmonary thromboembolism) (HCC)-on xarelto ICD-10-CM: I26.99  ICD-9-CM: 415.19  4/4/2017 Yes        Atrial fibrillation (HCC)-on amio gtt, continue BB ICD-10-CM: I48.91  ICD-9-CM: 427.31  4/4/2017 Yes        Pleural effusion-diuresed, still up >3L since admission, holding diuresis secondary to renal failure  ICD-10-CM: J90  ICD-9-CM: 511.9  4/4/2017 Yes        Debility-chronic  ICD-10-CM: R53.81  ICD-9-CM: 799.3  4/4/2017 Yes        Hypoxemia-wean O2 as tolerated  ICD-10-CM: R09.02  ICD-9-CM: 799.02  4/4/2017 Unknown        Asthma-continue nebs ICD-10-CM: J45.909  ICD-9-CM: 493.90  7/1/2015 Yes              Plan:  (Medical Decision Making)     --wean O2 as tolerated  --continue xarelto  --replete mag and recheck tomorrow  --on amio gtt, HR needs better control before discharge  --appreciate cardiology and palliative care input  --need to discuss discharge planning with social work    More than 50% of the time documented was spent in face-to-face contact with the patient and in the care of the patient on the floor/unit where the patient is located. JESUS [place]          Lungs:  clear  Heart:  RRR with no Murmur/Rubs/Gallops    Additional Comments: On amiodarone gtt, needs better control before discharge     I have spoken with and examined the patient. I agree with the above assessment and plan as documented.     Liban Moulton MD

## 2017-04-07 NOTE — PROGRESS NOTES
OT Note: Patient is sleeping after an active morning per RN. Plan to check back later as schedule permits.

## 2017-04-07 NOTE — PROGRESS NOTES
IDALIA spoke with pt's daughter and g'je today. They have reconsidered dc plan and want to pursue the rehab option. PPD placed today. Family will review the SNF list and get back with this writer re rehab choices. Pt will need insurance precert prior to dc.

## 2017-04-07 NOTE — PROGRESS NOTES
Palliative Care Progress Note    Patient: Berna Holstein MRN: [id number]  SSN: [id number]    YOB: 1926  Age: 80 y.o. Sex: female       Assessment/Plan:     Chief Complaint/Interval History: Debilitated     Principal Diagnosis:    · Debility, Unspecified  R53.81    Additional Diagnoses:   · Cough  R05  · Frailty  R54  · Counseling, Encounter for Medical Advice  Z71.9  · Encounter for Palliative Care  Z51.5    Palliative Performance Scale (PPS)  PPS: 60    Medical Decision Making:   Reviewed and summarized notes over previous 24 hours. Discussed case with appropriate providers. Reviewed laboratory and x-ray data: CBC, CMP     Patient resting in bed, no family present. Patient's only complaints today are dyspnea and nasal congestion. Dyspnea improved after breathing treatment. Nasal spray provided for nasal congestion. Reviewed notes, previous discussions, and patient/family's decision for STR. DNR is established. Patient and family clear that thay want ongoing medical care. Goals and plan are established, will sign off. Please re-consult if needs arise. Thank you for the opportunity to participate in Ms. Rose's care. Will discuss findings with members of the interdisciplinary team.         More than 50% of this 15 minute visit was spent counseling and coordination of care as outlined above. Subjective:     Review of Systems:  A comprehensive review of systems was negative except for:   Constitutional: Positive for fatigue  Respiratory: Positive for intermittent dyspnea and constant nasal congestion.       Objective:     Visit Vitals    BP 96/65 (BP 1 Location: Right arm, BP Patient Position: At rest)    Pulse (!) 113    Temp 97.5 °F (36.4 °C)    Resp 18    Ht 5' 1\" (1.549 m)    Wt 68.5 kg (151 lb 1.6 oz)    SpO2 99%    Breastfeeding No    BMI 28.55 kg/m2       Physical Exam:    General:  Frail, debilitated   Eyes:  Conjunctivae/corneas clear    Nose: Nares normal. Septum midline. O2 via NC.    Neck: Supple, symmetrical, trachea midline, no JVD   Lungs:   Faint expiratory wheezing   Heart:  Tachycardic, rate 109   Abdomen:    Soft, non-tender, non-distended   Extremities: Normal, atraumatic, no cyanosis    Skin: Fragile, scattered ecchymosis   Neurologic: Nonfocal   Psych: Alert and oriented     Signed By: So Ragsdale NP     April 7, 2017

## 2017-04-07 NOTE — PROGRESS NOTES
Bedside and Verbal shift change report given to self (oncoming nurse) by Claudell Rucks, RN (offgoing nurse). Report included the following information SBAR, Kardex, MAR and Recent Results.  Amio gtt visualized with offgoing rn

## 2017-04-07 NOTE — PROGRESS NOTES
Bedside and Verbal shift change report given to Isaac Berrios RN (oncoming nurse) by self Alvarez Jean Baptiste nurse). Report included the following information SBAR, Kardex, MAR and Recent Results.  Amio gtt with filter visualized with oncoming RN

## 2017-04-08 LAB
ANION GAP BLD CALC-SCNC: 14 MMOL/L (ref 7–16)
BUN SERPL-MCNC: 35 MG/DL (ref 8–23)
CALCIUM SERPL-MCNC: 8.7 MG/DL (ref 8.3–10.4)
CHLORIDE SERPL-SCNC: 106 MMOL/L (ref 98–107)
CO2 SERPL-SCNC: 21 MMOL/L (ref 21–32)
CREAT SERPL-MCNC: 2.72 MG/DL (ref 0.6–1)
ERYTHROCYTE [DISTWIDTH] IN BLOOD BY AUTOMATED COUNT: 14.9 % (ref 11.9–14.6)
GLUCOSE BLD STRIP.AUTO-MCNC: 130 MG/DL (ref 65–100)
GLUCOSE BLD STRIP.AUTO-MCNC: 137 MG/DL (ref 65–100)
GLUCOSE BLD STRIP.AUTO-MCNC: 148 MG/DL (ref 65–100)
GLUCOSE BLD STRIP.AUTO-MCNC: 165 MG/DL (ref 65–100)
GLUCOSE SERPL-MCNC: 136 MG/DL (ref 65–100)
HCT VFR BLD AUTO: 34.1 % (ref 35.8–46.3)
HGB BLD-MCNC: 10.8 G/DL (ref 11.7–15.4)
INR PPP: 1.8 (ref 0.9–1.2)
MAGNESIUM SERPL-MCNC: 2.2 MG/DL (ref 1.8–2.4)
MCH RBC QN AUTO: 32 PG (ref 26.1–32.9)
MCHC RBC AUTO-ENTMCNC: 31.7 G/DL (ref 31.4–35)
MCV RBC AUTO: 101.2 FL (ref 79.6–97.8)
MM INDURATION POC: 0 MM (ref 0–5)
PLATELET # BLD AUTO: 175 K/UL (ref 150–450)
PMV BLD AUTO: 10 FL (ref 10.8–14.1)
POTASSIUM SERPL-SCNC: 4.6 MMOL/L (ref 3.5–5.1)
PPD POC: NEGATIVE NEGATIVE
PROTHROMBIN TIME: 19.8 SEC (ref 9.6–12)
RBC # BLD AUTO: 3.37 M/UL (ref 4.05–5.25)
SODIUM SERPL-SCNC: 141 MMOL/L (ref 136–145)
WBC # BLD AUTO: 5.8 K/UL (ref 4.3–11.1)

## 2017-04-08 PROCEDURE — 85730 THROMBOPLASTIN TIME PARTIAL: CPT | Performed by: INTERNAL MEDICINE

## 2017-04-08 PROCEDURE — 74011636637 HC RX REV CODE- 636/637: Performed by: INTERNAL MEDICINE

## 2017-04-08 PROCEDURE — 94760 N-INVAS EAR/PLS OXIMETRY 1: CPT

## 2017-04-08 PROCEDURE — 74011250637 HC RX REV CODE- 250/637: Performed by: INTERNAL MEDICINE

## 2017-04-08 PROCEDURE — 74011250637 HC RX REV CODE- 250/637: Performed by: NURSE PRACTITIONER

## 2017-04-08 PROCEDURE — 82962 GLUCOSE BLOOD TEST: CPT

## 2017-04-08 PROCEDURE — 97530 THERAPEUTIC ACTIVITIES: CPT

## 2017-04-08 PROCEDURE — 36415 COLL VENOUS BLD VENIPUNCTURE: CPT | Performed by: INTERNAL MEDICINE

## 2017-04-08 PROCEDURE — 74011250637 HC RX REV CODE- 250/637: Performed by: PHYSICIAN ASSISTANT

## 2017-04-08 PROCEDURE — 94640 AIRWAY INHALATION TREATMENT: CPT

## 2017-04-08 PROCEDURE — 74011250636 HC RX REV CODE- 250/636: Performed by: INTERNAL MEDICINE

## 2017-04-08 PROCEDURE — 85027 COMPLETE CBC AUTOMATED: CPT | Performed by: INTERNAL MEDICINE

## 2017-04-08 PROCEDURE — 80048 BASIC METABOLIC PNL TOTAL CA: CPT | Performed by: INTERNAL MEDICINE

## 2017-04-08 PROCEDURE — 74011250636 HC RX REV CODE- 250/636: Performed by: PHYSICIAN ASSISTANT

## 2017-04-08 PROCEDURE — 85610 PROTHROMBIN TIME: CPT | Performed by: PHYSICIAN ASSISTANT

## 2017-04-08 PROCEDURE — 74011000250 HC RX REV CODE- 250: Performed by: INTERNAL MEDICINE

## 2017-04-08 PROCEDURE — 99232 SBSQ HOSP IP/OBS MODERATE 35: CPT | Performed by: INTERNAL MEDICINE

## 2017-04-08 PROCEDURE — 77010033678 HC OXYGEN DAILY

## 2017-04-08 PROCEDURE — 83735 ASSAY OF MAGNESIUM: CPT | Performed by: INTERNAL MEDICINE

## 2017-04-08 PROCEDURE — 65660000000 HC RM CCU STEPDOWN

## 2017-04-08 PROCEDURE — 97535 SELF CARE MNGMENT TRAINING: CPT

## 2017-04-08 RX ORDER — METOPROLOL TARTRATE 25 MG/1
12.5 TABLET, FILM COATED ORAL EVERY 6 HOURS
Status: DISCONTINUED | OUTPATIENT
Start: 2017-04-08 | End: 2017-04-09

## 2017-04-08 RX ORDER — HEPARIN SODIUM 5000 [USP'U]/100ML
18-36 INJECTION, SOLUTION INTRAVENOUS
Status: DISCONTINUED | OUTPATIENT
Start: 2017-04-08 | End: 2017-04-11

## 2017-04-08 RX ORDER — AMIODARONE HYDROCHLORIDE 200 MG/1
400 TABLET ORAL EVERY 12 HOURS
Status: DISCONTINUED | OUTPATIENT
Start: 2017-04-08 | End: 2017-04-11

## 2017-04-08 RX ORDER — AMIODARONE HYDROCHLORIDE 200 MG/1
400 TABLET ORAL 2 TIMES DAILY
Status: DISCONTINUED | OUTPATIENT
Start: 2017-04-08 | End: 2017-04-08

## 2017-04-08 RX ORDER — WARFARIN SODIUM 5 MG/1
5 TABLET ORAL
Status: DISCONTINUED | OUTPATIENT
Start: 2017-04-08 | End: 2017-04-11

## 2017-04-08 RX ORDER — WARFARIN SODIUM 5 MG/1
5 TABLET ORAL EVERY EVENING
Status: DISCONTINUED | OUTPATIENT
Start: 2017-04-08 | End: 2017-04-08

## 2017-04-08 RX ORDER — HEPARIN SODIUM 5000 [USP'U]/ML
5000 INJECTION, SOLUTION INTRAVENOUS; SUBCUTANEOUS ONCE
Status: COMPLETED | OUTPATIENT
Start: 2017-04-08 | End: 2017-04-08

## 2017-04-08 RX ADMIN — HEPARIN SODIUM 5000 UNITS: 5000 INJECTION, SOLUTION INTRAVENOUS; SUBCUTANEOUS at 12:02

## 2017-04-08 RX ADMIN — ATORVASTATIN CALCIUM 40 MG: 40 TABLET, FILM COATED ORAL at 22:23

## 2017-04-08 RX ADMIN — PANTOPRAZOLE SODIUM 40 MG: 40 TABLET, DELAYED RELEASE ORAL at 06:23

## 2017-04-08 RX ADMIN — WARFARIN SODIUM 5 MG: 5 TABLET ORAL at 22:23

## 2017-04-08 RX ADMIN — AMIODARONE HYDROCHLORIDE 1 MG/MIN: 50 INJECTION, SOLUTION INTRAVENOUS at 01:33

## 2017-04-08 RX ADMIN — LORATADINE 10 MG: 10 TABLET ORAL at 09:11

## 2017-04-08 RX ADMIN — LEVALBUTEROL HYDROCHLORIDE 0.63 MG: 0.63 SOLUTION RESPIRATORY (INHALATION) at 07:44

## 2017-04-08 RX ADMIN — LEVALBUTEROL HYDROCHLORIDE 0.63 MG: 0.63 SOLUTION RESPIRATORY (INHALATION) at 20:25

## 2017-04-08 RX ADMIN — LEVALBUTEROL HYDROCHLORIDE 0.63 MG: 0.63 SOLUTION RESPIRATORY (INHALATION) at 13:39

## 2017-04-08 RX ADMIN — METOPROLOL TARTRATE 12.5 MG: 25 TABLET ORAL at 06:00

## 2017-04-08 RX ADMIN — Medication 10 ML: at 22:23

## 2017-04-08 RX ADMIN — AMIODARONE HYDROCHLORIDE 400 MG: 200 TABLET ORAL at 11:53

## 2017-04-08 RX ADMIN — AMIODARONE HYDROCHLORIDE 400 MG: 200 TABLET ORAL at 22:23

## 2017-04-08 RX ADMIN — HUMAN INSULIN 2 UNITS: 100 INJECTION, SOLUTION SUBCUTANEOUS at 17:36

## 2017-04-08 RX ADMIN — NIACIN 500 MG: 500 TABLET, FILM COATED, EXTENDED RELEASE ORAL at 22:23

## 2017-04-08 RX ADMIN — LEVALBUTEROL HYDROCHLORIDE 0.63 MG: 0.63 SOLUTION RESPIRATORY (INHALATION) at 01:49

## 2017-04-08 RX ADMIN — ACETAMINOPHEN 650 MG: 325 TABLET, FILM COATED ORAL at 22:33

## 2017-04-08 RX ADMIN — HEPARIN SODIUM AND DEXTROSE 18 UNITS/KG/HR: 5000; 5 INJECTION INTRAVENOUS at 12:05

## 2017-04-08 NOTE — PROGRESS NOTES
Pankaj Amaya  Admission Date: 4/4/2017             Daily Progress Note: 4/8/2017    The patient's chart is reviewed and the patient is discussed with the staff. Pt is a 81 yo  female with a history of HTN, HLD, asthma, and DM2. Pt presented to the ER with afib with RVR. She was also found to have elevated ddimer and CTA chest revealed bibasilar pulmonary emboli, small bilateral pleural effusions, pulmonary edema, and large hiatal hernia. Pt initially on heparin and transitioned to PO xarelto. Pt remains on amio gtt with poor control of afib. Subjective:     HR difficult to control and BP marginal.  Currently on O2 at 2 lpm via NC with O2 st 96%. Occasional cough productive of moderate amounts of light colored mucus. Denies pain.      Current Facility-Administered Medications   Medication Dose Route Frequency    amiodarone (CORDARONE) tablet 400 mg  400 mg Oral BID    warfarin (COUMADIN) tablet 5 mg  5 mg Oral QPM    heparin (porcine) injection 5,000 Units  5,000 Units IntraVENous ONCE    heparin 25,000 units in dextrose 500 mL infusion  18-36 Units/kg/hr IntraVENous TITRATE    metoprolol tartrate (LOPRESSOR) tablet 12.5 mg  12.5 mg Oral Q6H    sodium chloride (OCEAN) 0.65 % nasal spray 1 Spray  1 Spray Both Nostrils PRN    atorvastatin (LIPITOR) tablet 40 mg  40 mg Oral QHS    loratadine (CLARITIN) tablet 10 mg  10 mg Oral DAILY    niacin ER (NIASPAN) tablet 500 mg  500 mg Oral QHS    pantoprazole (PROTONIX) tablet 40 mg  40 mg Oral ACB    sodium chloride (NS) flush 5-10 mL  5-10 mL IntraVENous Q8H    sodium chloride (NS) flush 5-10 mL  5-10 mL IntraVENous PRN    LORazepam (ATIVAN) injection 1 mg  1 mg IntraVENous Q6H PRN    levalbuterol (XOPENEX) nebulizer soln 0.63 mg/3 mL  0.63 mg Nebulization Q6H RT    insulin regular (NOVOLIN R, HUMULIN R) injection   SubCUTAneous AC&HS    acetaminophen (TYLENOL) tablet 650 mg  650 mg Oral Q6H PRN       Review of Systems  Constitutional: negative for fever, chills, sweats  Cardiovascular: negative for chest pain, palpitations, syncope, edema  Gastrointestinal:  negative for dysphagia, reflux, vomiting, diarrhea, abdominal pain, or melena  Neurologic:  negative for focal weakness, numbness, headache    Objective:     Vitals:    04/08/17 0501 04/08/17 0600 04/08/17 0744 04/08/17 0950   BP: 116/61 114/70  102/65   Pulse: (!) 102 (!) 103  98   Resp: 18   18   Temp: 97 °F (36.1 °C)   97.4 °F (36.3 °C)   SpO2: 98%  98% 96%   Weight: 155 lb 1.6 oz (70.4 kg)      Height:         Intake and Output:   04/06 1901 - 04/08 0700  In: 1436.2 [P.O.:970; I.V.:466.2]  Out: 751 [Urine:750]       Physical Exam:   Constitution:  the patient is well developed and in no acute distress  EENMT:  Sclera clear, pupils equal, oral mucosa moist  Respiratory: diminished but CTA bilaterally, o2 at 2 lpm  Cardiovascular:  RRR without M,G,R  Gastrointestinal: soft and non-tender; with positive bowel sounds. Musculoskeletal: warm without cyanosis. There is no lower leg edema.   Skin:  no jaundice or rashes, no open wounds   Neurologic: no gross neuro deficits     Psychiatric:  alert and oriented x 3    CHEST XRAY:   4/4          LAB  Recent Labs      04/08/17   1110  04/08/17   0616  04/07/17   2155  04/07/17   1708  04/07/17   1242   GLUCPOC  137*  130*  161*  146*  169*      Recent Labs      04/08/17   0452  04/07/17   0534  04/05/17   1530   WBC  5.8  4.8  6.1   HGB  10.8*  9.8*  11.1*   HCT  34.1*  30.5*  34.1*   PLT  175  158  177     Recent Labs      04/08/17   0452  04/07/17   0534   NA  141  144   K  4.6  3.8   CL  106  107   CO2  21  24   GLU  136*  59*   BUN  35*  31*   CREA  2.72*  2.37*   MG  2.2  1.7*   CA  8.7  8.4       Assessment:  (Medical Decision Making)     Hospital Problems  Date Reviewed: 4/8/2017          Codes Class Noted POA    Hypomagnesemia ICD-10-CM: P04.05  ICD-9-CM: 275.2  4/7/2017 Unknown    Most recent Mg++ 2.2      Acute renal failure (ARF) (HCC) ICD-10-CM: N17.9  ICD-9-CM: 584.9  4/7/2017 Unknown    Creat 2.72      Do not resuscitate (Chronic) ICD-10-CM: Z66  ICD-9-CM: V49.86 Chronic 4/7/2017 Unknown        * (Principal)PE (pulmonary thromboembolism) ([facility] [address] ICD-10-CM: I26.99  ICD-9-CM: 415.19  4/4/2017 Yes    xarelto changed to coumadin / heparin gtt secondary to renal failure      Atrial fibrillation ([facility] ICD-10-CM: I48.91  ICD-9-CM: 427.31  4/4/2017 Yes    Rate control remains an issues - especially with ongoing hypotension      Pleural effusion ICD-10-CM: J90  ICD-9-CM: 511.9  4/4/2017 Yes    Minimal      Debility ICD-10-CM: R53.81  ICD-9-CM: 799.3  4/4/2017 Yes    Plan for discharge to rehab when medically stable      Hypoxemia ICD-10-CM: R09.02  ICD-9-CM: 799.02  4/4/2017 Unknown        Asthma ICD-10-CM: J45.909  ICD-9-CM: 493.90  7/1/2015 Yes    No active wheezing       BS range 130-169  SSI    Plan:  (Medical Decision Making)     --Xopenex >>> heparin / coumadin - changed secondary to ARF  --amio / lopressor  --Palliative Care following - Patient is DNR  --SW following - plan for rehab when medically stable for discharge    More than 50% of the time documented was spent in face-to-face contact with the patient and in the care of the patient on the floor/unit where the patient is located. Wellington Jennings NP     Lungs:  Fairly clear  Heart:  iRR with no Murmur/Rubs/Gallops    Additional Comments: Will take time for adequate warfarin anticoagulation. I have spoken with and examined the patient. I agree with the above assessment and plan as documented.     Brando Box MD

## 2017-04-08 NOTE — PROGRESS NOTES
[facility] CARDIOLOGY PROGRESS NOTE           4/8/2017 7:44 AM    Admit Date: 4/4/2017      Subjective:   Pt reports feeling OK this AM without much SOB, wearing O2, denies pain. BP up to 112/69 this AM,     ROS:  Cardiovascular:  As noted above    Objective:      Vitals:    04/08/17 0149 04/08/17 0501 04/08/17 0600 04/08/17 0744   BP: 110/59 116/61 114/70    Pulse: 96 (!) 102 (!) 103    Resp: 19 18     Temp: 98.3 °F (36.8 °C) 97 °F (36.1 °C)     SpO2: 96% 98%  98%   Weight:  70.4 kg (155 lb 1.6 oz)     Height:           Physical Exam:  General-No Acute Distress  Neck- supple, no JVD  CV- irregular rapid rate and rhythm  Lung- diminished BS bilaterally  Abd- soft, nontender, nondistended  Ext- no edema bilaterally. Skin- warm and dry    Data Review:   Recent Labs      04/08/17   0452  04/07/17   0534   NA   --   144   K   --   3.8   MG   --   1.7*   BUN   --   31*   CREA   --   2.37*   GLU   --   59*   WBC  5.8  4.8   HGB  10.8*  9.8*   HCT  34.1*  30.5*   PLT  175  158       Assessment/Plan:     Principal Problem:    PE (p[facility] throm[facility]embolism) ([facility] (4/4/2017)- Xarelto per pulmonary    Active Problems:    Asthma (7/1/2015)      Atrial fibrillation ([facility] [address] (4/4/2017)- stopped Lisinopril and HCTZ for increased BP and ability to add BB, start Lopressor 12.5 mg q 6 h for better HR control, monitor      Pleural effusion (4/4/2017) The current medical regimen is effective;  continue present plan and medications.         Debility (4/4/2017)      Hypoxemia (4/4/2017)- on O2 per pulmonary      Ijeoma Jaeger MD  4/8/2017 7:44 AM

## 2017-04-08 NOTE — PROGRESS NOTES
Problem: Self Care Deficits Care Plan (Adult)  Goal: *Acute Goals and Plan of Care (Insert Text)  1. Patient will complete lower body bathing and dressing with minimal assistance and adaptive equipment as needed. 2. Patient will complete toileting with minimal assistance. 3. Patient will tolerate 25 minutes of OT treatment with 1-2 rest breaks to increase activity tolerance for ADLs. 4. Patient will complete functional transfers with supervision and adaptive equipment as needed. 5. Patient will complete functional mobility with minimal cues for safety and minimal assistance to complete functional mobility. Timeframe: 7 visits       OCCUPATIONAL THERAPY: Daily Note, Treatment Day: 1st and PM 4/8/2017  INPATIENT: Hospital Day: 5  Payor: [employer] PLUS / [place] [employer] [place] / Product Type: Managed Care Medicare /      NAME/AGE/GENDER: Destinee Styles is a 80 y.o. female  PRIMARY DIAGNOSIS:  PE (pulmonary thromboembolism) ([address] PE (pulmonary thromboembolism) ([address] PE (pulmonary thromboembolism) ([address]        ICD-10: Treatment Diagnosis:        · Generalized Muscle Weakness (M62.81)  · Other lack of cordination (R27.8)  · History of falling (Z91.81)  · Abnormal posture (R29.3)   Precautions/Allergies:         Review of patient's allergies indicates no known allergies. ASSESSMENT:      Ms. My Sanchez presents to the hospital with PE. Pt had just returned to supine in bed upon arrival with supportive daughter at bedside. Had sat up most of the morning after working with PT. Pt is very pleasant and agreeable to working with therapy with minimal encouragement. Pt hard of hearing at times which limits command following. Pt asking for help going to the [facility]. Pt up sat to the edge of bed with SBA. Pt stood at edge of bed to the walker with cues for hand placement and needed safety cues for use of walker and with transfers.  Sit to stand with minimal assist and transferred to the [facility]. Min A with toileting after BM. OT gave wash cloth and pt washed hands. Returned to supine with minimal A. All needs in reach and dtr at bedside. Pt lives alone currently, but daughter states she is going to live with her at discharge. Pt noted to have some decreased activity tolerance and impaired safety that puts her at greater risk for falls. Pt could potentially benefit from some rehab at discharge. Pt will benefit from OT services to address stated goals and plan of care. This section established at most recent assessment   PROBLEM LIST (Impairments causing functional limitations):  1. Decreased Strength  2. Decreased ADL/Functional Activities  3. Decreased Transfer Abilities  4. Decreased Ambulation Ability/Technique  5. Decreased Balance  6. Decreased Activity Tolerance  7. Increased Shortness of Breath  8. Decreased Flexibility/Joint Mobility  9. Decreased False Pass with Home Exercise Program  10. Decreased Cognition    INTERVENTIONS PLANNED: (Benefits and precautions of occupational therapy have been discussed with the patient.)  1. Activities of daily living training  2. Adaptive equipment training  3. Balance training  4. Clothing management  5. Cognitive training  6. Donning&doffing training  7. Group therapy  8. Neuromuscular re-eduation  9. Theraputic activity  10. Theraputic exercise      TREATMENT PLAN: Frequency/Duration: Follow patient 3 times per week to address above goals. Rehabilitation Potential For Stated Goals: GOOD      RECOMMENDED REHABILITATION/EQUIPMENT: (at time of discharge pending progress): Continue Skilled Therapy and Rehab. OCCUPATIONAL PROFILE AND HISTORY:   History of Present Injury/Illness (Reason for Referral):  See H&P  Past Medical History/Comorbidities:   Ms. Tyrese Bell  has a past medical history of Asthma (7/1/2015); Heartburn (7/1/2015); Hyperlipidemia (7/1/2015); Hypertension (7/1/2015);  Type 2 diabetes mellitus ([facility] [address] and Urge incontinence (7/1/2015). Ms. Raúl Jacob  has a past surgical history that includes cataract removal (2011). Social History/Living Environment:   Home Environment: Private residence  # Steps to Enter: 3  One/Two Story Residence: One story  Living Alone:  (moving in with daughter)  Support Systems: Family member(s)  Patient Expects to be Discharged to[de-identified] Private residence  Current DME Used/Available at Home: Cane, quad, Grab bars  Tub or Shower Type: Tub/Shower combination  Prior Level of Function/Work/Activity:  Pt was living alone and completing functional mobility with a quad cane. Pt reports she was completing ADL without assistance. Pt was still completing her own cooking/cleaning as well. Pt reports recent fall out of chair. Personal Factors:          Social Background:  Living alone previously. Hx of falls. Overall Behavior:  Pt hard of hearing with decreased safety awareness in standing/functional mobility/functional transfers        Other factors that influence how disability is experienced by the patient:  Hard of hearing   Number of Personal Factors/Comorbidities that affect the Plan of Care: Extensive review of physical, cognitive, and psychosocial performance (3+):  HIGH COMPLEXITY   ASSESSMENT OF OCCUPATIONAL PERFORMANCE[de-identified]   Activities of Daily Living:           Basic ADLs (From Assessment) Complex ADLs (From Assessment)   Basic ADL  Feeding: Stand-by assistance  Oral Facial Hygiene/Grooming: Moderate assistance  Bathing: Moderate assistance  Upper Body Dressing: Moderate assistance  Lower Body Dressing: Maximum assistance  Toileting:  Moderate assistance Instrumental ADL  Meal Preparation: Maximum assistance  Homemaking: Maximum assistance   Grooming/Bathing/Dressing Activities of Daily Living   Grooming  Washing Hands: Supervision/set-up Cognitive Retraining  Orientation Retraining: Reorienting  Safety/Judgement: Decreased awareness of environment;Decreased awareness of need for assistance;Decreased awareness of need for safety;Decreased insight into deficits; Fall prevention     Feeding  Drink to Mouth: Supervision/set-up     Toileting  Toileting Assistance: Minimum assistance  Bladder Hygiene: Minimum assistance  Bowel Hygiene: Minimum assistance  Clothing Management: Minimum assistance  Cues: Physical assistance for pants down;Physical assistance for pants up; Tactile cues provided;Verbal cues provided;Visual cues provided  Adaptive Equipment: Elevated seat     Functional Transfers  Bathroom Mobility: Minimum assistance  Toilet Transfer : Minimum assistance     Bed/Mat Mobility  Rolling: Minimum assistance  Supine to Sit: Minimum assistance  Sit to Supine: Minimum assistance  Sit to Stand: Minimum assistance  Bed to Chair: Minimum assistance  Scooting: Minimum assistance          Most Recent Physical Functioning:   Gross Assessment:                  Posture:  Posture (WDL): Exceptions to WDL  Posture Assessment:  Forward head, Kyphosis, Rounded shoulders  Balance:  Sitting: Impaired  Sitting - Static: Fair (occasional)  Sitting - Dynamic: Fair (occasional)  Standing: Impaired  Standing - Static: Fair  Standing - Dynamic : Fair Bed Mobility:  Rolling: Minimum assistance  Supine to Sit: Minimum assistance  Sit to Supine: Minimum assistance  Scooting: Minimum assistance  Wheelchair Mobility:     Transfers:  Sit to Stand: Minimum assistance  Stand to Sit: Minimum assistance  Bed to Chair: Minimum assistance                 Patient Vitals for the past 6 hrs:   BP BP Patient Position SpO2 O2 Flow Rate (L/min) Pulse   04/08/17 0950 102/65 At rest 96 % - 98   04/08/17 1130 (!) 83/57 - - - -   04/08/17 1155 (!) 82/60 - - - (!) 102   04/08/17 1301 91/61 At rest 100 % - (!) 104   04/08/17 1341 - - 95 % 2 l/min -        Mental Status  Neurologic State: Alert  Orientation Level: Appropriate for age, Oriented X4  Cognition: Follows commands  Perception: Appears intact  Perseveration: No perseveration noted  Safety/Judgement: Decreased awareness of environment, Decreased awareness of need for assistance, Decreased awareness of need for safety, Decreased insight into deficits, Fall prevention                               Physical Skills Involved:  1. Balance  2. Mobility  3. Strength  4. Endurance Cognitive Skills Affected (resulting in the inability to perform in a timely and safe manner):  1. Attending  2. Perceiving  3. Understanding  4. Problem Solving  5. Mental Sequencing Psychosocial Skills Affected:  1. Habits  2. Routines and Behaviors  3. Environmental Adaptations   Number of elements that affect the Plan of Care: 5+:  HIGH COMPLEXITY   CLINICAL DECISION MAKIN [facility] Mobility Inpatient Short Form  How much help from another person does the patient currently need. .. Total A Lot A Little None   1. Putting on and taking off regular lower body clothing?   [ ] 1   [X] 2   [ ] 3   [ ] 4   2. Bathing (including washing, rinsing, drying)? [ ] 1   [X] 2   [ ] 3   [ ] 4   3. Toileting, which includes using toilet, bedpan or urinal?   [ ] 1   [X] 2   [ ] 3   [ ] 4   4. Putting on and taking off regular upper body clothing?   [ ] 1   [X] 2   [ ] 3   [ ] 4   5. Taking care of personal grooming such as brushing teeth? [ ] 1   [X] 2   [ ] 3   [ ] 4   6. Eating meals? [ ] 1   [ ] 2   [X] 3   [ ] 4   © , Trustees of [address] [place] [address], under license to [employer]. All rights reserved    Score:  Initial: 13 Most Recent: X (Date: -- )     Interpretation of Tool:  Represents activities that are increasingly more difficult (i.e. Bed mobility, Transfers, Gait).        Score 24 23 22-20 19-15 14-10 9-7 6       Modifier CH CI CJ CK CL CM CN         · Self Care:               - CURRENT STATUS:    CL - 60%-79% impaired, limited or restricted               - GOAL STATUS:           CK - 40%-59% impaired, limited or restricted               - D/C STATUS: ---------------To be determined---------------  Payor: [employer]US / [place] [employer] [place] / Product Type: Managed Care Medicare /       Medical Necessity:     · Patient demonstrates good rehab potential due to higher previous functional level. Reason for Services/Other Comments:  · Patient continues to require skilled intervention due to decreased safety and increased risk for falls with ADL/functional mobility. Use of outcome tool(s) and clinical judgement create a POC that gives a: MODERATE COMPLEXITY             TREATMENT:   (In addition to Assessment/Re-Assessment sessions the following treatments were rendered)      Pre-treatment Symptoms/Complaints:  Reports being tired and wanting to sleep more today. Reports breathing better. Pain: Initial:   Pain Intensity 1: 0 Post Session:  same      Self Care: (30 mins): Procedure(s) (per grid) utilized to improve and/or restore self-care/home management as related to toileting, grooming and self feeding. Required minimal visual, verbal, tactile and   cueing to facilitate activities of daily living skills and compensatory activities. Braces/Orthotics/Lines/Etc:   · IV  · O2 Device: Nasal cannula  Treatment/Session Assessment:    · Response to Treatment:  Tolerated well. · Interdisciplinary Collaboration:  · Occupational Therapist  · Registered Nurse  · After treatment position/precautions:  · Supine in bed, Bed alarm/tab alert on, Bed/Chair-wheels locked, Bed in low position, Call light within reach, RN notified, Family at bedside and Side rails x 3  · Compliance with Program/Exercises: Compliant today. · Recommendations/Intent for next treatment session:  Next visit will focus on advancements to more challenging activities and reduction in assistance provided.   Total Treatment Duration: 30 mins  OT Patient Time In/Time Out  Time In: 1330  Time Out: 1400     JAKE Pretty, MS, OTR/L

## 2017-04-08 NOTE — PROGRESS NOTES
Problem: Mobility Impaired (Adult and Pediatric)  Goal: *Acute Goals and Plan of Care (Insert Text)  LTG:  (1.)Ms. Neyda Robles will move from supine to sit and sit to supine , scoot up and down and roll side to side in bed with STAND BY ASSIST within 7 day(s). (2.)Ms. Neyda Robles will transfer from bed to chair and chair to bed with STAND BY ASSIST using the least restrictive device within 7 day(s). (3.)Ms. Neyda Robles will ambulate with CONTACT GUARD ASSIST for 250 feet with the least restrictive device within 7 day(s). (4.)Ms. Neyda Robles will negotiate up/down 2 steps with with CONTACT GUARD ASSIST within 7 days. ________________________________________________________________________________________________      PHYSICAL THERAPY: Daily Note, Treatment Day: 2nd and PM 4/8/2017  INPATIENT: Hospital Day: 5  Payor: FIRST CHO[employer] VIP CARE PLUS / [place] FIRST CHO[employer] VIP CARE PLUS / Product Type: Managed Care Medicare /      NAME/AGE/GENDER: Autumn Buckley is a 80 y.o. female  PRIMARY DIAGNOSIS: PE (pulmonary thromboembolism) ([facility] [address] PE (pulmonary thromboembolism) ([facility] [address] [facility] PE (pulmonary thromboembolism) ([facility] [address] [facility]        ICD-10: Treatment Diagnosis:       · Generalized Muscle Weakness (M62.81)  · Other lack of cordination (R27.8)  · Difficulty in walking, Not elsewhere classified (R26.2)   Precaution/Allergies:  Review of patient's allergies indicates no known allergies. ASSESSMENT:      Ms. Neyda Rolbes presents initially with resting blood pressure 74/51 and HR 81. Nurse informed then PT leaves and returns after 30 mins with improved blood pressure to 83/57. Pt agreeable to ambulate with PT. She demonstrates bed mobility over to the side of the bed with minimal assistance x 1 and no complaints of dizziness. Per blood pressure check, her blood pressure remains stable. Then she was able to stand with minimal assistance x 1 with ambulation for 7 feet over to the bedside chair and seated with daughter present in bedside chair. Pt demo forward flexed posture, required cues for walker safety. Pt with cuing to decrease speed in order to increase safety with ambulation and energy conversation. Pt. required MIN A x 1 for increased safety, conts to be a fall risk at this time, family planning to go to pain management. PT to cont to follow for acute care needs, pt would benefit from rehab at discharge. This section established at most recent assessment   PROBLEM LIST (Impairments causing functional limitations):  1. Decreased Strength  2. Decreased ADL/Functional Activities  3. Decreased Transfer Abilities  4. Decreased Ambulation Ability/Technique  5. Decreased Balance  6. Decreased Activity Tolerance  7. Increased Fatigue  8. Increased Shortness of Breath    INTERVENTIONS PLANNED: (Benefits and precautions of physical therapy have been discussed with the patient.)  1. Balance Exercise  2. Bed Mobility  3. Family Education  4. Gait Training  5. Therapeutic Activites  6. Therapeutic Exercise/Strengthening  7. Transfer Training  8. Group Therapy      TREATMENT PLAN: Frequency/Duration: 3 times a week for duration of hospital stay  Rehabilitation Potential For Stated Goals: FAIR      RECOMMENDED REHABILITATION/EQUIPMENT: (at time of discharge pending progress): Continue Skilled Therapy and Rehab. HISTORY:   History of Present Injury/Illness (Reason for Referral):  Per H&P, \"Patient is a 80 y.o.  female presents with SOB and R shoulder pain. She went to see her PCP yesterday and she was told she had something wrong with her heart and she was place don lasix. She has LE edema. She denies chest pain. She presented with A fib qith RVR and also PE. I was asked to admit her. \"  Past Medical History/Comorbidities:   Ms. Harish Yung  has a past medical history of Asthma (7/1/2015); Heartburn (7/1/2015); Hyperlipidemia (7/1/2015); Hypertension (7/1/2015); Type 2 diabetes mellitus ([facility] [address] and Urge incontinence (7/1/2015).   Ms. Harish Yung has a past surgical history that includes cataract removal (2011). Social History/Living Environment:   Home Environment: Private residence  # Steps to Enter: 3  One/Two Story Residence: One story  Living Alone:  (moving in with daughter)  Support Systems: Family member(s)  Patient Expects to be Discharged to[de-identified] Private residence  Current DME Used/Available at Home: Cane, quad, Grab bars  Tub or Shower Type: Tub/Shower combination  Prior Level of Function/Work/Activity:  Independent. Some assistance with mobility. Uses cane. Number of Personal Factors/Comorbidities that affect the Plan of Care:  Age  Decreased insight into deficits  Unsteady on feet  SOB/hx of PE 3+: HIGH COMPLEXITY   EXAMINATION:   Most Recent Physical Functioning:   Gross Assessment:                  Posture:     Balance:  Sitting: Impaired  Sitting - Static: Fair (occasional)  Sitting - Dynamic: Fair (occasional)  Standing: Impaired  Standing - Static: Fair  Standing - Dynamic : Fair Bed Mobility:  Rolling: Minimum assistance  Supine to Sit: Minimum assistance  Sit to Supine: Minimum assistance  Scooting: Minimum assistance  Wheelchair Mobility:     Transfers:  Sit to Stand: Minimum assistance  Stand to Sit: Minimum assistance  Bed to Chair: Minimum assistance  Gait:     Base of Support: Center of gravity altered  Speed/[place]: Delayed; Fluctuations; Pace decreased (<100 feet/min); Shuffled; Slow  Step Length: Left shortened;Right shortened  Stance: Left decreased;Right decreased;Time;Weight shift  Gait Abnormalities: Decreased step clearance;Shuffling gait; Steppage gait; Step to gait;Trunk sway increased  Distance (ft): 7 Feet (ft) (no dizziness reported in sitting or standing with ambulation)  Assistive Device: Walker, rolling  Ambulation - Level of Assistance: Minimal assistance       Body Structures Involved:  1. Lungs Body Functions Affected:  1. Respiratory  2. Neuromusculoskeletal  3.  Movement Related Activities and Participation Affected:  1. General Tasks and Demands  2. Mobility  3. Self Care  4. Domestic Life  5. Community, Social and Enterprise [place]   Number of elements that affect the Plan of Care: 4+: HIGH COMPLEXITY   CLINICAL PRESENTATION:   Presentation: Stable and uncomplicated: LOW COMPLEXITY   CLINICAL DECISION MAKIN [facility] Mobility Inpatient Short Form  How much difficulty does the patient currently have. .. Unable A Lot A Little None   1. Turning over in bed (including adjusting bedclothes, sheets and blankets)? [ ] 1   [ ] 2   [ ] 3   [X] 4   2. Sitting down on and standing up from a chair with arms ( e.g., wheelchair, bedside commode, etc.)   [ ] 1   [ ] 2   [ ] 3   [X] 4   3. Moving from lying on back to sitting on the side of the bed? [ ] 1   [ ] 2   [ ] 3   [X] 4   How much help from another person does the patient currently need. .. Total A Lot A Little None   4. Moving to and from a bed to a chair (including a wheelchair)? [ ] 1   [X] 2   [ ] 3   [ ] 4   5. Need to walk in hospital room? [ ] 1   [X] 2   [ ] 3   [ ] 4   6. Climbing 3-5 steps with a railing? [X] 1   [ ] 2   [ ] 3   [ ] 4   © , Trustees of [address] [place] [address] [address] [address], under license to "[employer]. All rights reserved    Score:  Initial: 17 Most Recent: X (Date: 17 )     Interpretation of Tool:  Represents activities that are increasingly more difficult (i.e. Bed mobility, Transfers, Gait).        Score 24 23 22-20 19-15 14-10 9-7 6       Modifier CH CI CJ CK CL CM CN         · Mobility - Walking and Moving Around:               - CURRENT STATUS:    CK - 40%-59% impaired, limited or restricted               - GOAL STATUS:           CJ - 20%-39% impaired, limited or restricted               - D/C STATUS:                       ---------------To be determined---------------  Payor: FIRST CHO[employer] VIP CARE PLUS / [place] [employer] VIP CARE PLUS / Product Type: Managed Care Medicare / Medical Necessity:     · Patient is expected to demonstrate progress in strength, balance, coordination and functional technique to improve safety during bed mobility, transfers, and ambulation. Reason for Services/Other Comments:  · Patient continues to require present interventions due to patient's inability to safely transfer and ambulate with assistive device. Use of outcome tool(s) and clinical judgement create a POC that gives a: Questionable prediction of patient's progress: MODERATE COMPLEXITY                 TREATMENT:   (In addition to Assessment/Re-Assessment sessions the following treatments were rendered)   Pre-treatment Symptoms/Complaints:  Improving mobility,  Hypotensive but non symptomatic. Pain: Initial: 0/10 no specific pain . Post Session:  0/10 no specific pain today. Therapeutic Activity: (    26 min): Therapeutic activities including Ambulation on level ground, sit to stand transfers, walker safety to improve mobility, strength, balance and coordination. Required minimal   to promote static and dynamic balance in standing and promote coordination of bilateral, lower extremity(s). Braces/Orthotics/Lines/Etc:   · IV  · NC 2L  Treatment/Session Assessment:    · Response to Treatment:  Poor safety awareness, decreased balance  · Interdisciplinary Collaboration:  · Physical Therapist  · Registered Nurse  · After treatment position/precautions:  · Up in chair, Bed/Chair-wheels locked, Bed in low position, Call light within reach, Family at bedside and Side rails x 3  · Compliance with Program/Exercises: Will assess as treatment progresses. · Recommendations/Intent for next treatment session: \"Next visit will focus on advancements to more challenging activities and reduction in assistance provided\".   Total Treatment Duration:  PT Patient Time In/Time Out  Time In: 1104  Time Out: 1213 [address] [address] Anna Rodrigues

## 2017-04-08 NOTE — PROGRESS NOTES
Bedside and Verbal shift change report given to self (oncoming nurse) by Penelope Card RN (offgoing nurse). Report included the following information Kardex, MAR and Cardiac Rhythm NSR to ST,  rate 90 to 110. Shelvy Setting Daughter at bedside.

## 2017-04-08 NOTE — PROGRESS NOTES
Problem: Falls - Risk of  Goal: *Knowledge of fall prevention  Outcome: Progressing Towards Goal  Gripper socks on. Bed alarm on. Call bell in reach. Patient instructed to call for assistance; verbalized understanding.

## 2017-04-09 PROBLEM — R10.9 ABDOMINAL PAIN: Status: ACTIVE | Noted: 2017-04-09

## 2017-04-09 LAB
ANION GAP BLD CALC-SCNC: 10 MMOL/L (ref 7–16)
APTT PPP: >110 SEC (ref 23.5–31.7)
APTT PPP: >110 SEC (ref 23.5–31.7)
APTT PPP: >200 SEC (ref 23.5–31.7)
APTT PPP: >200 SEC (ref 23.5–31.7)
BUN SERPL-MCNC: 36 MG/DL (ref 8–23)
CALCIUM SERPL-MCNC: 8.3 MG/DL (ref 8.3–10.4)
CHLORIDE SERPL-SCNC: 107 MMOL/L (ref 98–107)
CO2 SERPL-SCNC: 24 MMOL/L (ref 21–32)
CREAT SERPL-MCNC: 2.65 MG/DL (ref 0.6–1)
GLUCOSE BLD STRIP.AUTO-MCNC: 120 MG/DL (ref 65–100)
GLUCOSE BLD STRIP.AUTO-MCNC: 140 MG/DL (ref 65–100)
GLUCOSE BLD STRIP.AUTO-MCNC: 151 MG/DL (ref 65–100)
GLUCOSE BLD STRIP.AUTO-MCNC: 153 MG/DL (ref 65–100)
GLUCOSE SERPL-MCNC: 147 MG/DL (ref 65–100)
INR PPP: 1.6 (ref 0.9–1.2)
MM INDURATION POC: 0 MM (ref 0–5)
POTASSIUM SERPL-SCNC: 4.3 MMOL/L (ref 3.5–5.1)
PPD POC: NEGATIVE NEGATIVE
PROTHROMBIN TIME: 18 SEC (ref 9.6–12)
SODIUM SERPL-SCNC: 141 MMOL/L (ref 136–145)

## 2017-04-09 PROCEDURE — 74011250636 HC RX REV CODE- 250/636: Performed by: PHYSICIAN ASSISTANT

## 2017-04-09 PROCEDURE — 85610 PROTHROMBIN TIME: CPT | Performed by: PHYSICIAN ASSISTANT

## 2017-04-09 PROCEDURE — 94760 N-INVAS EAR/PLS OXIMETRY 1: CPT

## 2017-04-09 PROCEDURE — 74011250637 HC RX REV CODE- 250/637: Performed by: PHYSICIAN ASSISTANT

## 2017-04-09 PROCEDURE — 51798 US URINE CAPACITY MEASURE: CPT

## 2017-04-09 PROCEDURE — 74011636637 HC RX REV CODE- 636/637: Performed by: INTERNAL MEDICINE

## 2017-04-09 PROCEDURE — 74011250637 HC RX REV CODE- 250/637: Performed by: INTERNAL MEDICINE

## 2017-04-09 PROCEDURE — 94640 AIRWAY INHALATION TREATMENT: CPT

## 2017-04-09 PROCEDURE — 65660000000 HC RM CCU STEPDOWN

## 2017-04-09 PROCEDURE — 85730 THROMBOPLASTIN TIME PARTIAL: CPT | Performed by: INTERNAL MEDICINE

## 2017-04-09 PROCEDURE — 82962 GLUCOSE BLOOD TEST: CPT

## 2017-04-09 PROCEDURE — 77010033678 HC OXYGEN DAILY

## 2017-04-09 PROCEDURE — 80048 BASIC METABOLIC PNL TOTAL CA: CPT | Performed by: NURSE PRACTITIONER

## 2017-04-09 PROCEDURE — 74011000250 HC RX REV CODE- 250: Performed by: INTERNAL MEDICINE

## 2017-04-09 PROCEDURE — 99232 SBSQ HOSP IP/OBS MODERATE 35: CPT | Performed by: INTERNAL MEDICINE

## 2017-04-09 PROCEDURE — 77030011943

## 2017-04-09 RX ORDER — METOPROLOL TARTRATE 25 MG/1
25 TABLET, FILM COATED ORAL EVERY 6 HOURS
Status: DISCONTINUED | OUTPATIENT
Start: 2017-04-09 | End: 2017-04-14 | Stop reason: HOSPADM

## 2017-04-09 RX ADMIN — Medication 5 ML: at 14:23

## 2017-04-09 RX ADMIN — LEVALBUTEROL HYDROCHLORIDE 0.63 MG: 0.63 SOLUTION RESPIRATORY (INHALATION) at 21:18

## 2017-04-09 RX ADMIN — PANTOPRAZOLE SODIUM 40 MG: 40 TABLET, DELAYED RELEASE ORAL at 05:53

## 2017-04-09 RX ADMIN — ATORVASTATIN CALCIUM 40 MG: 40 TABLET, FILM COATED ORAL at 21:42

## 2017-04-09 RX ADMIN — WARFARIN SODIUM 5 MG: 5 TABLET ORAL at 21:36

## 2017-04-09 RX ADMIN — HUMAN INSULIN 2 UNITS: 100 INJECTION, SOLUTION SUBCUTANEOUS at 22:49

## 2017-04-09 RX ADMIN — AMIODARONE HYDROCHLORIDE 400 MG: 200 TABLET ORAL at 08:47

## 2017-04-09 RX ADMIN — NIACIN 500 MG: 500 TABLET, FILM COATED, EXTENDED RELEASE ORAL at 21:37

## 2017-04-09 RX ADMIN — LEVALBUTEROL HYDROCHLORIDE 0.63 MG: 0.63 SOLUTION RESPIRATORY (INHALATION) at 02:32

## 2017-04-09 RX ADMIN — Medication 10 ML: at 21:43

## 2017-04-09 RX ADMIN — HUMAN INSULIN 2 UNITS: 100 INJECTION, SOLUTION SUBCUTANEOUS at 18:04

## 2017-04-09 RX ADMIN — LEVALBUTEROL HYDROCHLORIDE 0.63 MG: 0.63 SOLUTION RESPIRATORY (INHALATION) at 07:29

## 2017-04-09 RX ADMIN — HEPARIN SODIUM AND DEXTROSE 9 UNITS/KG/HR: 5000; 5 INJECTION INTRAVENOUS at 18:58

## 2017-04-09 RX ADMIN — METOPROLOL TARTRATE 25 MG: 25 TABLET, FILM COATED ORAL at 14:22

## 2017-04-09 RX ADMIN — LORATADINE 10 MG: 10 TABLET ORAL at 08:47

## 2017-04-09 RX ADMIN — METOPROLOL TARTRATE 12.5 MG: 25 TABLET ORAL at 01:00

## 2017-04-09 RX ADMIN — LEVALBUTEROL HYDROCHLORIDE 0.63 MG: 0.63 SOLUTION RESPIRATORY (INHALATION) at 14:38

## 2017-04-09 RX ADMIN — AMIODARONE HYDROCHLORIDE 400 MG: 200 TABLET ORAL at 21:42

## 2017-04-09 NOTE — PROGRESS NOTES
Carlotta Balloon  Admission Date: 4/4/2017             Daily Progress Note: 4/9/2017    The patient's chart is reviewed and the patient is discussed with the staff. Pt is a 81 yo  female with a history of HTN, HLD, asthma, and DM2. Pt presented to the ER with afib with RVR. She was also found to have elevated ddimer and CTA chest revealed bibasilar pulmonary emboli, small bilateral pleural effusions, pulmonary edema, and large hiatal hernia. Pt initially on heparin and transitioned to PO xarelto. Pt remains on amio gtt with poor control of afib. Subjective:     HR difficult to control and BP marginal.  Currently on O2 at 2 lpm via NC with O2 st 96%. Occasional cough productive of moderate amounts of light colored mucus. C/O R groin pain.       Current Facility-Administered Medications   Medication Dose Route Frequency    metoprolol tartrate (LOPRESSOR) tablet 25 mg  25 mg Oral Q6H    heparin 25,000 units in dextrose 500 mL infusion  18-36 Units/kg/hr IntraVENous TITRATE    warfarin (COUMADIN) tablet 5 mg  5 mg Oral QHS    amiodarone (CORDARONE) tablet 400 mg  400 mg Oral Q12H    sodium chloride (OCEAN) 0.65 % nasal spray 1 Spray  1 Spray Both Nostrils PRN    atorvastatin (LIPITOR) tablet 40 mg  40 mg Oral QHS    loratadine (CLARITIN) tablet 10 mg  10 mg Oral DAILY    niacin ER (NIASPAN) tablet 500 mg  500 mg Oral QHS    pantoprazole (PROTONIX) tablet 40 mg  40 mg Oral ACB    sodium chloride (NS) flush 5-10 mL  5-10 mL IntraVENous Q8H    sodium chloride (NS) flush 5-10 mL  5-10 mL IntraVENous PRN    LORazepam (ATIVAN) injection 1 mg  1 mg IntraVENous Q6H PRN    levalbuterol (XOPENEX) nebulizer soln 0.63 mg/3 mL  0.63 mg Nebulization Q6H RT    insulin regular (NOVOLIN R, HUMULIN R) injection   SubCUTAneous AC&HS    acetaminophen (TYLENOL) tablet 650 mg  650 mg Oral Q6H PRN       Review of Systems  Constitutional: negative for fever, chills, sweats  Cardiovascular: negative for chest pain, palpitations, syncope, edema  Gastrointestinal:  negative for dysphagia, reflux, vomiting, diarrhea, abdominal pain, or melena  Neurologic:  negative for focal weakness, numbness, headache    Objective:     Vitals:    04/09/17 0232 04/09/17 0508 04/09/17 0720 04/09/17 0729   BP:  97/55 94/57    Pulse:  (!) 106 (!) 107    Resp:  18 21    Temp:  97 °F (36.1 °C) 97.8 °F (36.6 °C)    SpO2: 99% 94% 98% 95%   Weight:  157 lb 6.4 oz (71.4 kg)     Height:  5' 1\" (1.549 m)       Intake and Output:   04/07 1901 - 04/09 0700  In: 18 [P.O.:570]  Out: 852 [Urine:850]       Physical Exam:   Constitution:  the patient is well developed and in no acute distress  EENMT:  Sclera clear, pupils equal, oral mucosa moist  Respiratory: diminished but CTA bilaterally, o2 at 2 lpm  Cardiovascular:  RRR without M,G,R  Gastrointestinal: soft and non-tender; with positive bowel sounds. Musculoskeletal: warm without cyanosis. There is 2+ lower leg edema.   Skin:  no jaundice or rashes, no open wounds   Neurologic: no gross neuro deficits     Psychiatric:  alert and oriented x 3    CHEST XRAY:   4/4        LAB  Recent Labs      04/09/17   0608  04/08/17   2208  04/08/17   1706  04/08/17   1110  04/08/17   0616   GLUCPOC  120*  148*  165*  137*  130*      Recent Labs      04/09/17   0606  04/08/17   1105  04/08/17   0452  04/07/17   0534   WBC   --    --   5.8  4.8   HGB   --    --   10.8*  9.8*   HCT   --    --   34.1*  30.5*   PLT   --    --   175  158   INR  1.6*  1.8*   --    --      Recent Labs      04/08/17   0452  04/07/17   0534   NA  141  144   K  4.6  3.8   CL  106  107   CO2  21  24   GLU  136*  59*   BUN  35*  31*   CREA  2.72*  2.37*   MG  2.2  1.7*   CA  8.7  8.4       Assessment:  (Medical Decision Making)     Hospital Problems  Date Reviewed: 4/8/2017          Codes Class Noted POA    Hypomagnesemia ICD-10-CM: C04.62  ICD-9-CM: 275.2  4/7/2017 Unknown    Most recent Mg++ 2.2 Acute renal failure (ARF) (HCC) ICD-10-CM: N17.9  ICD-9-CM: 584.9  4/7/2017 Unknown    Creat 2.72      Do not resuscitate (Chronic) ICD-10-CM: Z66  ICD-9-CM: V49.86 Chronic 4/7/2017 Unknown        * (Principal)PE (pulmonary thromboembolism) ([facility] [address] ICD-10-CM: I26.99  ICD-9-CM: 415.19  4/4/2017 Yes    xarelto changed to coumadin / heparin gtt secondary to renal failure      At[facility] fibrillation ([facility] ICD-10-CM: I48.91  ICD-9-CM: 427.31  4/4/2017 Yes    Rate control remains an issues - especially with ongoing hypotension      Pleural effusion ICD-10-CM: J90  ICD-9-CM: 511.9  4/4/2017 Yes    Minimal      Debility ICD-10-CM: R53.81  ICD-9-CM: 799.3  4/4/2017 Yes    Plan for discharge to rehab when medically stable      Hypoxemia ICD-10-CM: R09.02  ICD-9-CM: 799.02  4/4/2017 Unknown        Asthma ICD-10-CM: J45.909  ICD-9-CM: 493.90  7/1/2015 Yes    No active wheezing       BS range 120-165  SSI    Plan:  (Medical Decision Making)     --Xopenex   --heparin / coumadin - changed secondary to ARF  --amio / lopressor - HR remains in the 100s but BP in the 90s  --Palliative Care following - Patient is DNR  --PT following - ambulated 7'  --SW following - plan for rehab when medically stable for discharge    More than 50% of the time documented was spent in face-to-face contact with the patient and in the care of the patient on the floor/unit where the patient is located. Estiven Beverage, NP      Lungs:  Decreased BS  Heart:  RRR with no Murmur/Rubs/Gallops (NSR on monitor)    Additional Comments:  Complaining of tender abdomen in suprapubic region. Not urinating much per daughter. Almost rebound like tenderness. Will bladder scan. May need further eval if bladder not distended. I have spoken with and examined the patient. I agree with the above assessment and plan as documented.     Juan Brennan., MD

## 2017-04-09 NOTE — PROGRESS NOTES
Per Dr. Clemente Burkitt order an in and out cath performed with sterile technique. 100 ml of clear yellow urine drained from bladder. Patient reports no change in abdominal pain.

## 2017-04-09 NOTE — PROGRESS NOTES
Patient MEWS score 3 related to low BP, not currently symptomatic. Metoprolol dose increased today. Dr. Janelle Medrano and clinical coordinator aware of patient's condition.

## 2017-04-09 NOTE — PROGRESS NOTES
Received bedside and verbal report from Lauri [facility], [address] [facility]. Heparin gtt continues to run.

## 2017-04-09 NOTE — PROGRESS NOTES
[facility] CARDIOLOGY PROGRESS NOTE           4/9/2017 7:44 AM    Admit Date: 4/4/2017      Subjective:   Pt reports feeling OK this AM without much SOB, wearing O2, denies pain. BP up to 112/69 this AM,     ROS:  Cardiovascular:  As noted above    Objective:      Vitals:    04/09/17 0053 04/09/17 0100 04/09/17 0232 04/09/17 0508   BP: 94/57 105/59  97/55   Pulse: (!) 105 (!) 103  (!) 106   Resp: 20   18   Temp: 97.6 °F (36.4 °C)   97 °F (36.1 °C)   SpO2: 91%  99% 94%   Weight:    71.4 kg (157 lb 6.4 oz)   Height:    5' 1\" (1.549 m)       Physical Exam:  General-No Acute Distress  Neck- supple, no JVD  CV- irregular rapid rate and rhythm  Lung- diminished BS bilaterally  Abd- soft, nontender, nondistended  Ext- no edema bilaterally. Skin- warm and dry    Data Review:   Recent Labs      04/09/17   0606  04/08/17   1105  04/08/17   0452  04/07/17   0534   NA   --    --   141  144   K   --    --   4.6  3.8   MG   --    --   2.2  1.7*   BUN   --    --   35*  31*   CREA   --    --   2.72*  2.37*   GLU   --    --   136*  59*   WBC   --    --   5.8  4.8   HGB   --    --   10.8*  9.8*   HCT   --    --   34.1*  30.5*   PLT   --    --   175  158   INR  1.6*  1.8*   --    --        Assessment/Plan:     Principal Problem:    [facility] (4/4/2017)- Xarelto per pulmonary    Active Problems:    Asthma (7/1/2015)      Atrial fibrillation ([facility] (4/4/2017)- stopped Lisinopril and HCTZ for increased BP and ability to add BB, start Lopressor 12.5 mg q 6 h for better HR control, monitor      Pleural effusion (4/4/2017) The current medical regimen is effective;  continue present plan and medications.         Debility (4/4/2017)      Hypoxemia (4/4/2017)- on O2 per pulmonary  Increase metoprolol    Anna Marie Peterson MD  4/9/2017 7:44 AM

## 2017-04-09 NOTE — PROGRESS NOTES
Laboratory Tech. Misty Del Rosario notified this RN that the PTT that was collected at 1735 hr was canceled and that another PTT was necessary.

## 2017-04-09 NOTE — PROGRESS NOTES
New IV Site established, blood obtained for PTT in blue top vial, specimen labeled and then hand delivered to laboratory, (handed to [employer]).

## 2017-04-09 NOTE — PROGRESS NOTES
Problem: Patient Education: Go to Patient Education Activity  Goal: Patient/Family Education  Outcome: Progressing Towards Goal  Gripper socks on. Call bell in reach. Patient instructed to call for assistance; verbalized understanding.

## 2017-04-09 NOTE — PROGRESS NOTES
Fatmata Degree. Tech. Called PTT results: resulted and verified at [facility]). Heparin stopped, see MAR.

## 2017-04-10 ENCOUNTER — APPOINTMENT (OUTPATIENT)
Dept: GENERAL RADIOLOGY | Age: 82
DRG: 176 | End: 2017-04-10
Attending: NURSE PRACTITIONER
Payer: COMMERCIAL

## 2017-04-10 PROBLEM — J45.909 ASTHMA: Chronic | Status: ACTIVE | Noted: 2017-04-04

## 2017-04-10 PROBLEM — E83.42 HYPOMAGNESEMIA: Status: RESOLVED | Noted: 2017-04-07 | Resolved: 2017-04-10

## 2017-04-10 LAB
ANION GAP BLD CALC-SCNC: 11 MMOL/L (ref 7–16)
APTT PPP: 55.5 SEC (ref 23.5–31.7)
APTT PPP: 64.9 SEC (ref 23.5–31.7)
APTT PPP: 96.5 SEC (ref 23.5–31.7)
BUN SERPL-MCNC: 39 MG/DL (ref 8–23)
CALCIUM SERPL-MCNC: 8.9 MG/DL (ref 8.3–10.4)
CHLORIDE SERPL-SCNC: 107 MMOL/L (ref 98–107)
CO2 SERPL-SCNC: 23 MMOL/L (ref 21–32)
CREAT SERPL-MCNC: 2.37 MG/DL (ref 0.6–1)
GLUCOSE BLD STRIP.AUTO-MCNC: 107 MG/DL (ref 65–100)
GLUCOSE BLD STRIP.AUTO-MCNC: 133 MG/DL (ref 65–100)
GLUCOSE BLD STRIP.AUTO-MCNC: 149 MG/DL (ref 65–100)
GLUCOSE BLD STRIP.AUTO-MCNC: 176 MG/DL (ref 65–100)
GLUCOSE SERPL-MCNC: 92 MG/DL (ref 65–100)
INR PPP: 1.9 (ref 0.9–1.2)
MM INDURATION POC: 0 MM (ref 0–5)
POTASSIUM SERPL-SCNC: 4.4 MMOL/L (ref 3.5–5.1)
PPD POC: NEGATIVE NEGATIVE
PROTHROMBIN TIME: 20.9 SEC (ref 9.6–12)
SODIUM SERPL-SCNC: 141 MMOL/L (ref 136–145)

## 2017-04-10 PROCEDURE — 36415 COLL VENOUS BLD VENIPUNCTURE: CPT | Performed by: PHYSICIAN ASSISTANT

## 2017-04-10 PROCEDURE — 74011250637 HC RX REV CODE- 250/637: Performed by: INTERNAL MEDICINE

## 2017-04-10 PROCEDURE — 85610 PROTHROMBIN TIME: CPT | Performed by: PHYSICIAN ASSISTANT

## 2017-04-10 PROCEDURE — 85730 THROMBOPLASTIN TIME PARTIAL: CPT | Performed by: INTERNAL MEDICINE

## 2017-04-10 PROCEDURE — 74011000250 HC RX REV CODE- 250: Performed by: INTERNAL MEDICINE

## 2017-04-10 PROCEDURE — 85730 THROMBOPLASTIN TIME PARTIAL: CPT | Performed by: PHYSICIAN ASSISTANT

## 2017-04-10 PROCEDURE — 97530 THERAPEUTIC ACTIVITIES: CPT

## 2017-04-10 PROCEDURE — 65660000000 HC RM CCU STEPDOWN

## 2017-04-10 PROCEDURE — 74011250636 HC RX REV CODE- 250/636: Performed by: INTERNAL MEDICINE

## 2017-04-10 PROCEDURE — 94640 AIRWAY INHALATION TREATMENT: CPT

## 2017-04-10 PROCEDURE — 74000 XR ABD (KUB): CPT

## 2017-04-10 PROCEDURE — 77010033678 HC OXYGEN DAILY

## 2017-04-10 PROCEDURE — 80048 BASIC METABOLIC PNL TOTAL CA: CPT | Performed by: PHYSICIAN ASSISTANT

## 2017-04-10 PROCEDURE — 74011250637 HC RX REV CODE- 250/637: Performed by: NURSE PRACTITIONER

## 2017-04-10 PROCEDURE — 74011250637 HC RX REV CODE- 250/637: Performed by: PHYSICIAN ASSISTANT

## 2017-04-10 PROCEDURE — 74011636637 HC RX REV CODE- 636/637: Performed by: INTERNAL MEDICINE

## 2017-04-10 PROCEDURE — 94760 N-INVAS EAR/PLS OXIMETRY 1: CPT

## 2017-04-10 PROCEDURE — 99232 SBSQ HOSP IP/OBS MODERATE 35: CPT | Performed by: INTERNAL MEDICINE

## 2017-04-10 PROCEDURE — 82962 GLUCOSE BLOOD TEST: CPT

## 2017-04-10 RX ORDER — LORAZEPAM 2 MG/ML
0.5 INJECTION INTRAMUSCULAR
Status: DISCONTINUED | OUTPATIENT
Start: 2017-04-10 | End: 2017-04-14 | Stop reason: HOSPADM

## 2017-04-10 RX ORDER — DOCUSATE SODIUM 100 MG/1
100 CAPSULE, LIQUID FILLED ORAL 2 TIMES DAILY
Status: DISCONTINUED | OUTPATIENT
Start: 2017-04-10 | End: 2017-04-14 | Stop reason: HOSPADM

## 2017-04-10 RX ORDER — HEPARIN SODIUM 5000 [USP'U]/ML
35 INJECTION, SOLUTION INTRAVENOUS; SUBCUTANEOUS ONCE
Status: COMPLETED | OUTPATIENT
Start: 2017-04-10 | End: 2017-04-10

## 2017-04-10 RX ADMIN — Medication 10 ML: at 22:25

## 2017-04-10 RX ADMIN — METOPROLOL TARTRATE 25 MG: 25 TABLET, FILM COATED ORAL at 17:40

## 2017-04-10 RX ADMIN — LEVALBUTEROL HYDROCHLORIDE 0.63 MG: 0.63 SOLUTION RESPIRATORY (INHALATION) at 07:34

## 2017-04-10 RX ADMIN — HUMAN INSULIN 2 UNITS: 100 INJECTION, SOLUTION SUBCUTANEOUS at 22:30

## 2017-04-10 RX ADMIN — HEPARIN SODIUM 2600 UNITS: 5000 INJECTION, SOLUTION INTRAVENOUS; SUBCUTANEOUS at 06:59

## 2017-04-10 RX ADMIN — ACETAMINOPHEN 650 MG: 325 TABLET, FILM COATED ORAL at 12:50

## 2017-04-10 RX ADMIN — AMIODARONE HYDROCHLORIDE 400 MG: 200 TABLET ORAL at 08:18

## 2017-04-10 RX ADMIN — DOCUSATE SODIUM 100 MG: 100 CAPSULE, LIQUID FILLED ORAL at 17:40

## 2017-04-10 RX ADMIN — METOPROLOL TARTRATE 25 MG: 25 TABLET, FILM COATED ORAL at 12:50

## 2017-04-10 RX ADMIN — LORATADINE 10 MG: 10 TABLET ORAL at 08:18

## 2017-04-10 RX ADMIN — AMIODARONE HYDROCHLORIDE 400 MG: 200 TABLET ORAL at 22:24

## 2017-04-10 RX ADMIN — METOPROLOL TARTRATE 25 MG: 25 TABLET, FILM COATED ORAL at 08:18

## 2017-04-10 RX ADMIN — PANTOPRAZOLE SODIUM 40 MG: 40 TABLET, DELAYED RELEASE ORAL at 08:18

## 2017-04-10 RX ADMIN — ATORVASTATIN CALCIUM 40 MG: 40 TABLET, FILM COATED ORAL at 22:25

## 2017-04-10 RX ADMIN — Medication 5 ML: at 08:19

## 2017-04-10 RX ADMIN — LEVALBUTEROL HYDROCHLORIDE 0.63 MG: 0.63 SOLUTION RESPIRATORY (INHALATION) at 14:10

## 2017-04-10 RX ADMIN — NIACIN 500 MG: 500 TABLET, FILM COATED, EXTENDED RELEASE ORAL at 22:25

## 2017-04-10 RX ADMIN — METOPROLOL TARTRATE 25 MG: 25 TABLET, FILM COATED ORAL at 00:19

## 2017-04-10 RX ADMIN — LEVALBUTEROL HYDROCHLORIDE 0.63 MG: 0.63 SOLUTION RESPIRATORY (INHALATION) at 20:39

## 2017-04-10 RX ADMIN — WARFARIN SODIUM 5 MG: 5 TABLET ORAL at 22:25

## 2017-04-10 NOTE — PROGRESS NOTES
Problem: Pulmonary Embolism Care Plan (Adult)  Goal: *Labs within defined limits  Outcome: Progressing Towards Goal  INR 1.9 today. Heparin gtt continues to infuse to bridge to Coumadin. Problem: Falls - Risk of  Goal: *Absence of falls  Outcome: Progressing Towards Goal  Bed alarm on. Patient with yellow non-skid socks in place, call light within reach, bed rails up x2. Daughter at bedside. Verbalizes understanding to call for any assistance.

## 2017-04-10 NOTE — PROGRESS NOTES
PTT has not been drawn yet. Time PTT for 1845. Called Gagan in the lab. Ronel Allison is on the floors already and will be there soon. Will continue to monitor.

## 2017-04-10 NOTE — PROGRESS NOTES
Problem: Mobility Impaired (Adult and Pediatric)  Goal: *Acute Goals and Plan of Care (Insert Text)  LTG:  (1.)Ms. Caesar Andrews will move from supine to sit and sit to supine , scoot up and down and roll side to side in bed with STAND BY ASSIST within 7 day(s). (2.)Ms. Caesar Andrews will transfer from bed to chair and chair to bed with STAND BY ASSIST using the least restrictive device within 7 day(s). (3.)Ms. Caesar Andrews will ambulate with CONTACT GUARD ASSIST for 250 feet with the least restrictive device within 7 day(s). (4.)Ms. Caesar Andrews will negotiate up/down 2 steps with with CONTACT GUARD ASSIST within 7 days. ________________________________________________________________________________________________      PHYSICAL THERAPY: Daily Note, Treatment Day: 3rd and AM 4/10/2017  INPATIENT: Hospital Day: 7  Payor: FIRST CHO[employer] VIP CARE PLUS / [place] [employer] VIP [place]US / Product Type: [employer] Medicare /      NAME/AGE/GENDER: Miguel Garland is a 80 y.o. female  PRIMARY DIAGNOSIS: PE (pulmonary thromboembolism) ([address] PE (pulmonary thromboembolism) ([facility] [address] PE (pulmonary thromboembolism) ([facility] [address]        ICD-10: Treatment Diagnosis:       · Generalized Muscle Weakness (M62.81)  · Other lack of cordination (R27.8)  · Difficulty in walking, Not elsewhere classified (R26.2)   Precaution/Allergies:  Review of patient's allergies indicates no known allergies. ASSESSMENT:      Ms. Caesar Andrews presents to physical therapy in supine with daughter present. Agreeable to PT treatment. C/O pain in abdomen/side. Says, \"I don't know if I can do any walking today. \" Sits to EOB with MIN A. In sitting, /70, HR 97, O2 sats 98% on 1L. Stands with MIN A. Improved command following but still impulsive at times. Performs bed>chair transfer with MIN A and RW, gait belt donned. MOD verbal cues for walker management and safety; continues to show disregard for lines/leads. Upon sitting, C/O SOB and fatigue.  BP 99/69, O2 sats 85% on 1L. BLE elevated and encouraged pursed lip breathing with incremental increase of O2. Improved /67, O2 sats 100% on 3L; returned to 1L. Needs in reach. No progress this session. Will continue POC. Per IDALIA note, waiting on word for rehab bed. She would be a great candidate for rehab as she is unsafe to return home at this time. This section established at most recent assessment   PROBLEM LIST (Impairments causing functional limitations):  1. Decreased Strength  2. Decreased ADL/Functional Activities  3. Decreased Transfer Abilities  4. Decreased Ambulation Ability/Technique  5. Decreased Balance  6. Decreased Activity Tolerance  7. Increased Fatigue  8. Increased Shortness of Breath    INTERVENTIONS PLANNED: (Benefits and precautions of physical therapy have been discussed with the patient.)  1. Balance Exercise  2. Bed Mobility  3. Family Education  4. Gait Training  5. Therapeutic Activites  6. Therapeutic Exercise/Strengthening  7. Transfer Training  8. Group Therapy      TREATMENT PLAN: Frequency/Duration: 3 times a week for duration of hospital stay  Rehabilitation Potential For Stated Goals: FAIR      RECOMMENDED REHABILITATION/EQUIPMENT: (at time of discharge pending progress): Continue Skilled Therapy and Rehab. HISTORY:   History of Present Injury/Illness (Reason for Referral):  Per H&P, \"Patient is a 80 y.o.  female presents with SOB and R shoulder pain. She went to see her PCP yesterday and she was told she had something wrong with her heart and she was place don lasix. She has LE edema. She denies chest pain. She presented with A fib qith RVR and also PE. I was asked to admit her. \"  Past Medical History/Comorbidities:   Ms. Isac Medley  has a past medical history of Asthma (7/1/2015); Heartburn (7/1/2015); Hyperlipidemia (7/1/2015); Hypertension (7/1/2015); Type 2 diabetes mellitus ([facility] [address] and Urge incontinence (7/1/2015).   Ms. Isac Medley  has a past surgical history that includes cataract removal (2011). Social History/Living Environment:   Home Environment: Private residence  # Steps to Enter: 3  One/Two Story Residence: One story  Living Alone:  (moving in with daughter)  Support Systems: Family member(s)  Patient Expects to be Discharged to[de-identified] Private residence  Current DME Used/Available at Home: Cane, quad, Grab bars  Tub or Shower Type: Tub/Shower combination  Prior Level of Function/Work/Activity:  Independent. Some assistance with mobility. Uses cane. Number of Personal Factors/Comorbidities that affect the Plan of Care:  Age  Decreased insight into deficits  Unsteady on feet  SOB/hx of PE 3+: HIGH COMPLEXITY   EXAMINATION:   Most Recent Physical Functioning:   Gross Assessment:  AROM: Generally decreased, functional  Strength: Generally decreased, functional               Posture:  Posture (WDL): Exceptions to WDL  Posture Assessment: Kyphosis, Forward head, Rounded shoulders  Balance:  Sitting: Impaired  Sitting - Static: Good (unsupported)  Sitting - Dynamic: Fair (occasional)  Standing: Impaired  Standing - Static: Fair  Standing - Dynamic : Poor Bed Mobility:  Rolling: Minimum assistance  Supine to Sit: Minimum assistance  Scooting: Stand-by asssistance  Wheelchair Mobility:     Transfers:  Sit to Stand: Minimum assistance  Stand to Sit: Minimum assistance  Stand Pivot Transfers: Minimal assistance  Bed to Chair: Minimum assistance  Level of Assistance: Minimum assistance  Interventions: Safety awareness training; Tactile cues; Verbal cues  Duration: 24 Minutes  Gait:     Base of Support: Center of gravity altered  Speed/[place]: Accelerated  Step Length: Right shortened;Left shortened  Gait Abnormalities: Decreased step clearance;Trunk sway increased  Distance (ft): 3 Feet (ft)  Assistive Device: Walker, rolling;Gait belt  Ambulation - Level of Assistance: Minimal assistance  Interventions: Safety awareness training; Tactile cues; Verbal cues       Body Structures Involved:  1. Lungs Body Functions Affected:  1. Respiratory  2. Neuromusculoskeletal  3. Movement Related Activities and Participation Affected:  1. General Tasks and Demands  2. Mobility  3. Self Care  4. Domestic Life  5. Community, Social and [place]   Number of elements that affect the Plan of Care: 4+: HIGH COMPLEXITY   CLINICAL PRESENTATION:   Presentation: Stable and uncomplicated: LOW COMPLEXITY   CLINICAL DECISION MAKIN [facility] Mobility Inpatient Short Form  How much difficulty does the patient currently have. .. Unable A Lot A Little None   1. Turning over in bed (including adjusting bedclothes, sheets and blankets)? [ ] 1   [ ] 2   [ ] 3   [X] 4   2. Sitting down on and standing up from a chair with arms ( e.g., wheelchair, bedside commode, etc.)   [ ] 1   [ ] 2   [ ] 3   [X] 4   3. Moving from lying on back to sitting on the side of the bed? [ ] 1   [ ] 2   [ ] 3   [X] 4   How much help from another person does the patient currently need. .. Total A Lot A Little None   4. Moving to and from a bed to a chair (including a wheelchair)? [ ] 1   [X] 2   [ ] 3   [ ] 4   5. Need to walk in hospital room? [ ] 1   [X] 2   [ ] 3   [ ] 4   6. Climbing 3-5 steps with a railing? [X] 1   [ ] 2   [ ] 3   [ ] 4   © , Trustees of [address] [place] [address], under license to [employer]. All rights reserved    Score:  Initial: 17 Most Recent: X (Date: 17 )     Interpretation of Tool:  Represents activities that are increasingly more difficult (i.e. Bed mobility, Transfers, Gait).        Score 24 23 22-20 19-15 14-10 9-7 6       Modifier CH CI CJ CK CL CM CN         · Mobility - Walking and Moving Around:               - CURRENT STATUS:    CK - 40%-59% impaired, limited or restricted               - GOAL STATUS:           CJ - 20%-39% impaired, limited or restricted               - D/C STATUS:                       ---------------To be determined---------------  Payor: FIRST CHOICE VIP CARE PLUS [place] [employer] VIP [place]US / Product Type: Managed Care Medicare /       Medical Necessity:     · Patient is expected to demonstrate progress in strength, balance, coordination and functional technique to improve safety during bed mobility, transfers, and ambulation. Reason for Services/Other Comments:  · Patient continues to require present interventions due to patient's inability to safely transfer and ambulate with assistive device. Use of outcome tool(s) and clinical judgement create a POC that gives a: Questionable prediction of patient's progress: MODERATE COMPLEXITY                 TREATMENT:   (In addition to Assessment/Re-Assessment sessions the following treatments were rendered)   Pre-treatment Symptoms/Complaints:  \"I feel weak\"   Pain: Initial:     Pain Intensity 1: 5  Pain Location 1: Abdomen, Flank  Pain Intervention(s) 1: Repositioned, Nurse notified  Post Session:  Same        Therapeutic Activity: (  24 Minutes): Therapeutic activities including bed mobility, Ambulation on level ground, sit to stand transfers, walker safety to improve mobility, strength, balance and coordination. Required moderate Safety awareness training; Tactile cues; Verbal cues to promote static and dynamic balance in standing and promote coordination of bilateral, lower extremity(s). Braces/Orthotics/Lines/Etc:   · IV and NC 1L  Treatment/Session Assessment:    · Response to Treatment:  Poor safety awareness, decreased balance, limited ambulation distance  · Interdisciplinary Collaboration:  · Physical Therapist  · Registered Nurse  · After treatment position/precautions:  · Up in chair, Bed/Chair-wheels locked, Bed in low position, Call light within reach and Family at bedside  · Compliance with Program/Exercises: Will assess as treatment progresses. · Recommendations/Intent for next treatment session:   \"Next visit will focus on advancements to more challenging activities and reduction in assistance provided\".   Total Treatment Duration:  PT Patient Time In/Time Out  Time In: 1136  Time Out: [address]

## 2017-04-10 NOTE — PROGRESS NOTES
[facility] CARDIOLOGY PROGRESS NOTE    4/10/2017 7:45 AM    Admit Date: 4/4/2017    Admit Diagnosis: PE (pulmonary thromboembolism) (HCC)      Subjective:   Stable since yesterday without interval angina, CHF, palpitations, edema, presyncope or syncope. Vitals controlled and tolerating meds well but A-flutter persists, HR 's on amio and BB's. INR trending out on coumadin. 1.9 today. Objective:      Vitals:    04/09/17 2348 04/10/17 0309 04/10/17 0413 04/10/17 0737   BP: 95/66 96/69 107/72    Pulse: (!) 102  99    Resp: 20  20    Temp: 97 °F (36.1 °C)  97 °F (36.1 °C)    SpO2: 97%   98%   Weight:   73.7 kg (162 lb 8 oz)    Height:           Physical Exam:  Neck- supple, no JVD  CV- elevated rate but regular and rhythm no MRG  Lung- clear bilaterally, decreased bibasilar  Abd- soft, nontender, nondistended  Ext- trace-1+ pitting anterior tibial edema  Skin- warm and dry    Data Review:   Recent Labs      04/10/17   0511  04/09/17   0606   04/08/17   0452   NA  141  141   --   141   K  4.4  4.3   --   4.6   MG   --    --    --   2.2   BUN  39*  36*   --   35*   CREA  2.37*  2.65*   --   2.72*   GLU  92  147*   --   136*   WBC   --    --    --   5.8   HGB   --    --    --   10.8*   HCT   --    --    --   34.1*   PLT   --    --    --   175   INR  1.9*  1.6*   < >   --     < > = values in this interval not displayed.        Assessment and Plan:        Principal Problem:  [facility] (4/4/2017)- converted to coumadin given renal failure- INR 1.9 today, continue heparin until INR> at least 2     Active Problems:  Asthma (7/1/2015)     Atrial fibrillation ([facility] (4/4/2017)- stopped Lisinopril and HCTZ for increased BP and ability to add BB, started Lopressor and currently cannot tolerate any more BB or CCB therapy- DECREASE AMIO TOMORROW     Pleural effusion (4/4/2017)- surveillance per pulmonary, comfortable at rest     Debility (4/4/2017)- rehab/[facility] at discharge, per primary MD's     Hypoxemia (4/4/2017)- on O2 per pulmonary       A.  Imani Hermosillo MD  [facility] Cardiology  Pager [phone]

## 2017-04-10 NOTE — PROGRESS NOTES
Bedside and Verbal shift change report given to Chau Mack RN (oncoming nurse) by self Giselle law). Report included the following information SBAR, Kardex, MAR and Recent Results. Heparin gtt continues to run.

## 2017-04-10 NOTE — PROGRESS NOTES
Problem: Falls - Risk of  Goal: *Absence of falls  Outcome: Progressing Towards Goal  Pt progressing towards goal. No falls since admission. Bed low and locked. Call light within reach. Side rails x 2. Gripper socks applied. Personal belongings within reach. Pt verbalizes understanding to call for assistance. Daughter remains at bedside at all times. Bed alarm on.

## 2017-04-10 NOTE — PROGRESS NOTES
Verbal bedside report received from Marvelyn Cheadle, RN. Assumed care of patient. Heparin IV drip verified at bedside with outgoing RN.

## 2017-04-10 NOTE — PROGRESS NOTES
Verbal bedside report given to Melanie Mejias oncoming RN. Patient's situation, background, assessment and recommendations provided. Opportunity for questions provided. Oncoming RN assumed care of patient. Heparin IV drip verified at bedside with oncoming RN.

## 2017-04-10 NOTE — PROGRESS NOTES
Guero Jeffrey  Admission Date: 4/4/2017             Daily Progress Note: 4/10/2017    The patient's chart is reviewed and the patient is discussed with the staff. 81 yo  female with a history of HTN, HLD, asthma, and DM2. Pt presented to the ER with afib with RVR. She was also found to have elevated ddimer and CTA chest revealed bibasilar pulmonary emboli, small bilateral pleural effusions, pulmonary edema, and large hiatal hernia. Was initially placed on heparin drip and cardiology consulted--placed on amio drip for uncontrolled afib. Palliative Care consulted and DNR status was confirmed. ECHO with reduced EF 30-35%. Xarelto was added but changed to Coumadin due to acute renal failure.       Subjective:     Lying in bed and states is feeling better today. Denies shortness of breath or cough. Has had abdominal pain this morning--checked with ultrasound for possible urinary retention--100ml removed.     Current Facility-Administered Medications   Medication Dose Route Frequency    docusate sodium (COLACE) capsule 100 mg  100 mg Oral BID    metoprolol tartrate (LOPRESSOR) tablet 25 mg  25 mg Oral Q6H    heparin 25,000 units in dextrose 500 mL infusion  18-36 Units/kg/hr IntraVENous TITRATE    warfarin (COUMADIN) tablet 5 mg  5 mg Oral QHS    amiodarone (CORDARONE) tablet 400 mg  400 mg Oral Q12H    sodium chloride (OCEAN) 0.65 % nasal spray 1 Spray  1 Spray Both Nostrils PRN    atorvastatin (LIPITOR) tablet 40 mg  40 mg Oral QHS    loratadine (CLARITIN) tablet 10 mg  10 mg Oral DAILY    niacin ER (NIASPAN) tablet 500 mg  500 mg Oral QHS    pantoprazole (PROTONIX) tablet 40 mg  40 mg Oral ACB    sodium chloride (NS) flush 5-10 mL  5-10 mL IntraVENous Q8H    sodium chloride (NS) flush 5-10 mL  5-10 mL IntraVENous PRN    LORazepam (ATIVAN) injection 1 mg  1 mg IntraVENous Q6H PRN    levalbuterol (XOPENEX) nebulizer soln 0.63 mg/3 mL  0.63 mg Nebulization Q6H RT    insulin regular (NOVOLIN R, HUMULIN R) injection   SubCUTAneous AC&HS    acetaminophen (TYLENOL) tablet 650 mg  650 mg Oral Q6H PRN       Review of Systems  Constitutional: negative for fever, chills, sweats  Cardiovascular: negative for chest pain, palpitations, syncope, edema  Gastrointestinal:  Abdominal pain at times, negative for dysphagia, reflux, vomiting, diarrhea or melena  Neurologic:  negative for focal weakness, numbness, headache    Objective:     Vitals:    04/10/17 0737 04/10/17 0820 04/10/17 1136 04/10/17 1250   BP:  105/69 115/70 138/67   Pulse:  (!) 101 97 99   Resp:       Temp:  97.5 °F (36.4 °C)     SpO2: 98%  98%    Weight:       Height:         Intake and Output:   04/08 1901 - 04/10 0700  In: 508.6 [P.O.:105; I.V.:403.6]  Out: 601 [Urine:600]       Physical Exam:   Constitution:  the patient is elderly and in no acute distress, NC 1L, sat 98%  EENMT:  Sclera clear, pupils equal, oral mucosa moist  Respiratory: few scattered crackles, no wheezing  Cardiovascular:  RRR without M,G,R  Gastrointestinal: soft and non-tender; with positive bowel sounds. Musculoskeletal: warm without cyanosis. There is no lower leg edema.   Skin:  no jaundice or rashes, no open wounds   Neurologic: no gross neuro deficits     Psychiatric:  alert and oriented x 2    CHEST XRAY: none today      LAB  Recent Labs      04/10/17   1211  04/10/17   0615  04/09/17 2010 04/09/17   1721  04/09/17   1032   GLUCPOC  133*  107*  151*  153*  140*      Recent Labs      04/10/17   0511  04/09/17   0606  04/08/17   1105  04/08/17   0452   WBC   --    --    --   5.8   HGB   --    --    --   10.8*   HCT   --    --    --   34.1*   PLT   --    --    --   175   INR  1.9*  1.6*  1.8*   --      Recent Labs      04/10/17   0511  04/09/17   0606  04/08/17   0452   NA  141  141  141   K  4.4  4.3  4.6   CL  107  107  106   CO2  23  24  21   GLU  92  147*  136*   BUN  39*  36*  35*   CREA  2.37*  2.65*  2.72*   MG   --    --   2.2 CA  8.9  8.3  8.7     No results for input(s): PH, PCO2, PO2, HCO3 in the last 72 hours. No results for input(s): LCAD, LAC in the last 72 hours. Assessment:  (Medical Decision Making)     Hospital Problems  Date Reviewed: 4/10/2017          Codes Class Noted POA    Abdominal pain ICD-10-CM: R10.9  ICD-9-CM: 789.00  4/9/2017 No    Check KUB    Acute renal failure (ARF) (HCC) ICD-10-CM: N17.9  ICD-9-CM: 584.9  4/7/2017 No    trending down--creatinine 2.37    Do not resuscitate (Chronic) ICD-10-CM: Z66  ICD-9-CM: V49.86 Chronic 4/7/2017 Yes    unchanged    Asthma (Chronic) ICD-10-CM: J45.909  ICD-9-CM: 493.90  4/4/2017 Yes    Chronic--no wheezing    * (Principal)PE (pulmonary thromboembolism) ([facility] [address] ICD-10-CM: I26.99  ICD-9-CM: 415.19  4/4/2017 Yes    Continue anticoagulation    Atrial fibrillation (HCC) ICD-10-CM: I48.91  ICD-9-CM: 427.31  4/4/2017 Yes    controlled    Pleural effusion ICD-10-CM: J90  ICD-9-CM: 511.9  4/4/2017 Yes    Small per CT scan    Debility ICD-10-CM: R53.81  ICD-9-CM: 799.3  4/4/2017 Yes    Going to rehab    Hypoxemia ICD-10-CM: R09.02  ICD-9-CM: 799.02  4/4/2017 Yes    Sat acceptable on NC          Plan:  (Medical Decision Making)     --Disposition to rehab--awaiting placement  --Continuing Heparin drip  --On Coumadin--INR 1.9 today  --Cardiology following and remains on Amio 400mg q12h  --Xopenex, Claritin  --Creatinine 2.37  --PT following   --Check KUB--complains of abdominal pain at times--had BM yesterday    More than 50% of the time documented was spent in face-to-face contact with the patient and in the care of the patient on the floor/unit where the patient is located. Yrn Cabezas NP    Lungs: clear  Heart:  RRR with no Murmur/Rubs/Gallops    Additional Comments: Iv heparin until coumadin is therapeutic    I have spoken with and examined the patient. I agree with the above assessment and plan as documented.     Aurelio Rodriguez MD

## 2017-04-11 LAB
ANION GAP BLD CALC-SCNC: 8 MMOL/L (ref 7–16)
APTT PPP: 54 SEC (ref 23.5–31.7)
BUN SERPL-MCNC: 39 MG/DL (ref 8–23)
CALCIUM SERPL-MCNC: 8.9 MG/DL (ref 8.3–10.4)
CHLORIDE SERPL-SCNC: 106 MMOL/L (ref 98–107)
CO2 SERPL-SCNC: 26 MMOL/L (ref 21–32)
CREAT SERPL-MCNC: 1.97 MG/DL (ref 0.6–1)
ERYTHROCYTE [DISTWIDTH] IN BLOOD BY AUTOMATED COUNT: 14.5 % (ref 11.9–14.6)
GLUCOSE BLD STRIP.AUTO-MCNC: 141 MG/DL (ref 65–100)
GLUCOSE BLD STRIP.AUTO-MCNC: 147 MG/DL (ref 65–100)
GLUCOSE BLD STRIP.AUTO-MCNC: 148 MG/DL (ref 65–100)
GLUCOSE BLD STRIP.AUTO-MCNC: 154 MG/DL (ref 65–100)
GLUCOSE SERPL-MCNC: 134 MG/DL (ref 65–100)
HCT VFR BLD AUTO: 27 % (ref 35.8–46.3)
HGB BLD-MCNC: 8.7 G/DL (ref 11.7–15.4)
INR PPP: 3.5 (ref 0.9–1.2)
MAGNESIUM SERPL-MCNC: 2.1 MG/DL (ref 1.8–2.4)
MCH RBC QN AUTO: 32.8 PG (ref 26.1–32.9)
MCHC RBC AUTO-ENTMCNC: 32.2 G/DL (ref 31.4–35)
MCV RBC AUTO: 101.9 FL (ref 79.6–97.8)
PLATELET # BLD AUTO: 196 K/UL (ref 150–450)
PMV BLD AUTO: 9.7 FL (ref 10.8–14.1)
POTASSIUM SERPL-SCNC: 4.6 MMOL/L (ref 3.5–5.1)
PROTHROMBIN TIME: 38.4 SEC (ref 9.6–12)
RBC # BLD AUTO: 2.65 M/UL (ref 4.05–5.25)
SODIUM SERPL-SCNC: 140 MMOL/L (ref 136–145)
WBC # BLD AUTO: 7.1 K/UL (ref 4.3–11.1)

## 2017-04-11 PROCEDURE — 74011250636 HC RX REV CODE- 250/636: Performed by: INTERNAL MEDICINE

## 2017-04-11 PROCEDURE — 77010033678 HC OXYGEN DAILY

## 2017-04-11 PROCEDURE — 85730 THROMBOPLASTIN TIME PARTIAL: CPT | Performed by: INTERNAL MEDICINE

## 2017-04-11 PROCEDURE — 65660000000 HC RM CCU STEPDOWN

## 2017-04-11 PROCEDURE — 97535 SELF CARE MNGMENT TRAINING: CPT

## 2017-04-11 PROCEDURE — 36415 COLL VENOUS BLD VENIPUNCTURE: CPT | Performed by: PHYSICIAN ASSISTANT

## 2017-04-11 PROCEDURE — 94760 N-INVAS EAR/PLS OXIMETRY 1: CPT

## 2017-04-11 PROCEDURE — 97530 THERAPEUTIC ACTIVITIES: CPT

## 2017-04-11 PROCEDURE — 80048 BASIC METABOLIC PNL TOTAL CA: CPT | Performed by: PHYSICIAN ASSISTANT

## 2017-04-11 PROCEDURE — 85027 COMPLETE CBC AUTOMATED: CPT | Performed by: PHYSICIAN ASSISTANT

## 2017-04-11 PROCEDURE — 94640 AIRWAY INHALATION TREATMENT: CPT

## 2017-04-11 PROCEDURE — 74011250637 HC RX REV CODE- 250/637: Performed by: INTERNAL MEDICINE

## 2017-04-11 PROCEDURE — 99232 SBSQ HOSP IP/OBS MODERATE 35: CPT | Performed by: INTERNAL MEDICINE

## 2017-04-11 PROCEDURE — 82962 GLUCOSE BLOOD TEST: CPT

## 2017-04-11 PROCEDURE — 83735 ASSAY OF MAGNESIUM: CPT | Performed by: PHYSICIAN ASSISTANT

## 2017-04-11 PROCEDURE — 74011000250 HC RX REV CODE- 250: Performed by: INTERNAL MEDICINE

## 2017-04-11 PROCEDURE — 74011636637 HC RX REV CODE- 636/637: Performed by: INTERNAL MEDICINE

## 2017-04-11 PROCEDURE — 85610 PROTHROMBIN TIME: CPT | Performed by: PHYSICIAN ASSISTANT

## 2017-04-11 RX ORDER — AMIODARONE HYDROCHLORIDE 200 MG/1
200 TABLET ORAL EVERY 12 HOURS
Status: DISCONTINUED | OUTPATIENT
Start: 2017-04-11 | End: 2017-04-14 | Stop reason: HOSPADM

## 2017-04-11 RX ORDER — HEPARIN SODIUM 5000 [USP'U]/ML
35 INJECTION, SOLUTION INTRAVENOUS; SUBCUTANEOUS ONCE
Status: COMPLETED | OUTPATIENT
Start: 2017-04-11 | End: 2017-04-11

## 2017-04-11 RX ADMIN — HUMAN INSULIN 2 UNITS: 100 INJECTION, SOLUTION SUBCUTANEOUS at 22:29

## 2017-04-11 RX ADMIN — AMIODARONE HYDROCHLORIDE 200 MG: 200 TABLET ORAL at 08:49

## 2017-04-11 RX ADMIN — HEPARIN SODIUM 2500 UNITS: 5000 INJECTION, SOLUTION INTRAVENOUS; SUBCUTANEOUS at 05:44

## 2017-04-11 RX ADMIN — Medication 5 ML: at 08:49

## 2017-04-11 RX ADMIN — LORATADINE 10 MG: 10 TABLET ORAL at 08:48

## 2017-04-11 RX ADMIN — ATORVASTATIN CALCIUM 40 MG: 40 TABLET, FILM COATED ORAL at 22:28

## 2017-04-11 RX ADMIN — DOCUSATE SODIUM 100 MG: 100 CAPSULE, LIQUID FILLED ORAL at 17:36

## 2017-04-11 RX ADMIN — METOPROLOL TARTRATE 25 MG: 25 TABLET, FILM COATED ORAL at 01:57

## 2017-04-11 RX ADMIN — LEVALBUTEROL HYDROCHLORIDE 0.63 MG: 0.63 SOLUTION RESPIRATORY (INHALATION) at 07:25

## 2017-04-11 RX ADMIN — Medication 10 ML: at 22:29

## 2017-04-11 RX ADMIN — LEVALBUTEROL HYDROCHLORIDE 0.63 MG: 0.63 SOLUTION RESPIRATORY (INHALATION) at 20:01

## 2017-04-11 RX ADMIN — LEVALBUTEROL HYDROCHLORIDE 0.63 MG: 0.63 SOLUTION RESPIRATORY (INHALATION) at 14:08

## 2017-04-11 RX ADMIN — DOCUSATE SODIUM 100 MG: 100 CAPSULE, LIQUID FILLED ORAL at 08:48

## 2017-04-11 RX ADMIN — METOPROLOL TARTRATE 25 MG: 25 TABLET, FILM COATED ORAL at 08:48

## 2017-04-11 RX ADMIN — NIACIN 500 MG: 500 TABLET, FILM COATED, EXTENDED RELEASE ORAL at 22:28

## 2017-04-11 RX ADMIN — METOPROLOL TARTRATE 25 MG: 25 TABLET, FILM COATED ORAL at 18:21

## 2017-04-11 RX ADMIN — AMIODARONE HYDROCHLORIDE 200 MG: 200 TABLET ORAL at 22:28

## 2017-04-11 RX ADMIN — METOPROLOL TARTRATE 25 MG: 25 TABLET, FILM COATED ORAL at 12:22

## 2017-04-11 RX ADMIN — Medication 10 ML: at 05:39

## 2017-04-11 RX ADMIN — PANTOPRAZOLE SODIUM 40 MG: 40 TABLET, DELAYED RELEASE ORAL at 08:48

## 2017-04-11 NOTE — PROGRESS NOTES
Verbal and bedside shift change report received from Debbi Vieira RN.  Heparin gtt verified at bedside

## 2017-04-11 NOTE — PROGRESS NOTES
Verbal bedside report given to beatriz Camargo RN. Patient's situation, background, assessment and recommendations provided. Opportunity for questions provided. Oncoming RN assumed care of patient.

## 2017-04-11 NOTE — PROGRESS NOTES
Problem: Self Care Deficits Care Plan (Adult)  Goal: *Acute Goals and Plan of Care (Insert Text)  1. Patient will complete lower body bathing and dressing with minimal assistance and adaptive equipment as needed. 2. Patient will complete toileting with minimal assistance. 3. Patient will tolerate 25 minutes of OT treatment with 1-2 rest breaks to increase activity tolerance for ADLs. 4. Patient will complete functional transfers with supervision and adaptive equipment as needed. 5. Patient will complete functional mobility with minimal cues for safety and minimal assistance to complete functional mobility. Timeframe: 7 visits       OCCUPATIONAL THERAPY: Daily Note, Treatment Day: 1st and PM 4/11/2017  INPATIENT: Hospital Day: 8  Payor: [employer] VIP CARE PLUS / [place] [employer] VIP CARE PLUS / Product Type: Managed Care Medicare /      NAME/AGE/GENDER: Carlotta Calderon is a 80 y.o. female  PRIMARY DIAGNOSIS:  PE (pulmonary thromboembolism) ([address] PE (pulmonary thromboembolism) ([address] PE (pulmonary thromboembolism) ([address]        ICD-10: Treatment Diagnosis:        · Generalized Muscle Weakness (M62.81)  · Other lack of cordination (R27.8)  · History of falling (Z91.81)  · Abnormal posture (R29.3)   Precautions/Allergies:         Review of patient's allergies indicates no known allergies. ASSESSMENT:      Ms. Damion Mcdaniel was supine in bed upon arrival with daughter at bedside. Pt was c/o abdominal pain this am. Pt states she hasn't had a bowel movement in a few days. Pt encouraged to attempt to use BSC. Pt voided but did not have success with bowel movement. Pt able to complete her own hygiene after toileting with CGA for balance. Pt's brief was changed with assistance to balance while pulling up brief. Pt needed sitting rest break after activity. Daughter was very supportive about patient completing activity.  Pt completed functional mobility in the hallway with minimal assistance and continued cues for safety with walker. Pt leans into forearms on walker at times. Pt did not tolerate as long of a distance today, stating she felt weaker. Pt's daughter helped to encourage patient to be up to the chair. Pt did overall do a little better with management of walker than the past. Pt to continue per plan of care. This section established at most recent assessment   PROBLEM LIST (Impairments causing functional limitations):  1. Decreased Strength  2. Decreased ADL/Functional Activities  3. Decreased Transfer Abilities  4. Decreased Ambulation Ability/Technique  5. Decreased Balance  6. Decreased Activity Tolerance  7. Increased Shortness of Breath  8. Decreased Flexibility/Joint Mobility  9. Decreased [place] with Home Exercise Program  10. Decreased Cognition    INTERVENTIONS PLANNED: (Benefits and precautions of occupational therapy have been discussed with the patient.)  1. Activities of daily living training  2. Adaptive equipment training  3. Balance training  4. Clothing management  5. Cognitive training  6. Donning&doffing training  7. Group therapy  8. Neuromuscular re-eduation  9. Theraputic activity  10. Theraputic exercise      TREATMENT PLAN: Frequency/Duration: Follow patient 3 times per week to address above goals. Rehabilitation Potential For Stated Goals: GOOD      RECOMMENDED REHABILITATION/EQUIPMENT: (at time of discharge pending progress): Continue Skilled Therapy and Rehab. OCCUPATIONAL PROFILE AND HISTORY:   History of Present Injury/Illness (Reason for Referral):  See H&P  Past Medical History/Comorbidities:   Ms. Damion Mcdaniel  has a past medical history of Asthma (7/1/2015); Heartburn (7/1/2015); Hyperlipidemia (7/1/2015); Hypertension (7/1/2015); Type 2 diabetes mellitus ([facility] [address] and Urge incontinence (7/1/2015). Ms. Damion Mcdaniel  has a past surgical history that includes cataract removal (2011).   Social History/Living Environment:   Home Environment: Private residence  # Steps to Enter: 3  One/Two Story Residence: One story  Living Alone:  (moving in with daughter)  Support Systems: Family member(s)  Patient Expects to be Discharged to[de-identified] Private residence  Current DME Used/Available at Home: Cane, quad, Grab bars  Tub or Shower Type: Tub/Shower combination  Prior Level of Function/Work/Activity:  Pt was living alone and completing functional mobility with a quad cane. Pt reports she was completing ADL without assistance. Pt was still completing her own cooking/cleaning as well. Pt reports recent fall out of chair. Personal Factors:          Social Background:  Living alone previously. Hx of falls. Overall Behavior:  Pt hard of hearing with decreased safety awareness in standing/functional mobility/functional transfers        Other factors that influence how disability is experienced by the patient:  Hard of hearing   Number of Personal Factors/Comorbidities that affect the Plan of Care: Extensive review of physical, cognitive, and psychosocial performance (3+):  HIGH COMPLEXITY   ASSESSMENT OF OCCUPATIONAL PERFORMANCE[de-identified]   Activities of Daily Living:           Basic ADLs (From Assessment) Complex ADLs (From Assessment)   Basic ADL  Feeding: Stand-by assistance  Oral Facial Hygiene/Grooming: Moderate assistance  Bathing: Moderate assistance  Upper Body Dressing: Moderate assistance  Lower Body Dressing: Maximum assistance  Toileting:  Moderate assistance Instrumental ADL  Meal Preparation: Maximum assistance  Homemaking: Maximum assistance   Grooming/Bathing/Dressing Activities of Daily Living     Cognitive Retraining  Safety/Judgement: Fall prevention         Lower Body Bathing  Perineal  : Minimum assistance  Position Performed: Seated on toilet;Standing  Adaptive Equipment: Walker Toileting  Toileting Assistance: Minimum assistance  Bladder Hygiene: Contact guard assistance  Clothing Management: Moderate assistance  Adaptive Equipment: Jennifer Earl Transfers  Toilet Transfer : Minimum assistance   Lower Body Dressing Assistance  Protective Undergarmet: Moderate assistance Bed/Mat Mobility  Rolling: Minimum assistance  Supine to Sit: Minimum assistance  Bed to Chair: Minimum assistance  Scooting: Contact guard assistance  Cues: Tactile cues provided;Verbal cues provided;Visual cues provided          Most Recent Physical Functioning:   Gross Assessment:  AROM: Generally decreased, functional  Strength: Generally decreased, functional               Posture:  Posture (WDL): Exceptions to WDL  Posture Assessment: Forward head, Kyphosis, Rounded shoulders  Balance:  Sitting: Impaired  Sitting - Static: Good (unsupported)  Sitting - Dynamic: Fair (occasional)  Standing: Impaired  Standing - Static: Fair  Standing - Dynamic : Poor Bed Mobility:  Rolling: Minimum assistance  Supine to Sit: Minimum assistance  Scooting: Contact guard assistance  Wheelchair Mobility:     Transfers:  Bed to Chair: Minimum assistance                 Patient Vitals for the past 6 hrs:   BP SpO2 O2 Flow Rate (L/min) Pulse   17 0727 110/73 96 % 1 l/min 96        Mental Status  Neurologic State: Alert  Orientation Level: Appropriate for age  Cognition: Decreased attention/concentration, Decreased command following  Perception: Appears intact  Perseveration: No perseveration noted  Safety/Judgement: Fall prevention                               Physical Skills Involved:  1. Balance  2. Mobility  3. Strength  4. Endurance Cognitive Skills Affected (resulting in the inability to perform in a timely and safe manner):  1. Attending  2. Perceiving  3. Understanding  4. Problem Solving  5. Mental Sequencing Psychosocial Skills Affected:  1. Habits  2. Routines and Behaviors  3.  Environmental Adaptations   Number of elements that affect the Plan of Care: 5+:  HIGH COMPLEXITY   CLINICAL DECISION MAKIN [facility] Mobility Inpatient Short Form  How much help from another person does the patient currently need. .. Total A Lot A Little None   1. Putting on and taking off regular lower body clothing?   [ ] 1   [X] 2   [ ] 3   [ ] 4   2. Bathing (including washing, rinsing, drying)? [ ] 1   [X] 2   [ ] 3   [ ] 4   3. Toileting, which includes using toilet, bedpan or urinal?   [ ] 1   [X] 2   [ ] 3   [ ] 4   4. Putting on and taking off regular upper body clothing?   [ ] 1   [X] 2   [ ] 3   [ ] 4   5. Taking care of personal grooming such as brushing teeth? [ ] 1   [X] 2   [ ] 3   [ ] 4   6. Eating meals? [ ] 1   [ ] 2   [X] 3   [ ] 4   © 2007, Trustees of [address] [place] [address] [address] [address], under license to [employer]. All rights reserved    Score:  Initial: 13 Most Recent: X (Date: -- )     Interpretation of Tool:  Represents activities that are increasingly more difficult (i.e. Bed mobility, Transfers, Gait). Score 24 23 22-20 19-15 14-10 9-7 6       Modifier CH CI CJ CK CL CM CN         · Self Care:               - CURRENT STATUS:    CL - 60%-79% impaired, limited or restricted               - GOAL STATUS:           CK - 40%-59% impaired, limited or restricted               - D/C STATUS:                       ---------------To be determined---------------  Payor: [employer] VIP C[employer] PLUS / [place] [employer] VIP CARE PLUS / Product Type: Managed Care Medicare /       Medical Necessity:     · Patient demonstrates good rehab potential due to higher previous functional level. Reason for Services/Other Comments:  · Patient continues to require skilled intervention due to decreased safety and increased risk for falls with ADL/functional mobility. Use of outcome tool(s) and clinical judgement create a POC that gives a: MODERATE COMPLEXITY             TREATMENT:   (In addition to Assessment/Re-Assessment sessions the following treatments were rendered)      Pre-treatment Symptoms/Complaints:  Reports being tired and wanting to sleep more today. Reports breathing better. Pain: Initial:   Pain Intensity 1: 3  Pain Location 1: Abdomen  Pain Intervention(s) 1: Repositioned Post Session:  same      Self Care: (15): Procedure(s) (per grid) utilized to improve and/or restore self-care/home management as related to dressing, bathing and toileting. Required minimal to moderate visual, verbal and manual cueing to facilitate activities of daily living skills. Therapeutic Activity: (    16): Therapeutic activities including Bed transfers, Chair transfers, Toilet transfers and Ambulation on level ground to improve mobility, strength, balance and coordination. Required minimal assistance   to promote dynamic balance in standing.     n/a       Braces/Orthotics/Lines/Etc:   · IV  · O2 Device: Nasal cannula  Treatment/Session Assessment:    · Response to Treatment:  Tolerated well. · Interdisciplinary Collaboration:  · Occupational Therapist  · Registered Nurse  · After treatment position/precautions:  · Up in chair, Bed/Chair-wheels locked, Call light within reach, RN notified, Family at bedside and Side rails x 3  · Compliance with Program/Exercises: Compliant today. · Recommendations/Intent for next treatment session:  Next visit will focus on advancements to more challenging activities and reduction in assistance provided.   Total Treatment Duration:  OT Patient Time In/Time Out  Time In: 1014  Time Out: [address], OTR/L

## 2017-04-11 NOTE — PROGRESS NOTES
Bianca Mann  Admission Date: 4/4/2017             Daily Progress Note: 4/11/2017    The patient's chart is reviewed and the patient is discussed with the staff. Subjective:     79 yo  female with a history of HTN, HLD, asthma, and DM2. Pt presented to the ER with afib with RVR. She was also found to have elevated ddimer and CTA chest revealed bibasilar pulmonary emboli, small bilateral pleural effusions, pulmonary edema, and large hiatal hernia. Was initially placed on heparin drip and cardiology consulted--placed on amio drip for uncontrolled afib. Palliative Care consulted and DNR status was confirmed. ECHO with reduced EF 30-35%.  Xarelto was added but changed to Coumadin due to acute renal failure    Current Facility-Administered Medications   Medication Dose Route Frequency    amiodarone (CORDARONE) tablet 200 mg  200 mg Oral Q12H    docusate sodium (COLACE) capsule 100 mg  100 mg Oral BID    LORazepam (ATIVAN) injection 0.5 mg  0.5 mg IntraVENous Q4H PRN    metoprolol tartrate (LOPRESSOR) tablet 25 mg  25 mg Oral Q6H    sodium chloride (OCEAN) 0.65 % nasal spray 1 Spray  1 Spray Both Nostrils PRN    atorvastatin (LIPITOR) tablet 40 mg  40 mg Oral QHS    loratadine (CLARITIN) tablet 10 mg  10 mg Oral DAILY    niacin ER (NIASPAN) tablet 500 mg  500 mg Oral QHS    pantoprazole (PROTONIX) tablet 40 mg  40 mg Oral ACB    sodium chloride (NS) flush 5-10 mL  5-10 mL IntraVENous Q8H    sodium chloride (NS) flush 5-10 mL  5-10 mL IntraVENous PRN    levalbuterol (XOPENEX) nebulizer soln 0.63 mg/3 mL  0.63 mg Nebulization Q6H RT    insulin regular (NOVOLIN R, HUMULIN R) injection   SubCUTAneous AC&HS    acetaminophen (TYLENOL) tablet 650 mg  650 mg Oral Q6H PRN       Review of Systems    Constitutional: negative for fever, chills, sweats  Cardiovascular: negative for chest pain, palpitations, syncope, edema  Gastrointestinal:  negative for dysphagia, reflux, vomiting, diarrhea, abdominal pain, or melena  Neurologic:  negative for focal weakness, numbness, headache    Objective:     Vitals:    04/11/17 0133 04/11/17 0459 04/11/17 0727 04/11/17 1413   BP: 97/67 96/60 110/73    Pulse: 93 91 96    Resp: 16 16 20    Temp: 97.6 °F (36.4 °C) 97.6 °F (36.4 °C) 97.4 °F (36.3 °C)    SpO2: 94% 99% 96% 99%   Weight:  157 lb 6.4 oz (71.4 kg)     Height:  5' 1\" (1.549 m)       Intake and Output:   04/09 1901 - 04/11 0700  In: 1289.9 [P.O.:985; I.V.:304.9]  Out: 100 [Urine:100]  04/11 0701 - 04/11 1900  In: 20.7 [I.V.:20.7]  Out: -     Physical Exam:   Constitution:  the patient is well developed and in no acute distress  EENMT:  Sclera clear, pupils equal, oral mucosa moist  Respiratory: clear  Cardiovascular:  RRR without M,G,R  Gastrointestinal: soft and non-tender; with positive bowel sounds. Musculoskeletal: warm without cyanosis. There is no lower leg edema. Skin:  no jaundice or rashes, no wounds   Neurologic: no gross neuro deficits     Psychiatric:  alert and oriented x 3    CHEST XRAY:       LAB  Recent Labs      04/11/17   1152  04/11/17   0625  04/10/17   2117  04/10/17   1740  04/10/17   1211   GLUCPOC  147*  141*  176*  149*  133*      Recent Labs      04/11/17   0400  04/10/17   0511  04/09/17   0606   WBC  7.1   --    --    HGB  8.7*   --    --    HCT  27.0*   --    --    PLT  196   --    --    INR  3.5*  1.9*  1.6*     Recent Labs      04/11/17   0400  04/10/17   0511  04/09/17   0606   NA  140  141  141   K  4.6  4.4  4.3   CL  106  107  107   CO2  26  23  24   GLU  134*  92  147*   BUN  39*  39*  36*   CREA  1.97*  2.37*  2.65*   MG  2.1   --    --    CA  8.9  8.9  8.3     No results for input(s): PH, PCO2, PO2, HCO3 in the last 72 hours. No results for input(s): LCAD, LAC in the last 72 hours.       Assessment:  (Medical Decision Making)     Hospital Problems  Date Reviewed: 4/10/2017          Codes Class Noted POA    Abdominal pain ICD-10-CM: R10.9  ICD-9-CM: 789.00  4/9/2017 No        Acute renal failure (ARF) (HCC) ICD-10-CM: N17.9  ICD-9-CM: 584.9  4/7/2017 No    better    Do not resuscitate (Chronic) ICD-10-CM: Z66  ICD-9-CM: V49.86 Chronic 4/7/2017 Yes        Asthma (Chronic) ICD-10-CM: J45.909  ICD-9-CM: 493.90  4/4/2017 Yes    No wheezing    * (Principal)PE (pulmonary thromboembolism) ([facility] [address] ICD-10-CM: I26.99  ICD-9-CM: 415.19  4/4/2017 Yes    inr 3.5    Atrial fibrillation ([facility] ICD-10-CM: I48.91  ICD-9-CM: 427.31  4/4/2017 Yes    flutter    Pleural effusion ICD-10-CM: J90  ICD-9-CM: 511.9  4/4/2017 Yes        Debility ICD-10-CM: R53.81  ICD-9-CM: 799.3  4/4/2017 Yes        Hypoxemia ICD-10-CM: R09.02  ICD-9-CM: 799.02  4/4/2017 Yes    On nc          Plan:  (Medical Decision Making)   1   To rehab when able  2   Coumadin on hold- inr high  --    More than 50% of the time documented was spent in face-to-face contact with the patient and in the care of the patient on the floor/unit where the patient is located.     Maxim Kwok MD

## 2017-04-11 NOTE — PROGRESS NOTES
Problem: Afib: Discharge Outcomes (not in CAT)  Goal: *Hemodynamically stable  Outcome: Progressing Towards Goal  INR 3.5. Coumadin on hold for tonight with repeat INR in AM. Heparin gtt turned off.

## 2017-04-11 NOTE — PROGRESS NOTES
[facility] CARDIOLOGY PROGRESS NOTE    4/11/2017 7:54 AM    Admit Date: 4/4/2017    Admit Diagnosis: PE (pulmonary thromboembolism) (HCC)      Subjective:   Stable without overnight angina, CHF, or palpitations. Flutter persists but asymptomatic and rate controlled. INR rising rapidly on amiodarone and warfarin, hold warfarin tonight and resume lower dose when INR<2.5. Stop heparin now. Objective:      Vitals:    04/10/17 2224 04/11/17 0133 04/11/17 0459 04/11/17 0727   BP: 107/69 97/67 96/60    Pulse: 92 93 91    Resp:  16 16    Temp:  97.6 °F (36.4 °C) 97.6 °F (36.4 °C)    SpO2:  94% 99% 96%   Weight:   71.4 kg (157 lb 6.4 oz)    Height:   5' 1\" (1.549 m)        Physical Exam:  Neck- supple, no JVD  CV- regular rate and rhythm no MRG  Lung- clear bilaterally, decreased bibasilar  Abd- soft, nontender, nondistended  Ext- trace pitting LE edema  Skin- warm and dry    Data Review:   Recent Labs      04/11/17   0400  04/10/17   0511   NA  140  141   K  4.6  4.4   MG  2.1   --    BUN  39*  39*   CREA  1.97*  2.37*   GLU  134*  92   WBC  7.1   --    HGB  8.7*   --    HCT  27.0*   --    PLT  196   --    INR  3.5*  1.9*       Assessment and Plan:     Principal Problem:  PE (pulmonary thromboembolism) ([facility] [address] (4/4/2017)- converted to coumadin given renal failure- INR 3.5 today, stop heparin, hold coumadin tonight then restart at 2.5mg QHS when INR<2.5. Recheck INR daily      Active Problems:  Asthma (7/1/2015)      Atrial fibrillation ([facility] [address] (4/4/2017)- stopped Lisinopril and HCTZ for increased BP and ability to add BB, started Lopressor and currently cannot tolerate any more BB or CCB therapy- HR well controlled at present, asymptomatic with persistent atypical flutter.    Decrease amio to 200mg BID today, follow clinically      Pleural effusion (4/4/2017)- surveillance per pulmonary, comfortable at rest      Debility (4/4/2017)- rehab/NHP at discharge, per primary MD's      Hypoxemia (4/4/2017)- on O2 per pulmonary    RUT Rubio MD  [facility] Cardiology  Pager [phone]

## 2017-04-11 NOTE — PROGRESS NOTES
IDALIA spoke with Sweetie RN liaison, Pearl Sosa. Should have insurance precert for rehab by Wednesday or Thursday. Family aware and in agreement with plan.

## 2017-04-11 NOTE — PROGRESS NOTES
Verbal bedside report received from Reji Michelle RN. Assumed care of patient. Heparin IV drip verified at bedside with outgoing RN.

## 2017-04-12 LAB
ANION GAP BLD CALC-SCNC: 9 MMOL/L (ref 7–16)
BUN SERPL-MCNC: 34 MG/DL (ref 8–23)
CALCIUM SERPL-MCNC: 8.8 MG/DL (ref 8.3–10.4)
CHLORIDE SERPL-SCNC: 106 MMOL/L (ref 98–107)
CO2 SERPL-SCNC: 25 MMOL/L (ref 21–32)
COLLECTION COMMENT, COLCM: NORMAL
CREAT SERPL-MCNC: 1.56 MG/DL (ref 0.6–1)
ERYTHROCYTE [DISTWIDTH] IN BLOOD BY AUTOMATED COUNT: 14.5 % (ref 11.9–14.6)
GLUCOSE BLD STRIP.AUTO-MCNC: 116 MG/DL (ref 65–100)
GLUCOSE BLD STRIP.AUTO-MCNC: 150 MG/DL (ref 65–100)
GLUCOSE BLD STRIP.AUTO-MCNC: 153 MG/DL (ref 65–100)
GLUCOSE BLD STRIP.AUTO-MCNC: 171 MG/DL (ref 65–100)
GLUCOSE SERPL-MCNC: 114 MG/DL (ref 65–100)
HCT VFR BLD AUTO: 24.7 % (ref 35.8–46.3)
HCT VFR BLD AUTO: 24.9 % (ref 35.8–46.3)
HCT VFR BLD AUTO: 25.2 % (ref 35.8–46.3)
HCT VFR BLD AUTO: 25.4 % (ref 35.8–46.3)
HGB BLD-MCNC: 7.9 G/DL (ref 11.7–15.4)
HGB BLD-MCNC: 8 G/DL (ref 11.7–15.4)
HGB BLD-MCNC: 8.1 G/DL (ref 11.7–15.4)
HGB BLD-MCNC: 8.2 G/DL (ref 11.7–15.4)
INR PPP: 6 (ref 0.9–1.2)
MCH RBC QN AUTO: 32.1 PG (ref 26.1–32.9)
MCHC RBC AUTO-ENTMCNC: 31.7 G/DL (ref 31.4–35)
MCV RBC AUTO: 101.2 FL (ref 79.6–97.8)
PLATELET # BLD AUTO: 187 K/UL (ref 150–450)
PMV BLD AUTO: 9.3 FL (ref 10.8–14.1)
POTASSIUM SERPL-SCNC: 4.4 MMOL/L (ref 3.5–5.1)
PROTHROMBIN TIME: 65.7 SEC (ref 9.6–12)
RBC # BLD AUTO: 2.49 M/UL (ref 4.05–5.25)
SODIUM SERPL-SCNC: 140 MMOL/L (ref 136–145)
WBC # BLD AUTO: 7.3 K/UL (ref 4.3–11.1)

## 2017-04-12 PROCEDURE — 77010033678 HC OXYGEN DAILY

## 2017-04-12 PROCEDURE — 74011250637 HC RX REV CODE- 250/637: Performed by: INTERNAL MEDICINE

## 2017-04-12 PROCEDURE — 94640 AIRWAY INHALATION TREATMENT: CPT

## 2017-04-12 PROCEDURE — 80048 BASIC METABOLIC PNL TOTAL CA: CPT | Performed by: PHYSICIAN ASSISTANT

## 2017-04-12 PROCEDURE — 94760 N-INVAS EAR/PLS OXIMETRY 1: CPT

## 2017-04-12 PROCEDURE — 99232 SBSQ HOSP IP/OBS MODERATE 35: CPT | Performed by: INTERNAL MEDICINE

## 2017-04-12 PROCEDURE — 74011636637 HC RX REV CODE- 636/637: Performed by: INTERNAL MEDICINE

## 2017-04-12 PROCEDURE — 65660000000 HC RM CCU STEPDOWN

## 2017-04-12 PROCEDURE — 36415 COLL VENOUS BLD VENIPUNCTURE: CPT | Performed by: PHYSICIAN ASSISTANT

## 2017-04-12 PROCEDURE — 82962 GLUCOSE BLOOD TEST: CPT

## 2017-04-12 PROCEDURE — 97530 THERAPEUTIC ACTIVITIES: CPT

## 2017-04-12 PROCEDURE — 85018 HEMOGLOBIN: CPT | Performed by: INTERNAL MEDICINE

## 2017-04-12 PROCEDURE — 85027 COMPLETE CBC AUTOMATED: CPT | Performed by: PHYSICIAN ASSISTANT

## 2017-04-12 PROCEDURE — 74011000250 HC RX REV CODE- 250: Performed by: INTERNAL MEDICINE

## 2017-04-12 PROCEDURE — 85610 PROTHROMBIN TIME: CPT | Performed by: PHYSICIAN ASSISTANT

## 2017-04-12 RX ORDER — PHYTONADIONE 5 MG/1
2.5 TABLET ORAL
Status: COMPLETED | OUTPATIENT
Start: 2017-04-12 | End: 2017-04-12

## 2017-04-12 RX ADMIN — Medication 10 ML: at 21:37

## 2017-04-12 RX ADMIN — PANTOPRAZOLE SODIUM 40 MG: 40 TABLET, DELAYED RELEASE ORAL at 08:10

## 2017-04-12 RX ADMIN — DOCUSATE SODIUM 100 MG: 100 CAPSULE, LIQUID FILLED ORAL at 17:06

## 2017-04-12 RX ADMIN — ATORVASTATIN CALCIUM 40 MG: 40 TABLET, FILM COATED ORAL at 21:37

## 2017-04-12 RX ADMIN — METOPROLOL TARTRATE 25 MG: 25 TABLET, FILM COATED ORAL at 21:30

## 2017-04-12 RX ADMIN — Medication 10 ML: at 05:41

## 2017-04-12 RX ADMIN — AMIODARONE HYDROCHLORIDE 200 MG: 200 TABLET ORAL at 08:56

## 2017-04-12 RX ADMIN — METOPROLOL TARTRATE 25 MG: 25 TABLET, FILM COATED ORAL at 12:06

## 2017-04-12 RX ADMIN — METOPROLOL TARTRATE 25 MG: 25 TABLET, FILM COATED ORAL at 08:10

## 2017-04-12 RX ADMIN — HUMAN INSULIN 2 UNITS: 100 INJECTION, SOLUTION SUBCUTANEOUS at 17:07

## 2017-04-12 RX ADMIN — DOCUSATE SODIUM 100 MG: 100 CAPSULE, LIQUID FILLED ORAL at 08:56

## 2017-04-12 RX ADMIN — LORATADINE 10 MG: 10 TABLET ORAL at 08:56

## 2017-04-12 RX ADMIN — LEVALBUTEROL HYDROCHLORIDE 0.63 MG: 0.63 SOLUTION RESPIRATORY (INHALATION) at 14:10

## 2017-04-12 RX ADMIN — NIACIN 500 MG: 500 TABLET, FILM COATED, EXTENDED RELEASE ORAL at 21:37

## 2017-04-12 RX ADMIN — HUMAN INSULIN 2 UNITS: 100 INJECTION, SOLUTION SUBCUTANEOUS at 21:35

## 2017-04-12 RX ADMIN — AMIODARONE HYDROCHLORIDE 200 MG: 200 TABLET ORAL at 21:36

## 2017-04-12 RX ADMIN — PHYTONADIONE 2.5 MG: 5 TABLET ORAL at 11:55

## 2017-04-12 RX ADMIN — LEVALBUTEROL HYDROCHLORIDE 0.63 MG: 0.63 SOLUTION RESPIRATORY (INHALATION) at 01:58

## 2017-04-12 RX ADMIN — HUMAN INSULIN 2 UNITS: 100 INJECTION, SOLUTION SUBCUTANEOUS at 11:55

## 2017-04-12 RX ADMIN — LEVALBUTEROL HYDROCHLORIDE 0.63 MG: 0.63 SOLUTION RESPIRATORY (INHALATION) at 20:05

## 2017-04-12 RX ADMIN — Medication 5 ML: at 14:45

## 2017-04-12 RX ADMIN — METOPROLOL TARTRATE 25 MG: 25 TABLET, FILM COATED ORAL at 01:27

## 2017-04-12 NOTE — PROGRESS NOTES
[facility] CARDIOLOGY PROGRESS NOTE    4/12/2017 8:04 AM    Admit Date: 4/4/2017    Admit Diagnosis: PE (pulmonary thromboembolism) (HCC)      Subjective:   Stable overnight without interval angina, CHF, palpitations,  presyncope or syncope. Vitals controlled and tolerating meds well. Await INR from today, a little more anemic today as well but clinically asymptomatic. HR fairly well controlled with persistent atypical flutter on tele. Objective:      Vitals:    04/11/17 2139 04/12/17 0112 04/12/17 0158 04/12/17 0551   BP: 120/64 101/62  107/63   Pulse: 97 98  99   Resp: 18 18  18   Temp: 98.2 °F (36.8 °C) 97.7 °F (36.5 °C)  97.8 °F (36.6 °C)   SpO2: 99% 99% 99% 96%   Weight:    71.8 kg (158 lb 6.4 oz)   Height:           Physical Exam:  Neck- supple, no JVD at 60 deg  CV- increased HR but fairly regular rate and rhythm no MRG  Lung- clear bilaterally anteriorly  Abd- soft, nontender, nondistended  Ext- trace-1+ pitting LE edema  Skin- warm and dry    Data Review:   Recent Labs      04/11/17   0400  04/10/17   0511   NA  140  141   K  4.6  4.4   MG  2.1   --    BUN  39*  39*   CREA  1.97*  2.37*   GLU  134*  92   WBC  7.1   --    HGB  8.7*   --    HCT  27.0*   --    PLT  196   --    INR  3.5*  1.9*       Assessment and Plan:     Principal Problem:  PE (pulmonary thromboembolism) ([facility] [address] (4/4/2017)- converted to coumadin given renal failure- INR 3.5 yesterday, stopped heparin, await INR from this AM, plan to restart coumadin at 2.5mg QHS when INR<2.5. Recheck INR and CBC daily      Active Problems:  Asthma- stable without wheezing or SOB at present- follow clinically      Atrial fibrillation ([facility] [address] (4/4/2017)- stopped Lisinopril and HCTZ for increased BP and ability to add BB, started Lopressor and currently tolerating QID dosing well- HR fairly well controlled at present, asymptomatic with persistent atypical flutter.  Decreased amio to 200mg BID yesterday, follow clinically      Pleural effusion (4/4/2017)- surveillance per pulmonary, comfortable at rest      Debility (4/4/2017)- rehab/NHP at discharge, per primary MD's      Hypoxemia (4/4/2017)- on O2 per pulmonary    ADDENDUM:  INR 6.0 TODAY. ON AMIO AND WARFARIN 5MG, HELD WARFARIN YESTERDAY. ANEMIA WORSE TODAY - CHECK Q6H H/H, TRANSFUSE FOR HGB<8, VIT K 2.5MG X 1 NOW. HOLD COUMADIN UNTIL INR<2.5 THEN RESTART 2MG DAILY.       Luh Asif MD  [facility] Cardiology  Pager [phone]

## 2017-04-12 NOTE — PROGRESS NOTES
Problem: Nutrition Deficit  Goal: *Optimize nutritional status  Nutrition LOS Note: day8  Assessment  Diet order(s): [facility]  Food,Nutrition, and Pertinent History: Patient does not interact with RD, however, patient's daughter at bedside gives RD information. She states that the patient eats well when she likes what she is served. She reports the patient being a \"picky eater. \"  She has been assisting with feeding patient. She does not report any recent weight loss, reporting a good appetite at home. She does not have teeth but is able to chew foods fine, per daughter. Plans to discharge to [facility] tomorrow. Anthropometrics: Height: 5' 1\" (154.9 cm), Weight Source: Standing scale (comment), Weight: 71.8 kg (158 lb 6.4 oz), Body mass index is 29.93 kg/(m^2). BMI class of overweight. RN notes 2+ pitting edema to lower extremities. Macronutrient Needs:  · EER:  954-1193 kcal /day (20-25 kcal/kg IBW)  · EPR:  48-57 grams protein/day (1-1.2 grams/kg IBW)(GFR 33)-SYLVESTER  Intake/Comparative Standards: Average intake for past 8 day(s)/11 recorded meal(s): 60%. This potentially meets ~100% of kcal and ~100% of protein needs     Nutrition Diagnosis: No nutrition diagnosis at this time     Intervention: Meals and snacks: Continue current diet.      Leela Meek [address] [place] [address], [facility] [address], [phone]

## 2017-04-12 NOTE — PROGRESS NOTES
Problem: Falls - Risk of  Goal: *Absence of falls  Outcome: Progressing Towards Goal  Yellow socks on, bed alarm set, family at bed side, call light within reach.

## 2017-04-12 NOTE — PROGRESS NOTES
Bedside report received from [facility]. Pt resting in bed. Family at bedside. WIll monitor.

## 2017-04-12 NOTE — PROGRESS NOTES
Bedside and Verbal shift change report given to self (oncoming nurse) by Olamide Vega RN (offgoing nurse). Report included the following information SBAR, Kardex, MAR and Recent Results.

## 2017-04-12 NOTE — PROGRESS NOTES
Ruth Dawn  Admission Date: 4/4/2017             Daily Progress Note: 4/12/2017    The patient's chart is reviewed and the patient is discussed with the staff. Subjective:     81 yo  female with a history of HTN, HLD, asthma, and DM2. Pt presented to the ER with afib with RVR. She was also found to have elevated ddimer and CTA chest revealed bibasilar pulmonary emboli, small bilateral pleural effusions, pulmonary edema, and large hiatal hernia. Was initially placed on heparin drip and cardiology consulted--placed on amio drip for uncontrolled afib. Palliative Care consulted and DNR status was confirmed. ECHO with reduced EF 30-35%.  Xarelto was added but changed to Coumadin due to acute renal failure    Coumadin is now on hold due to high inr    Current Facility-Administered Medications   Medication Dose Route Frequency    amiodarone (CORDARONE) tablet 200 mg  200 mg Oral Q12H    docusate sodium (COLACE) capsule 100 mg  100 mg Oral BID    LORazepam (ATIVAN) injection 0.5 mg  0.5 mg IntraVENous Q4H PRN    metoprolol tartrate (LOPRESSOR) tablet 25 mg  25 mg Oral Q6H    sodium chloride (OCEAN) 0.65 % nasal spray 1 Spray  1 Spray Both Nostrils PRN    atorvastatin (LIPITOR) tablet 40 mg  40 mg Oral QHS    loratadine (CLARITIN) tablet 10 mg  10 mg Oral DAILY    niacin ER (NIASPAN) tablet 500 mg  500 mg Oral QHS    pantoprazole (PROTONIX) tablet 40 mg  40 mg Oral ACB    sodium chloride (NS) flush 5-10 mL  5-10 mL IntraVENous Q8H    sodium chloride (NS) flush 5-10 mL  5-10 mL IntraVENous PRN    levalbuterol (XOPENEX) nebulizer soln 0.63 mg/3 mL  0.63 mg Nebulization Q6H RT    insulin regular (NOVOLIN R, HUMULIN R) injection   SubCUTAneous AC&HS    acetaminophen (TYLENOL) tablet 650 mg  650 mg Oral Q6H PRN       Review of Systems    Constitutional: negative for fever, chills, sweats  Cardiovascular: negative for chest pain, palpitations, syncope, edema  Gastrointestinal:  negative for dysphagia, reflux, vomiting, diarrhea, abdominal pain, or melena  Neurologic:  negative for focal weakness, numbness, headache    Objective:     Vitals:    04/12/17 0551 04/12/17 0815 04/12/17 1257 04/12/17 1412   BP: 107/63 122/84 100/61    Pulse: 99 (!) 101 97    Resp: 18 20 23    Temp: 97.8 °F (36.6 °C) 98.1 °F (36.7 °C) 98.1 °F (36.7 °C)    SpO2: 96% 100% 100% 99%   Weight: 158 lb 6.4 oz (71.8 kg)      Height:         Intake and Output:   04/10 1901 - 04/12 0700  In: 352 [P.O.:720; I.V.:124]  Out: 300 [Urine:300]       Physical Exam:   Constitution:  the patient is well developed and in no acute distress  EENMT:  Sclera clear, pupils equal, oral mucosa moist  Respiratory:  clear  Cardiovascular:  RRR without M,G,R  Gastrointestinal: soft and non-tender; with positive bowel sounds. Musculoskeletal: warm without cyanosis. There is no lower leg edema. Skin:  no jaundice or rashes, no wounds   Neurologic: no gross neuro deficits     Psychiatric:  alert and oriented x 3    CHEST XRAY:       LAB  Recent Labs      04/12/17   1110  04/12/17   0706  04/11/17   2136  04/11/17   1729  04/11/17   1152   GLUCPOC  171*  116*  154*  148*  147*      Recent Labs      04/12/17   1350  04/12/17   0828  04/11/17   0400  04/10/17   0511   WBC   --   7.3  7.1   --    HGB  8.2*  8.0*  8.7*   --    HCT  25.4*  25.2*  27.0*   --    PLT   --   187  196   --    INR   --   6.0*  3.5*  1.9*     Recent Labs      04/12/17   0828  04/11/17   0400  04/10/17   0511   NA  140  140  141   K  4.4  4.6  4.4   CL  106  106  107   CO2  25  26  23   GLU  114*  134*  92   BUN  34*  39*  39*   CREA  1.56*  1.97*  2.37*   MG   --   2.1   --    CA  8.8  8.9  8.9     No results for input(s): PH, PCO2, PO2, HCO3 in the last 72 hours. No results for input(s): LCAD, LAC in the last 72 hours.       Assessment:  (Medical Decision Making)     Hospital Problems  Date Reviewed: 4/10/2017          Codes Class Noted POA Abdominal pain ICD-10-CM: R10.9  ICD-9-CM: 789.00  4/9/2017 No        Acute renal failure (ARF) (HCC) ICD-10-CM: N17.9  ICD-9-CM: 584.9  4/7/2017 No    better    Do not resuscitate (Chronic) ICD-10-CM: Z66  ICD-9-CM: V49.86 Chronic 4/7/2017 Yes        Asthma (Chronic) ICD-10-CM: J45.909  ICD-9-CM: 493.90  4/4/2017 Yes        * (Principal)PE (pulmonary thromboembolism) ([facility] [address] ICD-10-CM: I26.99  ICD-9-CM: 415.19  4/4/2017 Yes    Heparin -stopped, coumadin on hold    Atrial fibrillation (HCC) ICD-10-CM: I48.91  ICD-9-CM: 427.31  4/4/2017 Yes        Pleural effusion ICD-10-CM: J90  ICD-9-CM: 511.9  4/4/2017 Yes    Small noted on admit    Debility ICD-10-CM: R53.81  ICD-9-CM: 799.3  4/4/2017 Yes    Needs rehab    Hypoxemia ICD-10-CM: R09.02  ICD-9-CM: 799.02  4/4/2017 Yes    On nc          Plan:  (Medical Decision Making)   1   To rehab tomorrow  2   Hold on coumadin- follow up inr  --    More than 50% of the time documented was spent in face-to-face contact with the patient and in the care of the patient on the floor/unit where the patient is located.     Maxim Kwok MD

## 2017-04-12 NOTE — PROGRESS NOTES
Problem: Mobility Impaired (Adult and Pediatric)  Goal: *Acute Goals and Plan of Care (Insert Text)  LTG:  (1.)Ms. Oc Murray will move from supine to sit and sit to supine , scoot up and down and roll side to side in bed with STAND BY ASSIST within 7 day(s). (2.)Ms. Oc Murray will transfer from bed to chair and chair to bed with STAND BY ASSIST using the least restrictive device within 7 day(s). (3.)Ms. Oc Murray will ambulate with CONTACT GUARD ASSIST for 250 feet with the least restrictive device within 7 day(s). (4.)Ms. Oc Murray will negotiate up/down 2 steps with with CONTACT GUARD ASSIST within 7 days. ________________________________________________________________________________________________      PHYSICAL THERAPY: Daily Note, Treatment Day: 4th and PM 4/12/2017  INPATIENT: Hospital Day: 9  Payor: [employer]US / [place] [employer] [place] / Product Type: Managed Care Medicare /      NAME/AGE/GENDER: Rebecca Meyers is a 80 y.o. female  PRIMARY DIAGNOSIS: PE (pulmonary thromboembolism) ([facility] [address] PE (pulmonary thromboembolism) ([facility] [address] PE (pulmonary thromboembolism) ([facility] [address]        ICD-10: Treatment Diagnosis:       · Generalized Muscle Weakness (M62.81)  · Other lack of cordination (R27.8)  · Difficulty in walking, Not elsewhere classified (R26.2)   Precaution/Allergies:  Review of patient's allergies indicates no known allergies. ASSESSMENT:      Ms. Oc Murray presents supine in bed  with daughter present. Agreeable to PT treatment. Bed mobility is modified independent. Sits to EOB with with supervision with some additional time for task completion. O2 intact and no SOB noted. While sitting edge of bed the patient participated in therapeutic exercises without difficulty. Patient encouraged in OOB activities to increase tolerance and stability. Possible discharge tomorrow to rehab (which would be very helpful as patient states that she was totally independent).   Returned to supine. Needs in reach. No progress this session. Will continue POC and PT efforts. This section established at most recent assessment   PROBLEM LIST (Impairments causing functional limitations):  1. Decreased Strength  2. Decreased ADL/Functional Activities  3. Decreased Transfer Abilities  4. Decreased Ambulation Ability/Technique  5. Decreased Balance  6. Decreased Activity Tolerance  7. Increased Fatigue  8. Increased Shortness of Breath    INTERVENTIONS PLANNED: (Benefits and precautions of physical therapy have been discussed with the patient.)  1. Balance Exercise  2. Bed Mobility  3. Family Education  4. Gait Training  5. Therapeutic Activites  6. Therapeutic Exercise/Strengthening  7. Transfer Training  8. Group Therapy      TREATMENT PLAN: Frequency/Duration: 3 times a week for duration of hospital stay  Rehabilitation Potential For Stated Goals: FAIR      RECOMMENDED REHABILITATION/EQUIPMENT: (at time of discharge pending progress): Continue Skilled Therapy and Rehab. HISTORY:   History of Present Injury/Illness (Reason for Referral):  Per H&P, \"Patient is a 80 y.o.  female presents with SOB and R shoulder pain. She went to see her PCP yesterday and she was told she had something wrong with her heart and she was place don las. She has LE edema. She denies chest pain. She presented with A fib qith RVR and also PE. I was asked to admit her. \"  Past Medical History/Comorbidities:   Ms. Neyda Robles  has a past medical history of Asthma (7/1/2015); Heartburn (7/1/2015); Hyperlipidemia (7/1/2015); Hypertension (7/1/2015); Type 2 diabetes mellitus ([facility] [address] and Urge incontinence (7/1/2015). Ms. Neyda Robles  has a past surgical history that includes cataract removal (2011).   Social History/Living Environment:   Home Environment: Private residence  # Steps to Enter: 3  One/Two Story Residence: One story  Living Alone:  (moving in with daughter)  Support Systems: Family member(s)  Patient Expects to be Discharged to[de-identified] Private residence  Current DME Used/Available at Home: Cane, quad, Grab bars  Tub or Shower Type: Tub/Shower combination  Prior Level of Function/Work/Activity:  Independent. Some assistance with mobility. Uses cane. Number of Personal Factors/Comorbidities that affect the Plan of Care:  Age  Decreased insight into deficits  Unsteady on feet  SOB/hx of PE 3+: HIGH COMPLEXITY   EXAMINATION:   Most Recent Physical Functioning:   Gross Assessment:                  Posture:     Balance:  Sitting: Intact  Sitting - Static: Good (unsupported)  Sitting - Dynamic: Good (unsupported)  Standing:  (n/a) Bed Mobility:  Rolling: Modified independent  Supine to Sit: Modified independent  Sit to Supine: Modified independent  Scooting: Modified independent  Wheelchair Mobility:     Transfers:     Gait:             Body Structures Involved:  1. Lungs Body Functions Affected:  1. Respiratory  2. Neuromusculoskeletal  3. Movement Related Activities and Participation Affected:  1. General Tasks and Demands  2. Mobility  3. Self Care  4. Domestic Life  5. Community, Social and [place]   Number of elements that affect the Plan of Care: 4+: HIGH COMPLEXITY   CLINICAL PRESENTATION:   Presentation: Stable and uncomplicated: LOW COMPLEXITY   CLINICAL DECISION MAKIN [facility] Mobility Inpatient Short Form  How much difficulty does the patient currently have. .. Unable A Lot A Little None   1. Turning over in bed (including adjusting bedclothes, sheets and blankets)? [ ] 1   [ ] 2   [ ] 3   [X] 4   2. Sitting down on and standing up from a chair with arms ( e.g., wheelchair, bedside commode, etc.)   [ ] 1   [ ] 2   [ ] 3   [X] 4   3. Moving from lying on back to sitting on the side of the bed? [ ] 1   [ ] 2   [ ] 3   [X] 4   How much help from another person does the patient currently need. .. Total A Lot A Little None   4.   Moving to and from a bed to a chair (including a wheelchair)? [ ] 1   [X] 2   [ ] 3   [ ] 4   5. Need to walk in hospital room? [ ] 1   [X] 2   [ ] 3   [ ] 4   6. Climbing 3-5 steps with a railing? [X] 1   [ ] 2   [ ] 3   [ ] 4   © 2007, Trustees of [address] [place] [address] [address] [address], under license to [employer]. All rights reserved    Score:  Initial: 17 Most Recent: X (Date: 4/5/17 )     Interpretation of Tool:  Represents activities that are increasingly more difficult (i.e. Bed mobility, Transfers, Gait). Score 24 23 22-20 19-15 14-10 9-7 6       Modifier CH CI CJ CK CL CM CN         · Mobility - Walking and Moving Around:               - CURRENT STATUS:    CK - 40%-59% impaired, limited or restricted               - GOAL STATUS:           CJ - 20%-39% impaired, limited or restricted               - D/C STATUS:                       ---------------To be determined---------------  Payor: [employer]US / [place] [employer] [place] / Product Type: Managed Care Medicare /       Medical Necessity:     · Patient is expected to demonstrate progress in strength, balance, coordination and functional technique to improve safety during bed mobility, transfers, and ambulation. Reason for Services/Other Comments:  · Patient continues to require present interventions due to patient's inability to safely transfer and ambulate with assistive device. Use of outcome tool(s) and clinical judgement create a POC that gives a: Questionable prediction of patient's progress: MODERATE COMPLEXITY                 TREATMENT:      Pre-treatment Symptoms/Complaints:  \"OK\"  Pain: Initial:     Pain Intensity 1: 0  Post Session:  Voices no c/o      Therapeutic Activity: (   29 minutes) Therapeutic activities including bed mobility, Ambulation on level ground, sit to stand transfers, walker safety to improve mobility, strength, balance and coordination.   Required supervision and verbal cues    to promote static and dynamic balance in standing and promote coordination of bilateral, lower extremity(s). Braces/Orthotics/Lines/Etc:   · IV and NC 1L  Treatment/Session Assessment:    · Response to Treatment: Tolerated well  · Interdisciplinary Collaboration:  · Physical Therapy Assistant and Registered Nurse  · After treatment position/precautions:  · Supine in bed, Bed alarm/tab alert on, Bed/Chair-wheels locked, Bed in low position, Call light within reach, RN notified and Family at bedside  · Compliance with Program/Exercises: Will assess as treatment progresses. · Recommendations/Intent for next treatment session: \"Next visit will focus on advancements to more challenging activities and reduction in assistance provided\".   Total Treatment Duration:  PT Patient Time In/Time Out  Time In: 1615  Time Out: [address]     Refugio Sheldon, PTA

## 2017-04-13 LAB
ANION GAP BLD CALC-SCNC: 3 MMOL/L (ref 7–16)
BUN SERPL-MCNC: 35 MG/DL (ref 8–23)
CALCIUM SERPL-MCNC: 8.5 MG/DL (ref 8.3–10.4)
CHLORIDE SERPL-SCNC: 107 MMOL/L (ref 98–107)
CO2 SERPL-SCNC: 31 MMOL/L (ref 21–32)
CREAT SERPL-MCNC: 1.46 MG/DL (ref 0.6–1)
ERYTHROCYTE [DISTWIDTH] IN BLOOD BY AUTOMATED COUNT: 14.6 % (ref 11.9–14.6)
GLUCOSE BLD STRIP.AUTO-MCNC: 100 MG/DL (ref 65–100)
GLUCOSE BLD STRIP.AUTO-MCNC: 134 MG/DL (ref 65–100)
GLUCOSE BLD STRIP.AUTO-MCNC: 149 MG/DL (ref 65–100)
GLUCOSE BLD STRIP.AUTO-MCNC: 193 MG/DL (ref 65–100)
GLUCOSE SERPL-MCNC: 100 MG/DL (ref 65–100)
HCT VFR BLD AUTO: 23.7 % (ref 35.8–46.3)
HCT VFR BLD AUTO: 29.9 % (ref 35.8–46.3)
HGB BLD-MCNC: 7.7 G/DL (ref 11.7–15.4)
HGB BLD-MCNC: 9.8 G/DL (ref 11.7–15.4)
INR PPP: 2.2 (ref 0.9–1.2)
MCH RBC QN AUTO: 32.2 PG (ref 26.1–32.9)
MCHC RBC AUTO-ENTMCNC: 32.5 G/DL (ref 31.4–35)
MCV RBC AUTO: 99.2 FL (ref 79.6–97.8)
PLATELET # BLD AUTO: 182 K/UL (ref 150–450)
PMV BLD AUTO: 9.2 FL (ref 10.8–14.1)
POTASSIUM SERPL-SCNC: 4.6 MMOL/L (ref 3.5–5.1)
PROTHROMBIN TIME: 24.6 SEC (ref 9.6–12)
RBC # BLD AUTO: 2.39 M/UL (ref 4.05–5.25)
SODIUM SERPL-SCNC: 141 MMOL/L (ref 136–145)
WBC # BLD AUTO: 7.3 K/UL (ref 4.3–11.1)

## 2017-04-13 PROCEDURE — 74011000250 HC RX REV CODE- 250: Performed by: INTERNAL MEDICINE

## 2017-04-13 PROCEDURE — 74011250637 HC RX REV CODE- 250/637: Performed by: INTERNAL MEDICINE

## 2017-04-13 PROCEDURE — 80048 BASIC METABOLIC PNL TOTAL CA: CPT | Performed by: PHYSICIAN ASSISTANT

## 2017-04-13 PROCEDURE — 94760 N-INVAS EAR/PLS OXIMETRY 1: CPT

## 2017-04-13 PROCEDURE — 85018 HEMOGLOBIN: CPT | Performed by: PHYSICIAN ASSISTANT

## 2017-04-13 PROCEDURE — 86923 COMPATIBILITY TEST ELECTRIC: CPT | Performed by: INTERNAL MEDICINE

## 2017-04-13 PROCEDURE — 36415 COLL VENOUS BLD VENIPUNCTURE: CPT | Performed by: PHYSICIAN ASSISTANT

## 2017-04-13 PROCEDURE — 85027 COMPLETE CBC AUTOMATED: CPT | Performed by: PHYSICIAN ASSISTANT

## 2017-04-13 PROCEDURE — 82962 GLUCOSE BLOOD TEST: CPT

## 2017-04-13 PROCEDURE — 30233N1 TRANSFUSION OF NONAUTOLOGOUS RED BLOOD CELLS INTO PERIPHERAL VEIN, PERCUTANEOUS APPROACH: ICD-10-PCS | Performed by: INTERNAL MEDICINE

## 2017-04-13 PROCEDURE — 85610 PROTHROMBIN TIME: CPT | Performed by: PHYSICIAN ASSISTANT

## 2017-04-13 PROCEDURE — 94640 AIRWAY INHALATION TREATMENT: CPT

## 2017-04-13 PROCEDURE — 77030013131 HC IV BLD ST ICUM -A

## 2017-04-13 PROCEDURE — 74011636637 HC RX REV CODE- 636/637: Performed by: INTERNAL MEDICINE

## 2017-04-13 PROCEDURE — 86900 BLOOD TYPING SEROLOGIC ABO: CPT | Performed by: INTERNAL MEDICINE

## 2017-04-13 PROCEDURE — P9016 RBC LEUKOCYTES REDUCED: HCPCS | Performed by: INTERNAL MEDICINE

## 2017-04-13 PROCEDURE — 65660000000 HC RM CCU STEPDOWN

## 2017-04-13 PROCEDURE — 92610 EVALUATE SWALLOWING FUNCTION: CPT

## 2017-04-13 PROCEDURE — 99232 SBSQ HOSP IP/OBS MODERATE 35: CPT | Performed by: INTERNAL MEDICINE

## 2017-04-13 PROCEDURE — 36430 TRANSFUSION BLD/BLD COMPNT: CPT

## 2017-04-13 RX ORDER — WARFARIN 2 MG/1
2 TABLET ORAL
Status: DISCONTINUED | OUTPATIENT
Start: 2017-04-14 | End: 2017-04-14

## 2017-04-13 RX ORDER — WARFARIN 2.5 MG/1
2.5 TABLET ORAL
Status: DISCONTINUED | OUTPATIENT
Start: 2017-04-13 | End: 2017-04-13

## 2017-04-13 RX ORDER — WARFARIN 2 MG/1
2 TABLET ORAL
Status: DISCONTINUED | OUTPATIENT
Start: 2017-04-13 | End: 2017-04-13

## 2017-04-13 RX ORDER — SODIUM CHLORIDE 9 MG/ML
250 INJECTION, SOLUTION INTRAVENOUS AS NEEDED
Status: DISCONTINUED | OUTPATIENT
Start: 2017-04-13 | End: 2017-04-14 | Stop reason: HOSPADM

## 2017-04-13 RX ADMIN — NIACIN 500 MG: 500 TABLET, FILM COATED, EXTENDED RELEASE ORAL at 22:32

## 2017-04-13 RX ADMIN — DOCUSATE SODIUM 100 MG: 100 CAPSULE, LIQUID FILLED ORAL at 09:43

## 2017-04-13 RX ADMIN — METOPROLOL TARTRATE 25 MG: 25 TABLET, FILM COATED ORAL at 08:12

## 2017-04-13 RX ADMIN — Medication 5 ML: at 22:33

## 2017-04-13 RX ADMIN — LEVALBUTEROL HYDROCHLORIDE 0.63 MG: 0.63 SOLUTION RESPIRATORY (INHALATION) at 19:16

## 2017-04-13 RX ADMIN — LEVALBUTEROL HYDROCHLORIDE 0.63 MG: 0.63 SOLUTION RESPIRATORY (INHALATION) at 08:42

## 2017-04-13 RX ADMIN — AMIODARONE HYDROCHLORIDE 200 MG: 200 TABLET ORAL at 22:32

## 2017-04-13 RX ADMIN — AMIODARONE HYDROCHLORIDE 200 MG: 200 TABLET ORAL at 09:42

## 2017-04-13 RX ADMIN — LEVALBUTEROL HYDROCHLORIDE 0.63 MG: 0.63 SOLUTION RESPIRATORY (INHALATION) at 01:30

## 2017-04-13 RX ADMIN — PANTOPRAZOLE SODIUM 40 MG: 40 TABLET, DELAYED RELEASE ORAL at 08:12

## 2017-04-13 RX ADMIN — Medication 10 ML: at 13:15

## 2017-04-13 RX ADMIN — LORATADINE 10 MG: 10 TABLET ORAL at 09:42

## 2017-04-13 RX ADMIN — METOPROLOL TARTRATE 25 MG: 25 TABLET, FILM COATED ORAL at 18:22

## 2017-04-13 RX ADMIN — HUMAN INSULIN 2 UNITS: 100 INJECTION, SOLUTION SUBCUTANEOUS at 22:32

## 2017-04-13 RX ADMIN — METOPROLOL TARTRATE 25 MG: 25 TABLET, FILM COATED ORAL at 13:15

## 2017-04-13 RX ADMIN — DOCUSATE SODIUM 100 MG: 100 CAPSULE, LIQUID FILLED ORAL at 17:49

## 2017-04-13 RX ADMIN — ATORVASTATIN CALCIUM 40 MG: 40 TABLET, FILM COATED ORAL at 22:32

## 2017-04-13 NOTE — PROGRESS NOTES
Bedside and Verbal shift change report given to Dipak Officer, RN (oncoming nurse) by self Huy alonso. Report included the following information SBAR, Kardex, MAR and Recent Results.

## 2017-04-13 NOTE — PROGRESS NOTES
Bedside and Verbal shift change report given to [facility]. Report included the following information SBAR, Kardex, MAR, Accordion and Recent Results. Blood Transfusion complete.

## 2017-04-13 NOTE — PROGRESS NOTES
Myra Berg  Admission Date: 4/4/2017             Daily Progress Note: 4/13/2017    The patient's chart is reviewed and the patient is discussed with the staff. 79 yo  female with a history of HTN, HLD, asthma, and DM2. Pt presented to the ER with afib with RVR. She was also found to have elevated ddimer and CTA chest revealed bibasilar pulmonary emboli, small bilateral pleural effusions, pulmonary edema, and large hiatal hernia. Was initially placed on heparin drip and cardiology consulted--placed on amio drip for uncontrolled afib. Palliative Care consulted and DNR status was confirmed. ECHO with reduced EF 30-35%. Xarelto was added but changed to Coumadin due to acute renal failure  Subjective:     Pt lying in bed on RA. Pt asleep throughout exam.   Pt had INR 6 yesterday and anemia.  H&H checked every 6 hours and noted to be 7.7 this AM. Pt transfused 1 unit PRBC this AM.     Current Facility-Administered Medications   Medication Dose Route Frequency    0.9% sodium chloride infusion 250 mL  250 mL IntraVENous PRN    amiodarone (CORDARONE) tablet 200 mg  200 mg Oral Q12H    docusate sodium (COLACE) capsule 100 mg  100 mg Oral BID    LORazepam (ATIVAN) injection 0.5 mg  0.5 mg IntraVENous Q4H PRN    metoprolol tartrate (LOPRESSOR) tablet 25 mg  25 mg Oral Q6H    sodium chloride (OCEAN) 0.65 % nasal spray 1 Spray  1 Spray Both Nostrils PRN    atorvastatin (LIPITOR) tablet 40 mg  40 mg Oral QHS    loratadine (CLARITIN) tablet 10 mg  10 mg Oral DAILY    niacin ER (NIASPAN) tablet 500 mg  500 mg Oral QHS    pantoprazole (PROTONIX) tablet 40 mg  40 mg Oral ACB    sodium chloride (NS) flush 5-10 mL  5-10 mL IntraVENous Q8H    sodium chloride (NS) flush 5-10 mL  5-10 mL IntraVENous PRN    levalbuterol (XOPENEX) nebulizer soln 0.63 mg/3 mL  0.63 mg Nebulization Q6H RT    insulin regular (NOVOLIN R, HUMULIN R) injection   SubCUTAneous AC&HS    acetaminophen (TYLENOL) tablet 650 mg  650 mg Oral Q6H PRN       Review of Systems  Unobtainable due to patient status-slept through interview. Objective:     Vitals:    04/13/17 0610 04/13/17 0720 04/13/17 0818 04/13/17 0844   BP: 110/64 117/60 123/66    Pulse: 100 100 (!) 102    Resp: 16 16 17    Temp: 97.7 °F (36.5 °C) 97.9 °F (36.6 °C) 97.9 °F (36.6 °C)    SpO2:   95% 95%   Weight:       Height:         Intake and Output:   04/11 1901 - 04/13 0700  In: 1260 [P.O.:1260]  Out: 200 [Urine:200]  04/13 0701 - 04/13 1900  In: 356.3   Out: -     Physical Exam:   Constitution:  the patient is well developed and in no acute distress, on RA  EENMT:  Sclera clear, pupils equal, oral mucosa moist  Respiratory: fairly clear  Cardiovascular:  RRR without M,G,R  Gastrointestinal: soft and non-tender; with positive bowel sounds. Musculoskeletal: warm without cyanosis. There is no lower leg edema. Skin:  no jaundice or rashes  Neurologic: no gross neuro deficits     Psychiatric:  alert and oriented x 3    CHEST XRAY: none today      LAB  Recent Labs      04/13/17   0632  04/12/17   2111  04/12/17   1703  04/12/17   1110  04/12/17   0706   GLUCPOC  100  153*  150*  171*  116*      Recent Labs      04/13/17   0155  04/12/17   2250  04/12/17   1505  04/12/17   1350  04/12/17   0828  04/11/17   0400   WBC  7.3   --    --    --   7.3  7.1   HGB  7.7*  7.9*  8.1*  8.2*  8.0*  8.7*   HCT  23.7*  24.7*  24.9*  25.4*  25.2*  27.0*   PLT  182   --    --    --   187  196   INR  2.2*   --    --    --   6.0*  3.5*     Recent Labs      04/13/17   0155  04/12/17   0828  04/11/17   0400   NA  141  140  140   K  4.6  4.4  4.6   CL  107  106  106   CO2  31  25  26   GLU  100  114*  134*   BUN  35*  34*  39*   CREA  1.46*  1.56*  1.97*   MG   --    --   2.1   CA  8.5  8.8  8.9     No results for input(s): PH, PCO2, PO2, HCO3 in the last 72 hours. No results for input(s): LCAD, LAC in the last 72 hours.       Assessment:  (Medical Decision Making)     Hospital Problems  Date Reviewed: 4/10/2017          Codes Class Noted POA    Abdominal pain-resolved ICD-10-CM: R10.9  ICD-9-CM: 789.00  4/9/2017 No        Acute renal failure (ARF) (HCC)-improving  ICD-10-CM: N17.9  ICD-9-CM: 584.9  4/7/2017 No        Do not resuscitate (Chronic)-chronic  ICD-10-CM: Z66  ICD-9-CM: V49.86 Chronic 4/7/2017 Yes        Asthma (Chronic)- ICD-10-CM: J45.909  ICD-9-CM: 493.90  4/4/2017 Yes        * (Principal)PE (pulmonary thromboembolism) (HCC)-on Coumadin, INR 2.2 today after vitamin K ICD-10-CM: I26.99  ICD-9-CM: 415.19  4/4/2017 Yes        Atrial fibrillation (HCC)-per cardiology ICD-10-CM: I48.91  ICD-9-CM: 427.31  4/4/2017 Yes        Pleural effusion-diuresed ICD-10-CM: J90  ICD-9-CM: 511.9  4/4/2017 Yes        Debility-chronic  ICD-10-CM: R53.81  ICD-9-CM: 799.3  4/4/2017 Yes        Hypoxemia-resolved ICD-10-CM: R09.02  ICD-9-CM: 799.02  4/4/2017 Yes              Plan:  (Medical Decision Making)     --on RA  --continue xopenex  --continue claritin   --hold discharge until tomorrow given blood transfusion this AM  --resume coumadin at 2.5mg qhs    More than 50% of the time documented was spent in face-to-face contact with the patient and in the care of the patient on the floor/unit where the patient is located. JESUS Byrd      Lungs : decreased effort. No wheezing or rhonchi  Heart S1 and S2 audible, no murmers or rubs appreciated  Other     Nurse reports has been coughing with po intake. Patient and friend acknowledge it and currently. Noted to have no teeth. Changed diet to mechanical soft and will engage speech to make sure not aspirating. I have spoken with and examined the patient. I have reviewed the history, examination, assessment, and plan and agree with the above. Claudetta Amen, MD      This note was signed electronically.

## 2017-04-13 NOTE — PROGRESS NOTES
1 unit PRBCs checked and verified by 2 RNs. Transfusion initiated per MD order for Hgb <8. See Blood administration flowsheet. Family at bedside.

## 2017-04-13 NOTE — PROGRESS NOTES
OT Note:    OT attempted to see patient this morning for therapy. Pt was asleep at this time. Had just finished getting a bath with CNA. OT will re-attempt to see patient at a later date/time.     Thanks,  Emily Guevara

## 2017-04-13 NOTE — PROGRESS NOTES
STG: Pt will tolerate pureed/honey thick liquids without signs/sx of aspiration with 100% accuracy  STG: Pt will participate with modified barium swallow study (MBS) x 1  LTG: Pt will tolerate the least restrictive diet at discharge without respiratory compromise    Speech language pathology: bedside swallow note: Initial Assessment    NAME/AGE/GENDER: Autumn Buckley is a 80 y.o. female  DATE: 4/13/2017  PRIMARY DIAGNOSIS: PE (pulmonary thromboembolism) ([facility] [address]       ICD-10: Treatment Diagnosis: dysphagia; oropharyngeal R13.12  INTERDISCIPLINARY COLLABORATION: Registered Nurse  PRECAUTIONS/ALLERGIES: Review of patient's allergies indicates no known allergies. ASSESSMENT:Based on the objective data described below, Ms. Neyda Robles presents with oropharyngeal dysphagia. RN reports patient had a coughing episode at lunch today. Per daughter at bedside, the patient frequently coughs while eating/drinking and has difficulty chewing as well. Pt has mostly been consuming grits and tomato soup since admission per family. No overt signs/sx of aspiration with tsp amounts of thin liquids. Delayed cough upon inhalation with initial trial of thin liquids from the straw. Patient requires 1:1 feeding assistance. Cough with 1 of 2 trials of thin liquids from the cup. Persistent throat clear and delayed cough with nectar thick liquids. No overt signs/sx of aspiration with honey thick liquids. Requires cues to slow down rate. Tolerated her pills in puree and additional pureed trials without difficulty. She was unable to effectively masticate a very small piece of pear stating \"I can't chew this\" and expelled the bolus. Recommend pureed/honey thick liquids. Medications in puree. Patient is scheduled for discharge to [facility] tomorrow and would benefit from modified barium swallow study (MBS)   for further assessment of pharyngeal swallow prior to discharge.       Patient will benefit from skilled intervention to address the below impairments. ?????? ? ? This section established at most recent assessment??????????  PROBLEM LIST (Impairments causing functional limitations):  1. dysphagia  REHABILITATION POTENTIAL FOR STATED GOALS: Good  PLAN OF CARE:   Patient will benefit from skilled intervention to address the following impairments. RECOMMENDATIONS AND PLANNED INTERVENTIONS (Benefits and precautions of therapy have been discussed with the patient.):  · PO:  Pureed  · Liquids:  honey  MEDICATIONS:  · Crushed in puree  COMPENSATORY STRATEGIES/MODIFICATIONS INCLUDING:  · Small sips and bites  OTHER RECOMMENDATIONS (including follow up treatment recommendations): · Family training/education  · Patient education  · modified barium swallow study  RECOMMENDED DIET MODIFICATIONS DISCUSSED WITH:  · Nursing  · Family  · Patient  FREQUENCY/DURATION: Continue to follow patient 2x a week or until short term goals are met to address above goals. RECOMMENDED REHABILITATION/EQUIPMENT: (at time of discharge pending progress):   Rehab. SUBJECTIVE:   Limited appetite. History of Present Injury/Illness: Ms. Alexandria Aguillon  has a past medical history of Asthma (7/1/2015); Heartburn (7/1/2015); Hyperlipidemia (7/1/2015); Hypertension (7/1/2015); [facility] and Urge incontinence (7/1/2015). She also  has a past surgical history that includes cataract removal (2011). Present Symptoms: cough when drinking  Pain Intensity 1: 0  Pain Location 1: Abdomen  Pain Orientation 1: Right, Mid  Pain Intervention(s) 1: Repositioned  Current Medications:   No current facility-administered medications on file prior to encounter. Current Outpatient Prescriptions on File Prior to Encounter   Medication Sig Dispense Refill    lisinopril (PRINIVIL, ZESTRIL) 20 mg tablet Take 1 Tab by mouth daily. Indications: Hypertension 30 Tab 5    CERTAVITE SENIOR-ANTIOXIDANT 0.4-300-250 mg-mcg-mcg tab Take 1 Tab by mouth daily.  90 Tab 3    hydrochlorothiazide (HYDRODIURIL) 25 mg tablet Take 1 Tab by mouth daily. 90 Tab 3    atorvastatin (LIPITOR) 40 mg tablet Take 1 Tab by mouth nightly. 90 Tab 3    niacin ER (NIASPAN) 500 mg tablet Take 1 Tab by mouth nightly. 90 Tab 3    pantoprazole (PROTONIX) 20 mg tablet Take 1 Tab by mouth daily. Indications: HEARTBURN 90 Tab 2    amLODIPine (NORVASC) 2.5 mg tablet Take 1 Tab by mouth daily. 90 Tab 3    metFORMIN (GLUCOPHAGE) 500 mg tablet Take 1 Tab by mouth two (2) times a day. 60 Tab 11    loratadine (CLARITIN) 10 mg tablet Take 1 Tab by mouth daily. 90 Tab 3    fluticasone-salmeterol (ADVAIR DISKUS) 250-50 mcg/dose diskus inhaler Take 1 Puff by inhalation two (2) times a day. 1 Inhaler 11    acetaminophen (TYLENOL) 650 mg CR tablet Take 1 Tab by mouth every eight (8) hours. 2 TABS 90 Tab 11    ENTERIC COATED ASPIRIN PO Take 81 mg by mouth daily.  CALCIUM PHOSPHATE/VITAMIN D2 (CALCIUM-VITAMIN D2 PO) Take  by mouth three (3) times daily. Current Dietary Status:  Mechanical soft        Social History/Home Situation: home with daughter  Home Environment: Private residence  # Steps to Enter: 3  One/Two Story Residence: One story  Living Alone:  (moving in with daughter)  Support Systems: Family member(s)  Patient Expects to be Discharged to[de-identified] Private residence  Current DME Used/Available at Home: Cane, quad, Grab bars  Tub or Shower Type: Tub/Shower combination  OBJECTIVE:   Respiratory Status:  Room air  0 l/min  CXR Results:POSITIVE BIBASILAR PULMONARY EMBOLI. 2. HEART FAILURE WITH SMALL BILATERAL PLEURAL EFFUSIONS.   3. LARGE HIATAL HERNIA  MRI/CT Results: n/a  Oral Motor Structure/Speech:  Oral-Motor Structure/Motor Speech  Labial: No impairment  Dentition: Edentulous  Oral Hygiene: fair  Lingual: Decreased rate    Cognitive and Communication Status:  Neurologic State: Alert  Orientation Level: Oriented to person;Oriented to place  Cognition: Follows commands  Perception: Appears intact          BEDSIDE SWALLOW EVALUATION  Oral Assessment:  Oral Assessment  Labial: No impairment  Dentition: Edentulous  Oral Hygiene: fair  Lingual: Decreased rate  P.O. Trials:  Patient Position: upright in bed    The patient was given tsp to straw amounts of the following:   Consistency Presented: Thin liquid; Nectar thick liquid;Honey thick liquid;Puree;Mixed consistency; Solid  How Presented: Cup/sip;Spoon;Straw;SLP-fed/presented    ORAL PHASE:  Bolus Acceptance: Impaired  Bolus Formation/Control: Impaired  Propulsion: Delayed (# of seconds)  Type of Impairment: Delayed;Mastication  Oral Residue: None    PHARYNGEAL PHASE:  Initiation of Swallow: Delayed (# of seconds)  Laryngeal Elevation: Decreased  Aspiration Signs/Symptoms: Delayed cough/throat clear; Infiltrate on chest xray;Runny nose;Watery eyes;Clear throat; Change vocal quality  Vocal Quality: No impairment           Pharyngeal Phase Characteristics: Altered vocal quality;Poor endurance;Easily fatigued ; Suspected pharyngeal residue    OTHER OBSERVATIONS:  Rate/bite size: Impaired   Endurance:  Impaired     Tool Used: Dysphagia Outcome and Severity Scale (CHELA)    Score Comments   Normal Diet  [] 7 With no strategies or extra time needed   Functional Swallow  [] 6 May have mild oral or pharyngeal delay       Mild Dysphagia    [] 5 Which may require one diet consistency restricted (those who demonstrate penetration which is entirely cleared on MBS would be included)   Mild-Moderate Dysphagia  [] 4 With 1-2 diet consistencies restricted       Moderate Dysphagia  [x] 3 With 2 or more diet consistencies restricted       Moderately Severe Dysphagia  [] 2 With partial PO strategies (trials with ST only)       Severe Dysphagia  [] 1 With inability to tolerate any PO safely          Score:  Initial: 3 Most Recent: X (Date: -- )   Interpretation of Tool: The Dysphagia Outcome and Severity Scale (CHELA) is a simple, easy-to-use, 7-point scale developed to systematically rate the functional severity of dysphagia based on objective assessment and make recommendations for diet level, independence level, and type of nutrition. Score 7 6 5 4 3 2 1   Modifier CH CI CJ CK CL CM CN   ? Swallowing:     - CURRENT STATUS: CL - 60%-79% impaired, limited or restricted    - GOAL STATUS:  CK - 40%-59% impaired, limited or restricted    - D/C STATUS:  ---------------To be determined---------------  Payor: [employer] VIP CARE PLUS / [place] [employer] CHO[employer] VIP CARE PLUS / Product Type: Managed Care Medicare /     TREATMENT:    (In addition to Assessment/Re-Assessment sessions the following treatments were rendered)  Assessment/Reassessment only, no treatment provided today  MODALITIES:                                                                    ORAL MOTOR  EXERCISES:                                                                                                                                                                      LARYNGEAL / PHARYNGEAL EXERCISES:                                                                                                                                     __________________________________________________________________________________________________  Safety:   After treatment position/precautions:  · RN notified  · Family at bedside  · Upright in Bed  Progression/Medical Necessity:   · Skilled intervention continues to be required due to unable to attend/participate in therapy as expected. Compliance with Program/Exercises: Will assess as treatment progresses. Reason for Continuation of Services/Other Comments:  · Patient continues to require skilled intervention due to patient unable to attend/participate in therapy as expected. Recommendations/Intent for next treatment session: \"Treatment next visit will focus on modified barium swallow study\".     Total Treatment Duration:  Time In: 1315  Time Out: [address] MS, SLP

## 2017-04-13 NOTE — PROGRESS NOTES
Verbal and bedside shift change report received from Komal Garrido, [facility].

## 2017-04-13 NOTE — PROGRESS NOTES
[facility] CARDIOLOGY PROGRESS NOTE    4/13/2017 2:37 PM    Admit Date: 4/4/2017    Admit Diagnosis: PE (pulmonary thromboembolism) ([facility])      Subjective:   Weak, tired, but no angina or CHF. HR stable in persistent flutter, on amio and rate controlling meds. INR very high yesterday, lower today with cessation of coumadin and oral vit K. Transfusing today for worsening anemia, no obvious blood loss source as of yet. Objective:      Vitals:    04/13/17 0720 04/13/17 0818 04/13/17 0844 04/13/17 1155   BP: 117/60 123/66  112/74   Pulse: 100 (!) 102  (!) 102   Resp: 16 17     Temp: 97.9 °F (36.6 °C) 97.9 °F (36.6 °C)  97.8 °F (36.6 °C)   SpO2:  95% 95% 94%   Weight:       Height:           Physical Exam:  Neck- supple, no JVD  CV- regular rate and rhythm no MRG  Lung- clear bilaterally  Abd- soft, nontender, nondistended  Ext- 1+ pitting LE edema  Skin- warm and dry    Data Review:   Recent Labs      04/13/17   0155  04/12/17   2250   04/12/17   0828  04/11/17   0400   NA  141   --    --   140  140   K  4.6   --    --   4.4  4.6   MG   --    --    --    --   2.1   BUN  35*   --    --   34*  39*   CREA  1.46*   --    --   1.56*  1.97*   GLU  100   --    --   114*  134*   WBC  7.3   --    --   7.3  7.1   HGB  7.7*  7.9*   < >  8.0*  8.7*   HCT  23.7*  24.7*   < >  25.2*  27.0*   PLT  182   --    --   187  196   INR  2.2*   --    --   6.0*  3.5*    < > = values in this interval not displayed. Assessment and Plan:     Principal Problem:  PE (pulmonary thromboembolism) ([facility] [address] (4/4/2017)- converted to coumadin given renal failure- INR 6 yesterday, stopped heparin, reversed with low dose oral vit-k and 2.5 today, recheck tomorrow AM, plan to restart coumadin at 2mg QHS when INR<2.3.  Recheck INR and CBC daily- transfuse today, keep Hgb>9-10 if possible      Active Problems:  Asthma- stable without wheezing or SOB at present- follow clinically      Atrial fibrillation ([facility] [address] (4/4/2017)- stopped Lisinopril and HCTZ for increased BP and ability to add BB, started Lopressor and currently tolerating QID dosing well- HR fairly well controlled at present, asymptomatic with persistent atypical flutter. Decreased amio to 200mg BID yesterday, follow clinically      Pleural effusion (4/4/2017)- surveillance per pulmonary, comfortable at rest      Debility (4/4/2017)- rehab/[facility] at discharge, per primary MD's      Hypoxemia (4/4/2017)- on O2 per pulmonary    Transfuse today, recheck BMP and CBC in AM.  Daily INR, coumadin as above.      Nickie Scherer MD  [facility] Cardiology  Pager [phone]

## 2017-04-14 ENCOUNTER — APPOINTMENT (OUTPATIENT)
Dept: GENERAL RADIOLOGY | Age: 82
DRG: 176 | End: 2017-04-14
Attending: INTERNAL MEDICINE
Payer: COMMERCIAL

## 2017-04-14 VITALS
HEART RATE: 100 BPM | SYSTOLIC BLOOD PRESSURE: 113 MMHG | RESPIRATION RATE: 16 BRPM | WEIGHT: 156.8 LBS | TEMPERATURE: 97.6 F | HEIGHT: 61 IN | BODY MASS INDEX: 29.6 KG/M2 | DIASTOLIC BLOOD PRESSURE: 68 MMHG | OXYGEN SATURATION: 100 %

## 2017-04-14 LAB
ABO + RH BLD: NORMAL
ANION GAP BLD CALC-SCNC: 10 MMOL/L (ref 7–16)
BLD PROD TYP BPU: NORMAL
BLOOD GROUP ANTIBODIES SERPL: NORMAL
BPU ID: NORMAL
BUN SERPL-MCNC: 33 MG/DL (ref 8–23)
CALCIUM SERPL-MCNC: 8.5 MG/DL (ref 8.3–10.4)
CHLORIDE SERPL-SCNC: 106 MMOL/L (ref 98–107)
CO2 SERPL-SCNC: 25 MMOL/L (ref 21–32)
CREAT SERPL-MCNC: 1.42 MG/DL (ref 0.6–1)
CROSSMATCH RESULT,%XM: NORMAL
ERYTHROCYTE [DISTWIDTH] IN BLOOD BY AUTOMATED COUNT: 16.1 % (ref 11.9–14.6)
GLUCOSE BLD STRIP.AUTO-MCNC: 119 MG/DL (ref 65–100)
GLUCOSE BLD STRIP.AUTO-MCNC: 183 MG/DL (ref 65–100)
GLUCOSE SERPL-MCNC: 109 MG/DL (ref 65–100)
HCT VFR BLD AUTO: 31 % (ref 35.8–46.3)
HGB BLD-MCNC: 10.2 G/DL (ref 11.7–15.4)
INR PPP: 1.6 (ref 0.9–1.2)
MAGNESIUM SERPL-MCNC: 1.9 MG/DL (ref 1.8–2.4)
MCH RBC QN AUTO: 31.9 PG (ref 26.1–32.9)
MCHC RBC AUTO-ENTMCNC: 32.9 G/DL (ref 31.4–35)
MCV RBC AUTO: 96.9 FL (ref 79.6–97.8)
PLATELET # BLD AUTO: 194 K/UL (ref 150–450)
PMV BLD AUTO: 9.4 FL (ref 10.8–14.1)
POTASSIUM SERPL-SCNC: 4.7 MMOL/L (ref 3.5–5.1)
PROTHROMBIN TIME: 17.7 SEC (ref 9.6–12)
RBC # BLD AUTO: 3.2 M/UL (ref 4.05–5.25)
SODIUM SERPL-SCNC: 141 MMOL/L (ref 136–145)
SPECIMEN EXP DATE BLD: NORMAL
STATUS OF UNIT,%ST: NORMAL
UNIT DIVISION, %UDIV: 0
WBC # BLD AUTO: 6.2 K/UL (ref 4.3–11.1)

## 2017-04-14 PROCEDURE — 94760 N-INVAS EAR/PLS OXIMETRY 1: CPT

## 2017-04-14 PROCEDURE — 82962 GLUCOSE BLOOD TEST: CPT

## 2017-04-14 PROCEDURE — 94640 AIRWAY INHALATION TREATMENT: CPT

## 2017-04-14 PROCEDURE — 74230 X-RAY XM SWLNG FUNCJ C+: CPT

## 2017-04-14 PROCEDURE — 74011250637 HC RX REV CODE- 250/637: Performed by: INTERNAL MEDICINE

## 2017-04-14 PROCEDURE — 92611 MOTION FLUOROSCOPY/SWALLOW: CPT

## 2017-04-14 PROCEDURE — 97110 THERAPEUTIC EXERCISES: CPT

## 2017-04-14 PROCEDURE — 99239 HOSP IP/OBS DSCHRG MGMT >30: CPT | Performed by: INTERNAL MEDICINE

## 2017-04-14 PROCEDURE — 74011636637 HC RX REV CODE- 636/637: Performed by: INTERNAL MEDICINE

## 2017-04-14 PROCEDURE — 74011000255 HC RX REV CODE- 255: Performed by: INTERNAL MEDICINE

## 2017-04-14 PROCEDURE — 74011000250 HC RX REV CODE- 250: Performed by: INTERNAL MEDICINE

## 2017-04-14 PROCEDURE — 85027 COMPLETE CBC AUTOMATED: CPT | Performed by: PHYSICIAN ASSISTANT

## 2017-04-14 PROCEDURE — 80048 BASIC METABOLIC PNL TOTAL CA: CPT | Performed by: PHYSICIAN ASSISTANT

## 2017-04-14 PROCEDURE — 85610 PROTHROMBIN TIME: CPT | Performed by: PHYSICIAN ASSISTANT

## 2017-04-14 PROCEDURE — 83735 ASSAY OF MAGNESIUM: CPT | Performed by: PHYSICIAN ASSISTANT

## 2017-04-14 RX ORDER — LEVALBUTEROL INHALATION SOLUTION 0.63 MG/3ML
0.63 SOLUTION RESPIRATORY (INHALATION)
Qty: 540 PACKAGE | Refills: 11 | Status: SHIPPED | OUTPATIENT
Start: 2017-04-14 | End: 2017-06-01

## 2017-04-14 RX ORDER — METOPROLOL TARTRATE 25 MG/1
25 TABLET, FILM COATED ORAL EVERY 6 HOURS
Qty: 120 TAB | Refills: 11 | Status: ON HOLD | OUTPATIENT
Start: 2017-04-14 | End: 2017-05-04

## 2017-04-14 RX ORDER — DOCUSATE SODIUM 100 MG/1
100 CAPSULE, LIQUID FILLED ORAL 2 TIMES DAILY
Qty: 60 CAP | Refills: 11 | Status: SHIPPED | OUTPATIENT
Start: 2017-04-14

## 2017-04-14 RX ORDER — AMIODARONE HYDROCHLORIDE 200 MG/1
200 TABLET ORAL EVERY 12 HOURS
Qty: 60 TAB | Refills: 11 | Status: SHIPPED | OUTPATIENT
Start: 2017-04-14 | End: 2021-10-28

## 2017-04-14 RX ADMIN — DOCUSATE SODIUM 100 MG: 100 CAPSULE, LIQUID FILLED ORAL at 09:29

## 2017-04-14 RX ADMIN — LORATADINE 10 MG: 10 TABLET ORAL at 09:29

## 2017-04-14 RX ADMIN — AMIODARONE HYDROCHLORIDE 200 MG: 200 TABLET ORAL at 09:29

## 2017-04-14 RX ADMIN — METOPROLOL TARTRATE 25 MG: 25 TABLET, FILM COATED ORAL at 12:33

## 2017-04-14 RX ADMIN — PANTOPRAZOLE SODIUM 40 MG: 40 TABLET, DELAYED RELEASE ORAL at 07:25

## 2017-04-14 RX ADMIN — BARIUM SULFATE 60 ML: 980 POWDER, FOR SUSPENSION ORAL at 12:07

## 2017-04-14 RX ADMIN — LEVALBUTEROL HYDROCHLORIDE 0.63 MG: 0.63 SOLUTION RESPIRATORY (INHALATION) at 07:12

## 2017-04-14 RX ADMIN — APIXABAN 5 MG: 5 TABLET, FILM COATED ORAL at 09:29

## 2017-04-14 RX ADMIN — BARIUM SULFATE 15 ML: 400 PASTE ORAL at 12:08

## 2017-04-14 RX ADMIN — LEVALBUTEROL HYDROCHLORIDE 0.63 MG: 0.63 SOLUTION RESPIRATORY (INHALATION) at 01:30

## 2017-04-14 RX ADMIN — Medication 10 ML: at 05:35

## 2017-04-14 RX ADMIN — METOPROLOL TARTRATE 25 MG: 25 TABLET, FILM COATED ORAL at 02:02

## 2017-04-14 RX ADMIN — HUMAN INSULIN 2 UNITS: 100 INJECTION, SOLUTION SUBCUTANEOUS at 12:32

## 2017-04-14 RX ADMIN — METOPROLOL TARTRATE 25 MG: 25 TABLET, FILM COATED ORAL at 07:25

## 2017-04-14 NOTE — PROGRESS NOTES
TRANSFER - OUT REPORT:    Verbal report given to Fatmata Russo (name) on [facility]  being transferred to [facility] (unit) for routine progression of care       Report consisted of patients Situation, Background, Assessment and   Recommendations(SBAR). Information from the following report(s) SBAR, Kardex, [facility] and Recent Results was reviewed with the receiving nurse. Lines:       Opportunity for questions and clarification was provided.       Patient transported with:  Syd Morales

## 2017-04-14 NOTE — PROGRESS NOTES
Bedside and Verbal shift change report given to self (oncoming nurse) by Florecita Tripathi RN (offgoing nurse). Report included the following information SBAR, Kardex, MAR and Recent Results.

## 2017-04-14 NOTE — PROGRESS NOTES
Speech language pathology: modified barium swallow study: Initial Assessment    NAME/AGE/GENDER: Sahara Armando is a 80 y.o. female  DATE: 4/14/2017  PRIMARY DIAGNOSIS: [facility]       ICD-10: Treatment Diagnosis: dysphagia; oropharyngeal R13.12  INTERDISCIPLINARY COLLABORATION: radiologist  PRECAUTIONS/ALLERGIES: Review of patient's allergies indicates no known allergies. ASSESSMENT/PLAN OF CARE:Based on the objective data described below, Ms. Ryan Weinstein presents with mild dysphagia. Trace, shallow penetration with initial cup sip of thin liquids. Patient was unable to hold the cup well herself with assistance required. Transient penetration only with thin liquids via the straw including when taking large, serial swallows. Cough x2 during the study but it was unrelated to swallowing function or any aspiration event. No penetration or aspiration and good pharyngeal clearance with the pudding bolus. She was unable to masticate the mixed trial and it was expelled. Recommend pureed/thin liquids. Meds in puree. Patient will benefit from skilled intervention to address the below impairments. ?????? ? ? This section established at most recent assessment??????????  RECOMMENDATIONS AND PLANNED INTERVENTIONS (Benefits and precautions of therapy have been discussed with the patient.):  · PO:  Pureed  · Liquids:  regular thin  MEDICATIONS:  · Crushed in puree  COMPENSATORY STRATEGIES/MODIFICATIONS INCLUDING:  · Small sips and bites  OTHER RECOMMENDATIONS (including follow up treatment recommendations): · Family training/education  · Laryngeal exercises  · Patient education  FREQUENCY/DURATION: Continue to follow patient 2x a week or until short term goals are met to address above goals. RECOMMENDED REHABILITATION/EQUIPMENT: (at time of discharge pending progress):   Rehab. SUBJECTIVE:   Cooperative. Hard of hearing.   History of Present Injury/Illness: Ms. Ryan Weinstein  has a past medical history of Asthma (7/1/2015); Heartburn (7/1/2015); Hyperlipidemia (7/1/2015); Hypertension (7/1/2015); Type 2 diabetes mellitus ([facility] [address] and Urge incontinence (7/1/2015). She also  has a past surgical history that includes cataract removal (2011). Present Symptoms: cough  Pain Intensity 1: 0  Pain Location 1: Abdomen  Pain Orientation 1: Right, Mid  Pain Intervention(s) 1: Repositioned    Current Dietary Status:  Pureed/honey thick liquids   Radiologist: Dr. German Saul Situation: home with family going to rehab  Home Environment: Private residence  # Steps to Enter: 3  One/Two Story Residence: One story  Living Alone:  (moving in with daughter)  Support Systems: Family member(s)  Patient Expects to be Discharged to[de-identified] Private residence  Current DME Used/Available at Home: Cane, quad, Grab bars  Tub or Shower Type: Tub/Shower combination  OBJECTIVE:     Cognitive/Communication Status:  Mental Status  Neurologic State: Alert  Orientation Level: Oriented to person, Oriented to place  Cognition: Follows commands  Perception: Appears intact  Perseveration: No perseveration noted  Safety/Judgement: Fall prevention    Oral Assessment:  Oral Assessment  Labial: No impairment  Dentition: Edentulous  Oral Hygiene: fair  Lingual: Decreased rate    Vocal Quality: low    Patient Viewed: Patient Position: upright in chair  Film Views: Lateral, Fluoro    Oral Prepatory:  The patient was given the following: Consistency Presented:  Thin liquid, Mixed consistency, Pudding  How Presented: SLP-fed/presented, Cup/sip, Spoon, Straw    Oral Phase:  Bolus Acceptance: Impaired  Bolus Formation/Control: Impaired  Propulsion: No impairment  Type of Impairment: Delayed, Mastication, Incomplete  Oral Residue: None  Initiation of Swallow: Triggered at vallecula  Oral Phase Severity: Moderate    Pharyngeal Phase:  Timing: Pooling 1-5 sec  Decreased Tongue Base Retraction?: No  Laryngeal Elevation: WFL (within functional limits)  Penetration: Flash/transient, Trace, During swallow  Aspiration/Timing: No evidence of aspiration  Aspiration/Penetration Score: 2 (Penetration/No residue-Contrast enters the airway penetrates, remains above the folds/cords, and is cleared)  Pharyngeal Symmetry: Not assessed  Pharyngeal Dysfunction: Decreased elevation/closure  Pharyngeal Phase Severity: Mild  Pharyngeal-Esophageal Segment: No impairment    Assessment/Reassessment only, no treatment provided today    Tool Used: Dysphagia Outcome and Severity Scale (CHELA)    Score Comments   Normal Diet  [] 7 With no strategies or extra time needed       Functional Swallow  [] 6 May have mild oral or pharyngeal delay       Mild Dysphagia    [] 5 Which may require one diet consistency restricted (those who demonstrate penetration which is entirely cleared on MBS would be included)   Mild-Moderate Dysphagia  [x] 4 With 1-2 diet consistencies restricted       Moderate Dysphagia  [] 3 With 2 or more diet consistencies restricted       Moderately Severe Dysphagia  [] 2 With partial PO strategies (trials with ST only)       Severe Dysphagia  [] 1 With inability to tolerate any PO safely          Score:  Initial: 4 Most Recent: X (Date: -- )   Interpretation of Tool: The Dysphagia Outcome and Severity Scale (CHELA) is a simple, easy-to-use, 7-point scale developed to systematically rate the functional severity of dysphagia based on objective assessment and make recommendations for diet level, independence level, and type of nutrition. Score 7 6 5 4 3 2 1   Modifier CH CI CJ CK CL CM CN   ?  Swallowing:     - CURRENT STATUS: CK - 40%-59% impaired, limited or restricted    - GOAL STATUS:  CJ - 20%-39% impaired, limited or restricted    - D/C STATUS:  ---------------To be determined---------------  Payor: [employer] VIP CARE PLUS / [place] [employer] VIP CARE PLUS / Product Type: Managed Care Medicare / __________________________________________________________________________________________________  Safety:   After treatment position/precautions:  · Upright in Bed  Recommendations for treatment: laryngeal exercises  Total Treatment Duration:  Time In: 1135   Time Out: [address] [facility] MS, SLP

## 2017-04-14 NOTE — PROGRESS NOTES
SPEECH PATHOLOGY NOTE:    Modified barium swallow study completed. Trace,shallow laryngeal penetration with thin liquids from the cup and transient penetration only from the straw. Patient with cough x2 during the study but was unrelated to swallowing function. Remains unable to masticate soft solids with pureed/thin liquid diet recommended. Full report to follow.     Annia Strange MS, SLP

## 2017-04-14 NOTE — PROGRESS NOTES
Problem: Mobility Impaired (Adult and Pediatric)  Goal: *Acute Goals and Plan of Care (Insert Text)  LTG:  (1.)Ms. Kimberlee Jimenez will move from supine to sit and sit to supine , scoot up and down and roll side to side in bed with STAND BY ASSIST within 7 day(s). (2.)Ms. Kimberlee Jimenez will transfer from bed to chair and chair to bed with STAND BY ASSIST using the least restrictive device within 7 day(s). (3.)Ms. Kimberlee Jimenez will ambulate with CONTACT GUARD ASSIST for 250 feet with the least restrictive device within 7 day(s). (4.)Ms. Kimberlee Jimenez will negotiate up/down 2 steps with with CONTACT GUARD ASSIST within 7 days. ________________________________________________________________________________________________      PHYSICAL THERAPY: Daily Note, Treatment Day: 5th and AM 4/14/2017  INPATIENT: Hospital Day: 11  Payor: [employer] C[employer] PLUS / [place] [employer] C[employer] [place]US / Product Type: [employer] Care Medicare /      NAME/AGE/GENDER: Demetria Thompson is a 80 y.o. female  PRIMARY DIAGNOSIS: PE (pulmonary thromboembolism) ([address] PE (pulmonary thromboembolism) ([facility] [address] PE (pulmonary thromboembolism) ([facility] [address]        ICD-10: Treatment Diagnosis:       · Generalized Muscle Weakness (M62.81)  · Other lack of cordination (R27.8)  · Difficulty in walking, Not elsewhere classified (R26.2)   Precaution/Allergies:  Review of patient's allergies indicates no known allergies. ASSESSMENT:      Ms. Kimberlee Jimenez presents sitting edge of bed  with daughter present. Agreeable to PT treatment, but stating that she is worn out. Performed exercises in sitting. Then stood and took few small steps toward head of bed with RW and CGA. To supine with supervision then performed exercises in supine with max cueing. Possible discharge to rehab today per daughter. . Needs in reach. No significant progress this session. Will continue POC and PT efforts.         This section established at most recent assessment   PROBLEM LIST (Impairments causing functional limitations):  1. Decreased Strength  2. Decreased ADL/Functional Activities  3. Decreased Transfer Abilities  4. Decreased Ambulation Ability/Technique  5. Decreased Balance  6. Decreased Activity Tolerance  7. Increased Fatigue  8. Increased Shortness of Breath    INTERVENTIONS PLANNED: (Benefits and precautions of physical therapy have been discussed with the patient.)  1. Balance Exercise  2. Bed Mobility  3. Family Education  4. Gait Training  5. Therapeutic Activites  6. Therapeutic Exercise/Strengthening  7. Transfer Training  8. Group Therapy      TREATMENT PLAN: Frequency/Duration: 3 times a week for duration of hospital stay  Rehabilitation Potential For Stated Goals: FAIR      RECOMMENDED REHABILITATION/EQUIPMENT: (at time of discharge pending progress): Continue Skilled Therapy and Rehab. HISTORY:   History of Present Injury/Illness (Reason for Referral):  Per H&P, \"Patient is a 80 y.o.  female presents with SOB and R shoulder pain. She went to see her PCP yesterday and she was told she had something wrong with her heart and she was place don [facility]. She has LE edema. She denies chest pain. She presented with A fib qith RVR and also PE. I was asked to admit her. \"  Past Medical History/Comorbidities:   Ms. Curly Thompson  has a past medical history of Asthma (7/1/2015); Heartburn (7/1/2015); Hyperlipidemia (7/1/2015); Hypertension (7/1/2015); Type 2 diabetes mellitus ([facility] [address] and Urge incontinence (7/1/2015). Ms. Curly Thompson  has a past surgical history that includes cataract removal (2011).   Social History/Living Environment:   Home Environment: Private residence  # Steps to Enter: 3  One/Two Story Residence: One story  Living Alone:  (moving in with daughter)  Support Systems: Family member(s)  Patient Expects to be Discharged to[de-identified] Private residence  Current DME Used/Available at Home: Cane, quad, Grab bars  Tub or Shower Type: Tub/Shower combination  Prior Level of Function/Work/Activity:  Independent. Some assistance with mobility. Uses cane. Number of Personal Factors/Comorbidities that affect the Plan of Care:  Age  Decreased insight into deficits  Unsteady on feet  SOB/hx of PE 3+: HIGH COMPLEXITY   EXAMINATION:   Most Recent Physical Functioning:   Gross Assessment:                  Posture:     Balance:  Sitting: Intact  Standing: Impaired  Standing - Static: Fair  Standing - Dynamic : Poor Bed Mobility:  Sit to Supine: Supervision  Wheelchair Mobility:     Transfers:  Sit to Stand: Contact guard assistance  Stand to Sit: Contact guard assistance  Gait:     Base of Support: Center of gravity altered  Step Length: Right shortened;Left shortened  Gait Abnormalities: Decreased step clearance; Other (flexed trunk)  Distance (ft): 3 Feet (ft) (sidestepping to head of bed)  Assistive Device: Walker, rolling  Ambulation - Level of Assistance: Contact guard assistance       Body Structures Involved:  1. Lungs Body Functions Affected:  1. Respiratory  2. Neuromusculoskeletal  3. Movement Related Activities and Participation Affected:  1. General Tasks and Demands  2. Mobility  3. Self Care  4. Domestic Life  5. Community, Social and [place]   Number of elements that affect the Plan of Care: 4+: HIGH COMPLEXITY   CLINICAL PRESENTATION:   Presentation: Stable and uncomplicated: LOW COMPLEXITY   CLINICAL DECISION MAKIN [facility] Inpatient Short Form  How much difficulty does the patient currently have. .. Unable A Lot A Little None   1. Turning over in bed (including adjusting bedclothes, sheets and blankets)? [ ] 1   [ ] 2   [ ] 3   [X] 4   2. Sitting down on and standing up from a chair with arms ( e.g., wheelchair, bedside commode, etc.)   [ ] 1   [ ] 2   [ ] 3   [X] 4   3. Moving from lying on back to sitting on the side of the bed?    [ ] 1   [ ] 2   [ ] 3   [X] 4   How much help from another person does the patient currently need. .. Total A Lot A Little None   4. Moving to and from a bed to a chair (including a wheelchair)? [ ] 1   [X] 2   [ ] 3   [ ] 4   5. Need to walk in hospital room? [ ] 1   [X] 2   [ ] 3   [ ] 4   6. Climbing 3-5 steps with a railing? [X] 1   [ ] 2   [ ] 3   [ ] 4   © 2007, Trustees of [address] [place] [address] [address] [id number], under license to [employer]. All rights reserved    Score:  Initial: 17 Most Recent: X (Date: 4/5/17 )     Interpretation of Tool:  Represents activities that are increasingly more difficult (i.e. Bed mobility, Transfers, Gait). Score 24 23 22-20 19-15 14-10 9-7 6       Modifier CH CI CJ CK CL CM CN         · Mobility - Walking and Moving Around:               - CURRENT STATUS:    CK - 40%-59% impaired, limited or restricted               - GOAL STATUS:           CJ - 20%-39% impaired, limited or restricted               - D/C STATUS:                       ---------------To be determined---------------  Payor: [employer] VIP CARE PLUS / [place] [employer] VIP CARE PLUS / Product Type: Managed Care Medicare /       Medical Necessity:     · Patient is expected to demonstrate progress in strength, balance, coordination and functional technique to improve safety during bed mobility, transfers, and ambulation. Reason for Services/Other Comments:  · Patient continues to require present interventions due to patient's inability to safely transfer and ambulate with assistive device. Use of outcome tool(s) and clinical judgement create a POC that gives a: Questionable prediction of patient's progress: MODERATE COMPLEXITY                 TREATMENT:      Pre-treatment Symptoms/Complaints:  \"I need to lie down. \"  Pain: Initial:     Pain Intensity 1: 0  Post Session:  Voices no c/o      Therapeutic Exercise: (  15 minutes):  Exercises per grid below to improve mobility, strength and coordination.   Required maximal visual and verbal cues to promote proper body alignment and promote correct timing and execution of exercises. .  Progressed complexity of movement as indicated. DATE: 4/14/17        Straight leg raise         Hip abduct/ adduct x10 AB        Heel slides  x10 AB        Hip external/ internal rotation x10 AB        Ankle dorsiflexion/ plantarflexion x20 AB        Long arc quads x10 AB                            Key:  A=active, AA=active assisted, P=passive, B=bilaterally, R=right, L=left            Braces/Orthotics/Lines/Etc:   · none  Treatment/Session Assessment:    · Response to Treatment: fatigued. · Interdisciplinary Collaboration:  · Physical Therapist  · After treatment position/precautions:  · Supine in bed, Bed/Chair-wheels locked, Bed in low position, Call light within reach and Family at bedside  · Compliance with Program/Exercises: Will assess as treatment progresses. · Recommendations/Intent for next treatment session: \"Next visit will focus on advancements to more challenging activities and reduction in assistance provided\".   Total Treatment Duration:  PT Patient Time In/Time Out  Time In: 0955  Time Out: [address], PT

## 2017-04-14 NOTE — PROGRESS NOTES
Problem: Falls - Risk of  Goal: *Absence of falls  Outcome: Progressing Towards Goal  Gripper socks on, bed alarm set, bed low and locked, call light within reach    Problem: Patient Education: Go to Patient Education Activity  Goal: Patient/Family Education  Outcome: Progressing Towards Goal  Pt encouraged to call for assistance    Problem: Patient Education: Go to Patient Education Activity  Goal: Patient/Family Education  Outcome: Progressing Towards Goal  meds in applesauce

## 2017-04-14 NOTE — DISCHARGE SUMMARY
DISCHARGE NOTE    [address] [facility]  Admission date:  4/4/2017  Discharge date:  4/14/2017    Admitting Diagnosis:  PE (pulmonary thromboembolism) [facility])    Discharge Diagnoses:    Hospital Problems  Date Reviewed: 4/10/2017          Codes Class Noted POA    Abdominal pain ICD-10-CM: R10.9  ICD-9-CM: 789.00  4/9/2017 No        Acute renal failure (ARF) ([facility] ICD-10-CM: N17.9  ICD-9-CM: 584.9  4/7/2017 No        Do not resuscitate (Chronic) ICD-10-CM: Z66  ICD-9-CM: V49.86 Chronic 4/7/2017 Yes        Asthma (Chronic) ICD-10-CM: J45.909  ICD-9-CM: 493.90  4/4/2017 Yes        * (Principal)PE (pulmonary thromboembolism) ([facility] ICD-10-CM: I26.99  ICD-9-CM: 415.19  4/4/2017 Yes        Atrial fibrillation ([facility] ICD-10-CM: I48.91  ICD-9-CM: 427.31  4/4/2017 Yes        Pleural effusion ICD-10-CM: J90  ICD-9-CM: 511.9  4/4/2017 Yes        Debility ICD-10-CM: R53.81  ICD-9-CM: 799.3  4/4/2017 Yes        Hypoxemia ICD-10-CM: R09.02  ICD-9-CM: 799.02  4/4/2017 Yes              Consultants:cardiology, palliative care    Studies/Procedures:  CT chest: 4/4/17:  1. POSITIVE BIBASILAR PULMONARY EMBOLI.     2. HEART FAILURE WITH SMALL BILATERAL PLEURAL EFFUSIONS.     3. LARGE HIATAL HERNIA. Condition on Discharge:  stable    Disposition:  Discharge to [facility] course:  81 yo  female with a history of HTN, HLD, asthma, and DM2. Pt presented to the ER with afib with RVR. She was also found to have elevated ddimer and CTA chest revealed bibasilar pulmonary emboli, small bilateral pleural effusions, pulmonary edema, and large hiatal hernia. Was initially placed on heparin drip and cardiology consulted--placed on amio drip for uncontrolled afib. Palliative Care consulted and DNR status was confirmed. ECHO with reduced EF 30-35%. Xarelto was added but changed to Coumadin due to acute renal failure. Her renal failure improved and she was switched to Eliquis. She was anemic and was transfused 1 unit PRBC. Her hemoglobin improved and she is on RA. Pt is stable to discharge to STR for continued PT. Physical Exam:   Constitution:  the patient is well developed and in no acute distress  EENMT:  Sclera clear, pupils equal, oral mucosa moist  Respiratory: fairly clear  Cardiovascular:  RRR without M,G,R  Gastrointestinal: soft and non-tender; with positive bowel sounds. Musculoskeletal: warm without cyanosis. There is no lower leg edema. Skin:  no jaundice or rashes  Neurologic: no gross neuro deficits     Psychiatric:  alert and oriented x 3      LAB  Recent Labs      04/14/17   0625  04/13/17   1530  04/13/17   0155   04/12/17   0828   WBC  6.2   --   7.3   --   7.3   HGB  10.2*  9.8*  7.7*   < >  8.0*   HCT  31.0*  29.9*  23.7*   < >  25.2*   PLT  194   --   182   --   187   INR  1.6*   --   2.2*   --   6.0*    < > = values in this interval not displayed. Recent Labs      04/14/17   0625  04/13/17   0155  04/12/17   0828   NA  141  141  140   K  4.7  4.6  4.4   CL  106  107  106   CO2  25  31  25   BUN  33*  35*  34*   CREA  1.42*  1.46*  1.56*   MG  1.9   --    --    CA  8.5  8.5  8.8     No results for input(s): PH, PCO2, PO2, HCO3 in the last 72 hours. Discharge Medications:   Current Discharge Medication List      START taking these medications    Details   amiodarone (CORDARONE) 200 mg tablet Take 1 Tab by mouth every twelve (12) hours. Qty: 60 Tab, Refills: 11      apixaban (ELIQUIS) 5 mg tablet Take 1 Tab by mouth every twelve (12) hours. Qty: 60 Tab, Refills: 11      docusate sodium (COLACE) 100 mg capsule Take 1 Cap by mouth two (2) times a day. Qty: 60 Cap, Refills: 11      sodium chloride (OCEAN) 0.65 % nasal spray 1 [place] by Both Nostrils route as needed for Congestion (Keep at bedside for PRN use.). Qty: 15 mL, Refills: 11      levalbuterol (XOPENEX) 0.63 mg/3 mL nebu 3 mL by Nebulization route every six (6) hours as needed.   Qty: 540 Package, Refills: 11      metoprolol tartrate (LOPRESSOR) 25 mg tablet Take 1 Tab by mouth every six (6) hours. Qty: 120 Tab, Refills: 11         CONTINUE these medications which have NOT CHANGED    Details   !! multivit-min-FA-lycopen-lutein (CERTAVITE SENIOR-ANTIOXIDANT) 0.4-300-250 mg-mcg-mcg tab Take  by mouth daily. !! CERTAVITE SENIOR-ANTIOXIDANT 0.4-300-250 mg-mcg-mcg tab Take 1 Tab by mouth daily. Qty: 90 Tab, Refills: 3      atorvastatin (LIPITOR) 40 mg tablet Take 1 Tab by mouth nightly. Qty: 90 Tab, Refills: 3      niacin ER (NIASPAN) 500 mg tablet Take 1 Tab by mouth nightly. Qty: 90 Tab, Refills: 3      pantoprazole (PROTONIX) 20 mg tablet Take 1 Tab by mouth daily. Indications: HEARTBURN  Qty: 90 Tab, Refills: 2      loratadine (CLARITIN) 10 mg tablet Take 1 Tab by mouth daily. Qty: 90 Tab, Refills: 3      fluticasone-salmeterol (ADVAIR DISKUS) 250-50 mcg/dose diskus inhaler Take 1 Puff by inhalation two (2) times a day. Qty: 1 Inhaler, Refills: 11      acetaminophen (TYLENOL) 650 mg CR tablet Take 1 Tab by mouth every eight (8) hours. 2 TABS  Qty: 90 Tab, Refills: 11      ENTERIC COATED ASPIRIN PO Take 81 mg by mouth daily. CALCIUM PHOSPHATE/VITAMIN D2 (CALCIUM-VITAMIN D2 PO) Take  by mouth three (3) times daily. !! - Potential duplicate medications found. Please discuss with provider. STOP taking these medications       lisinopril (PRINIVIL, ZESTRIL) 20 mg tablet Comments:   Reason for Stopping:         hydrochlorothiazide (HYDRODIURIL) 25 mg tablet Comments:   Reason for Stopping:         amLODIPine (NORVASC) 2.5 mg tablet Comments:   Reason for Stopping:         metFORMIN (GLUCOPHAGE) 500 mg tablet Comments:   Reason for Stopping:                 Condition on Discharge:  stable      Followup/Outpt Studies:  --Follow up appointment with [facility] in 6 weeks. --pt to follow up with [facility] cardiology in 1 week    --Total discharge greater than 30 minutes in duration.     More than 50% of the time documented was spent in face-to-face contact with the patient and in the care of the patient on the floor/unit where the patient is located. JESUS Maharaj    Lungs:  clear  Heart:  RRR with no Murmur/Rubs/Gallops    Additional Comments: To rehab topday     I have spoken with and examined the patient. I agree with the above assessment and plan as documented.     Jorge Luis Angel MD

## 2017-04-14 NOTE — PROGRESS NOTES
Verbal bedside report given to Nolvia Parks oncoming RN. Patient's situation, background, assessment and recommendations provided. Opportunity for questions provided. Oncoming RN assumed care of patient.

## 2017-04-14 NOTE — DISCHARGE INSTRUCTIONS
Atrial Fibrillation: Care Instructions  Your Care Instructions    Atrial fibrillation is an irregular and often fast heartbeat. Treating this condition is important for several reasons. It can cause blood clots, which can travel from your heart to your brain and cause a stroke. If you have a fast heartbeat, you may feel lightheaded, dizzy, and weak. An irregular heartbeat can also increase your risk for heart failure. Atrial fibrillation is often the result of another heart condition, such as high blood pressure or coronary artery disease. Making changes to improve your heart condition will help you stay healthy and active. Follow-up care is a key part of your treatment and safety. Be sure to make and go to all appointments, and call your doctor if you are having problems. It's also a good idea to know your test results and keep a list of the medicines you take. How can you care for yourself at home? Medicines  · Take your medicines exactly as prescribed. Call your doctor if you think you are having a problem with your medicine. You will get more details on the specific medicines your doctor prescribes. · If your doctor has given you a blood thinner to prevent a stroke, be sure you get instructions about how to take your medicine safely. Blood thinners can cause serious bleeding problems. · Do not take any vitamins, over-the-counter drugs, or herbal products without talking to your doctor first.  Lifestyle changes  · Do not smoke. Smoking can increase your chance of a stroke and heart attack. If you need help quitting, talk to your doctor about stop-smoking programs and medicines. These can increase your chances of quitting for good. · Eat a heart-healthy diet. · Stay at a healthy weight. Lose weight if you need to. · Limit alcohol to 2 drinks a day for men and 1 drink a day for women. Too much alcohol can cause health problems. · Avoid colds and flu. Get a pneumococcal vaccine shot.  If you have had one before, ask your doctor whether you need another dose. Get a flu shot every year. If you must be around people with colds or flu, wash your hands often. Activity  · If your doctor recommends it, get more exercise. Walking is a good choice. Bit by bit, increase the amount you walk every day. Try for at least 30 minutes on most days of the week. You also may want to swim, bike, or do other activities. Your doctor may suggest that you join a cardiac rehabilitation program so that you can have help increasing your physical activity safely. · Start light exercise if your doctor says it is okay. Even a small amount will help you get stronger, have more energy, and manage stress. Walking is an easy way to get exercise. Start out by walking a little more than you did in the hospital. Gradually increase the amount you walk. · When you exercise, watch for signs that your heart is working too hard. You are pushing too hard if you cannot talk while you are exercising. If you become short of breath or dizzy or have chest pain, sit down and rest immediately. · Check your pulse regularly. Place two fingers on the artery at the palm side of your wrist, in line with your thumb. If your heartbeat seems uneven or fast, talk to your doctor. When should you call for help? Call 911 anytime you think you may need emergency care. For example, call if:  · You have symptoms of a heart attack. These may include:  ¨ Chest pain or pressure, or a strange feeling in the chest.  ¨ Sweating. ¨ Shortness of breath. ¨ Nausea or vomiting. ¨ Pain, pressure, or a strange feeling in the back, neck, jaw, or upper belly or in one or both shoulders or arms. ¨ Lightheadedness or sudden weakness. ¨ A fast or irregular heartbeat. After you call 911, the  may tell you to chew 1 adult-strength or 2 to 4 low-dose aspirin. Wait for an ambulance. Do not try to drive yourself. · You have symptoms of a stroke.  These may include:  ¨ Sudden numbness, tingling, weakness, or loss of movement in your face, arm, or leg, especially on only one side of your body. ¨ Sudden vision changes. ¨ Sudden trouble speaking. ¨ Sudden confusion or trouble understanding simple statements. ¨ Sudden problems with walking or balance. ¨ A sudden, severe headache that is different from past headaches. · You passed out (lost consciousness). Call your doctor now or seek immediate medical care if:  · You have new or increased shortness of breath. · You feel dizzy or lightheaded, or you feel like you may faint. · Your heart rate becomes irregular. · You can feel your heart flutter in your chest or skip heartbeats. Tell your doctor if these symptoms are new or worse. Watch closely for changes in your health, and be sure to contact your doctor if you have any problems. Where can you learn more? Go to http://michael-mauro.info/. Enter U020 in the search box to learn more about \"Atrial Fibrillation: Care Instructions. \"  Current as of: January 27, 2016  Content Version: 11.2  © 6187-0661 [employer]. Care instructions adapted under license by [employer] (which disclaims liability or warranty for this information). If you have questions about a medical condition or this instruction, always ask your healthcare professional. [address] any warranty or liability for your use of this information. Pulmonary Embolism: Care Instructions  Your Care Instructions  Pulmonary embolism is the sudden blockage of an artery in the lung. Blood clots in the deep veins of the leg or pelvis (deep vein thrombosis, or DVT) are the most common cause. These blood clots can travel to the lungs. Pulmonary embolism can be very serious. Because you have had one pulmonary embolism, you are at greater risk for having another one.  But you can take steps to prevent another pulmonary embolism by following your doctor's instructions. You will probably take a prescription blood-thinning medicine to prevent blood clots. A blood thinner can stop a blood clot from growing larger and prevent new clots from forming. Follow-up care is a key part of your treatment and safety. Be sure to make and go to all appointments, and call your doctor if you are having problems. It's also a good idea to know your test results and keep a list of the medicines you take. How can you care for yourself at home? · Take your medicines exactly as prescribed. Call your doctor if you think you are having a problem with your medicine. You will get more details on the specific medicines your doctor prescribes. · If you are taking a blood thinner, be sure you get instructions about how to take your medicine safely. Blood thinners can cause serious bleeding problems. Preventing future pulmonary embolisms  · Exercise. Keep blood moving in your legs to keep clots from forming. If you are traveling by car, stop every hour or so. Get out and walk around for a few minutes. If you are traveling by bus, train, or plane, get out of your seat and walk up and down the aisles every hour or so. You also can do leg exercises while you are seated. Pump your feet up and down by pulling your toes up toward your knees then pointing them down. · Get up out of bed as soon as possible after an illness or surgery. · Do not smoke. If you need help quitting, talk to your doctor about stop-smoking programs and medicines. These can increase your chances of quitting for good. · Check with your doctor before taking hormone or birth control pills. These may increase your risk of blot clots. · Ask your doctor about wearing compression stockings to help prevent blood clots in your legs. You can buy these with a prescription at medical supply stores and some drugstores. When should you call for help? Call 911 anytime you think you may need emergency care.  For example, call if:  · You have shortness of breath. · You have chest pain. · You passed out (lost consciousness). · You cough up blood. Call your doctor now or seek immediate medical care if:  · You have new or worsening pain or swelling in your leg. Watch closely for changes in your health, and be sure to contact your doctor if:  · You do not get better as expected. Where can you learn more? Go to http://michael-mauro.info/. Enter U252 in the search box to learn more about \"Pulmonary Embolism: Care Instructions. \"  Current as of: October 13, 2016  Content Version: 11.2  © 4713-8831 [employer]. Care instructions adapted under license by [employer] (which disclaims liability or warranty for this information). If you have questions about a medical condition or this instruction, always ask your healthcare professional. [address] any warranty or liability for your use of this information.

## 2017-04-14 NOTE — PROGRESS NOTES
[facility] CARDIOLOGY PROGRESS NOTE           4/14/2017 7:41 AM    Admit Date: 4/4/2017      Subjective:   Patient denies palpitations or tachycardia. REmains in atypical flutter with 2:1 conduction with rate 100. BP acceptable. INR 1.6.      ROS:  Cardiovascular:  As noted above    Objective:      Vitals:    04/14/17 0108 04/14/17 0130 04/14/17 0545 04/14/17 0722   BP: 98/63  98/53 113/68   Pulse: 98  93 100   Resp: 18  18 16   Temp: 97.6 °F (36.4 °C)  98.1 °F (36.7 °C) 97.6 °F (36.4 °C)   SpO2: 96% 98% 96% 100%   Weight:   71.1 kg (156 lb 12.8 oz)    Height:           Physical Exam:  General-Elderly frail WF in NAD  Neck- supple, no JVD  CV- regular rhythm with mildly tachycardic rate. Lung- clear bilaterally  Abd- soft, nontender, nondistended  Ext- no edema bilaterally. Skin- warm and dry      Data Review:   Recent Labs      04/14/17   0625  04/13/17   1530  04/13/17   0155   04/12/17   0828   NA   --    --   141   --   140   K   --    --   4.6   --   4.4   BUN   --    --   35*   --   34*   CREA   --    --   1.46*   --   1.56*   GLU   --    --   100   --   114*   WBC  6.2   --   7.3   --   7.3   HGB  10.2*  9.8*  7.7*   < >  8.0*   HCT  31.0*  29.9*  23.7*   < >  25.2*   PLT  194   --   182   --   187   INR  1.6*   --   2.2*   --   6.0*    < > = values in this interval not displayed. No results found for: WAN Brooke    Assessment/Plan:     Principal Problem:    PE [facility] (4/4/2017)  Erratic INR. Doubt will be able to tolerate coumadin. Renal function improved. Start Eliquis 5 mg BID (has completed 7 days of initial therapy). Active Problems:    Asthma (4/4/2017)  Per primary      Atrial fibrillation ([address] (4/4/2017)  Acceptable rate control on current regimen. Continue amiodarone and lopressor at current dose. Debility (4/4/2017)  Plans for placement in nursing facility.         Acute renal failure (ARF) ([facility]) (4/7/2017)  Improved      Do not resuscitate (4/7/2017)  Noted          Jeanna Pichardo MD  4/14/2017 7:41 AM

## 2017-04-15 ENCOUNTER — PATIENT OUTREACH (OUTPATIENT)
Dept: CASE MANAGEMENT | Age: 82
End: 2017-04-15

## 2017-04-15 NOTE — PROGRESS NOTES
KENNEDY outreach postponed for 21 days due to discharge to non-preferred network SNF. This note will not be viewable in [address]

## 2017-04-28 ENCOUNTER — HOSPITAL ENCOUNTER (INPATIENT)
Age: 82
LOS: 4 days | Discharge: NURSING, REHAB OR LONG-TERM CARE | DRG: 291 | End: 2017-05-04
Attending: INTERNAL MEDICINE | Admitting: INTERNAL MEDICINE
Payer: COMMERCIAL

## 2017-04-28 PROBLEM — E87.70 VOLUME OVERLOAD: Status: ACTIVE | Noted: 2017-04-28

## 2017-04-28 LAB
ANION GAP BLD CALC-SCNC: 4 MMOL/L (ref 7–16)
BASOPHILS # BLD AUTO: 0 K/UL (ref 0–0.2)
BASOPHILS # BLD: 0 % (ref 0–2)
BUN SERPL-MCNC: 27 MG/DL (ref 8–23)
CALCIUM SERPL-MCNC: 8.5 MG/DL (ref 8.3–10.4)
CHLORIDE SERPL-SCNC: 105 MMOL/L (ref 98–107)
CO2 SERPL-SCNC: 34 MMOL/L (ref 21–32)
CREAT SERPL-MCNC: 1.16 MG/DL (ref 0.6–1)
DIFFERENTIAL METHOD BLD: ABNORMAL
EOSINOPHIL # BLD: 0 K/UL (ref 0–0.8)
EOSINOPHIL NFR BLD: 0 % (ref 0.5–7.8)
ERYTHROCYTE [DISTWIDTH] IN BLOOD BY AUTOMATED COUNT: 18.5 % (ref 11.9–14.6)
GLUCOSE SERPL-MCNC: 159 MG/DL (ref 65–100)
HCT VFR BLD AUTO: 34.7 % (ref 35.8–46.3)
HGB BLD-MCNC: 10.9 G/DL (ref 11.7–15.4)
IMM GRANULOCYTES # BLD: 0 K/UL (ref 0–0.5)
IMM GRANULOCYTES NFR BLD AUTO: 0.4 % (ref 0–5)
LYMPHOCYTES # BLD AUTO: 5 % (ref 13–44)
LYMPHOCYTES # BLD: 0.4 K/UL (ref 0.5–4.6)
MCH RBC QN AUTO: 32.8 PG (ref 26.1–32.9)
MCHC RBC AUTO-ENTMCNC: 31.4 G/DL (ref 31.4–35)
MCV RBC AUTO: 104.5 FL (ref 79.6–97.8)
MONOCYTES # BLD: 0.4 K/UL (ref 0.1–1.3)
MONOCYTES NFR BLD AUTO: 5 % (ref 4–12)
NEUTS SEG # BLD: 7.4 K/UL (ref 1.7–8.2)
NEUTS SEG NFR BLD AUTO: 90 % (ref 43–78)
PLATELET # BLD AUTO: 219 K/UL (ref 150–450)
PMV BLD AUTO: 9.8 FL (ref 10.8–14.1)
POTASSIUM SERPL-SCNC: 5.3 MMOL/L (ref 3.5–5.1)
RBC # BLD AUTO: 3.32 M/UL (ref 4.05–5.25)
SODIUM SERPL-SCNC: 143 MMOL/L (ref 136–145)
TROPONIN I SERPL-MCNC: 0.05 NG/ML (ref 0.02–0.05)
WBC # BLD AUTO: 8.3 K/UL (ref 4.3–11.1)

## 2017-04-28 PROCEDURE — 94640 AIRWAY INHALATION TREATMENT: CPT

## 2017-04-28 PROCEDURE — 74011250637 HC RX REV CODE- 250/637: Performed by: NURSE PRACTITIONER

## 2017-04-28 PROCEDURE — 94760 N-INVAS EAR/PLS OXIMETRY 1: CPT

## 2017-04-28 PROCEDURE — 77010033678 HC OXYGEN DAILY

## 2017-04-28 PROCEDURE — 74011250636 HC RX REV CODE- 250/636: Performed by: NURSE PRACTITIONER

## 2017-04-28 PROCEDURE — 99218 HC RM OBSERVATION: CPT

## 2017-04-28 PROCEDURE — 36415 COLL VENOUS BLD VENIPUNCTURE: CPT | Performed by: NURSE PRACTITIONER

## 2017-04-28 PROCEDURE — 84484 ASSAY OF TROPONIN QUANT: CPT | Performed by: NURSE PRACTITIONER

## 2017-04-28 PROCEDURE — 80048 BASIC METABOLIC PNL TOTAL CA: CPT | Performed by: NURSE PRACTITIONER

## 2017-04-28 PROCEDURE — 74011000250 HC RX REV CODE- 250: Performed by: INTERNAL MEDICINE

## 2017-04-28 PROCEDURE — 85025 COMPLETE CBC W/AUTO DIFF WBC: CPT | Performed by: NURSE PRACTITIONER

## 2017-04-28 RX ORDER — SODIUM CHLORIDE 0.9 % (FLUSH) 0.9 %
5-10 SYRINGE (ML) INJECTION EVERY 8 HOURS
Status: DISCONTINUED | OUTPATIENT
Start: 2017-04-28 | End: 2017-05-04 | Stop reason: HOSPADM

## 2017-04-28 RX ORDER — MORPHINE SULFATE 2 MG/ML
2 INJECTION, SOLUTION INTRAMUSCULAR; INTRAVENOUS
Status: DISCONTINUED | OUTPATIENT
Start: 2017-04-28 | End: 2017-05-04 | Stop reason: HOSPADM

## 2017-04-28 RX ORDER — BUDESONIDE 0.5 MG/2ML
500 INHALANT ORAL
Status: DISCONTINUED | OUTPATIENT
Start: 2017-04-28 | End: 2017-05-04 | Stop reason: HOSPADM

## 2017-04-28 RX ORDER — LEVALBUTEROL INHALATION SOLUTION 0.63 MG/3ML
0.63 SOLUTION RESPIRATORY (INHALATION)
Status: DISCONTINUED | OUTPATIENT
Start: 2017-04-28 | End: 2017-05-04 | Stop reason: HOSPADM

## 2017-04-28 RX ORDER — ALBUTEROL SULFATE 2.5 MG/.5ML
2.5 SOLUTION RESPIRATORY (INHALATION)
Status: DISCONTINUED | OUTPATIENT
Start: 2017-04-28 | End: 2017-05-04 | Stop reason: HOSPADM

## 2017-04-28 RX ORDER — AMIODARONE HYDROCHLORIDE 200 MG/1
200 TABLET ORAL EVERY 12 HOURS
Status: DISCONTINUED | OUTPATIENT
Start: 2017-04-28 | End: 2017-05-04 | Stop reason: HOSPADM

## 2017-04-28 RX ORDER — SODIUM CHLORIDE 0.9 % (FLUSH) 0.9 %
5-10 SYRINGE (ML) INJECTION AS NEEDED
Status: DISCONTINUED | OUTPATIENT
Start: 2017-04-28 | End: 2017-05-04 | Stop reason: HOSPADM

## 2017-04-28 RX ORDER — PANTOPRAZOLE SODIUM 40 MG/1
40 TABLET, DELAYED RELEASE ORAL DAILY
Status: DISCONTINUED | OUTPATIENT
Start: 2017-04-29 | End: 2017-05-04 | Stop reason: HOSPADM

## 2017-04-28 RX ORDER — METOPROLOL TARTRATE 25 MG/1
25 TABLET, FILM COATED ORAL EVERY 6 HOURS
Status: DISCONTINUED | OUTPATIENT
Start: 2017-04-28 | End: 2017-04-29

## 2017-04-28 RX ORDER — FUROSEMIDE 10 MG/ML
40 INJECTION INTRAMUSCULAR; INTRAVENOUS 2 TIMES DAILY
Status: DISCONTINUED | OUTPATIENT
Start: 2017-04-28 | End: 2017-04-29

## 2017-04-28 RX ORDER — ATORVASTATIN CALCIUM 40 MG/1
40 TABLET, FILM COATED ORAL
Status: DISCONTINUED | OUTPATIENT
Start: 2017-04-28 | End: 2017-05-04 | Stop reason: HOSPADM

## 2017-04-28 RX ORDER — DOCUSATE SODIUM 100 MG/1
100 CAPSULE, LIQUID FILLED ORAL 2 TIMES DAILY
Status: DISCONTINUED | OUTPATIENT
Start: 2017-04-28 | End: 2017-05-04 | Stop reason: HOSPADM

## 2017-04-28 RX ADMIN — APIXABAN 5 MG: 5 TABLET, FILM COATED ORAL at 23:01

## 2017-04-28 RX ADMIN — AMIODARONE HYDROCHLORIDE 200 MG: 200 TABLET ORAL at 23:01

## 2017-04-28 RX ADMIN — Medication 5 ML: at 23:03

## 2017-04-28 RX ADMIN — ALBUTEROL SULFATE 2.5 MG: 2.5 SOLUTION RESPIRATORY (INHALATION) at 19:12

## 2017-04-28 RX ADMIN — ATORVASTATIN CALCIUM 40 MG: 40 TABLET, FILM COATED ORAL at 23:01

## 2017-04-28 RX ADMIN — FUROSEMIDE 40 MG: 10 INJECTION, SOLUTION INTRAMUSCULAR; INTRAVENOUS at 19:01

## 2017-04-28 RX ADMIN — DOCUSATE SODIUM 100 MG: 100 CAPSULE, LIQUID FILLED ORAL at 19:00

## 2017-04-28 RX ADMIN — METOPROLOL TARTRATE 25 MG: 25 TABLET ORAL at 23:18

## 2017-04-28 RX ADMIN — BUDESONIDE 500 MCG: 0.5 SUSPENSION RESPIRATORY (INHALATION) at 19:13

## 2017-04-28 RX ADMIN — METOPROLOL TARTRATE 25 MG: 25 TABLET ORAL at 19:00

## 2017-04-28 NOTE — PROGRESS NOTES
Bedside and Verbal shift change report given to self (oncoming nurse) by Sushila Farah RN (offgoing nurse). Report included the following information SBAR, Kardex, MAR and Recent Results.

## 2017-04-28 NOTE — PROGRESS NOTES
Pt arrived to floor. Denies pain, but has intermittent SOB. Assessment completed. Skin assessment completed. Abnormalities include: bruised BUE, Bruised suprapubic area, sacrum pink and blanchable- allyven applied. Pt alert and oriented X4. Daughter at bedside. Connor Hem, NP notified of arrival. No other needs. Will monitor.

## 2017-04-28 NOTE — PROGRESS NOTES
Bedside and Verbal shift change report given to Paulino Law RN (oncoming nurse) by SHELLIE Tay (offgoing nurse). Report included the following information SBAR, Kardex, MAR and Accordion.

## 2017-04-28 NOTE — IP AVS SNAPSHOT
[address] 
[phone] Patient: Krista Bruno MRN: [id number] You are allergic to the following No active allergies Immunizations Administered for This Admission Name Date  
 TB Skin Test (PPD) Intradermal 2017 Recent Documentation Height Weight BMI OB Status Smoking Status 1.499 m 58.4 kg 26.01 kg/m2 Postmenopausal Never Smoker Emergency Contacts Name Discharge Info Relation Home Work Mobile [facility]  Child [2] [phone] About your hospitalization You were admitted on:  2017 You last received care in the:  [facility] 3 TELEMETRY You were discharged on: May 4, 2017 Unit phone number:  [phone] Why you were hospitalized Your primary diagnosis was:  Acute Systolic (Congestive) Heart Failure (Hcc) Your diagnoses also included:  Pe (Pulmonary Thromboembolism) (Hcc), Hypertension, Hyperlipidemia, Do Not Resuscitate, Atrial Fibrillation (Hcc), Volume Overload Providers Seen During Your Hospitalizations Provider Role Specialty Primary office phone Demarcus Velazquez MD Attending Provider Cardiology [phone] Your Primary Care Physician (PCP) Primary Care Physician Office Phone Office Fax Ruperto Gastelum [phone] Follow-up Information Follow up With Details Comments Contact Info [place] [address] ([facility])   [address] [place] [id number] 
[phone] Judy Madsen MD Schedule an appointment as soon as possible for a visit  [address] [address] 
[phone] Demarcus Velazquez MD On 2017 Follow up with Dr. Rebecca Lofton on May 12th at 2:15pm in the [place] office [address] [facility] [address] 
[phone] Your Appointments Friday May 12, 2017  2:15 PM EDT TRANSITIONAL CARE MANAGEMENT with Patsy Mcknight MD  
[address] Cardiology ([address]) [address] 
[address] [facility]  
[phone] Thursday June 01, 2017  2:40 PM EDT HOSPITAL with [facility] ([facility]) [address] [place] [address] [facility]  
[phone] Current Discharge Medication List  
  
START taking these medications Dose & Instructions Dispensing Information Comments Morning Noon Evening Bedtime  
 furosemide 40 mg tablet Commonly known as:  LASIX Your last dose was: Your next dose is:    
   
   
 Dose:  40 mg Take 1 Tab by mouth daily. Quantity:  30 Tab Refills:  6  
     
   
   
   
  
 metoprolol succinate 50 mg XL tablet Commonly known as:  TOPROL-XL Your last dose was: Your next dose is:    
   
   
 Dose:  50 mg Take 1 Tab by mouth two (2) times a day. Quantity:  60 Tab Refills:  6 CONTINUE these medications which have CHANGED Dose & Instructions Dispensing Information Comments Morning Noon Evening Bedtime  
 apixaban 2.5 mg tablet Commonly known as:  Varsha Corners What changed:   
- medication strength 
- how much to take Your last dose was: Your next dose is:    
   
   
 Dose:  2.5 mg Take 1 Tab by mouth every twelve (12) hours. Quantity:  60 Tab Refills:  11 CONTINUE these medications which have NOT CHANGED Dose & Instructions Dispensing Information Comments Morning Noon Evening Bedtime  
 acetaminophen 650 mg CR tablet Commonly known as:  TYLENOL Your last dose was: Your next dose is:    
   
   
 Dose:  650 mg Take 1 Tab by mouth every eight (8) hours. 2 TABS Quantity:  90 Tab Refills:  11  
     
   
   
   
  
 amiodarone 200 mg tablet Commonly known as:  CORDARONE Your last dose was: Your next dose is:    
   
   
 Dose:  200 mg Take 1 Tab by mouth every twelve (12) hours. Quantity:  60 Tab Refills:  11  
     
   
   
   
  
 atorvastatin 40 mg tablet Commonly known as:  LIPITOR Your last dose was: Your next dose is:    
   
   
 Dose:  40 mg Take 1 Tab by mouth nightly. Quantity:  90 Tab Refills:  3 BREO ELLIPTA 100-25 mcg/dose inhaler Generic drug:  fluticasone-vilanterol Your last dose was: Your next dose is:    
   
   
 Dose:  1 Puff Take 1 Puff by inhalation daily. Refills:  0  
     
   
   
   
  
 CALCIUM-VITAMIN D2 PO Your last dose was: Your next dose is: Take  by mouth three (3) times daily. Refills:  0 CERTAVITE SENIOR-ANTIOXIDANT 0.4-300-250 mg-mcg-mcg Tab Generic drug:  multivit-min-FA-lycopen-lutein Your last dose was: Your next dose is: Take 1 Tab by mouth daily. Quantity:  90 Tab Refills:  3  
     
   
   
   
  
 docusate sodium 100 mg capsule Commonly known as:  Roman Gibbons Your last dose was: Your next dose is:    
   
   
 Dose:  100 mg Take 1 Cap by mouth two (2) times a day. Quantity:  60 Cap Refills:  11  
     
   
   
   
  
 levalbuterol 0.63 mg/3 mL Nebu Commonly known as:  Rosmery Mora Your last dose was: Your next dose is:    
   
   
 Dose:  0.63 mg  
3 mL by Nebulization route every six (6) hours as needed. Quantity:  [address] Refills:  11  
     
   
   
   
  
 loratadine 10 mg tablet Commonly known as:  Moi Giron Your last dose was: Your next dose is:    
   
   
 Dose:  10 mg Take 1 Tab by mouth daily. Quantity:  90 Tab Refills:  3  
     
   
   
   
  
 pantoprazole 20 mg tablet Commonly known as:  PROTONIX Your last dose was: Your next dose is: Dose:  20 mg Take 1 Tab by mouth daily. Indications: HEARTBURN Quantity:  90 Tab Refills:  2  
     
   
   
   
  
 sodium chloride 0.65 % nasal spray Commonly known as:  OCEAN Your last dose was: Your next dose is:    
   
   
 Dose:  1 Spray 1 [place] by Both Nostrils route as needed for Congestion (Keep at bedside for PRN use.). Quantity:  15 mL Refills:  11 STOP taking these medications ENTERIC COATED ASPIRIN PO  
   
  
 metoprolol tartrate 25 mg tablet Commonly known as:  LOPRESSOR  
   
  
 niacin  mg tablet Commonly known as:  Niaspan Where to Get Your Medications Information on where to get these meds will be given to you by the nurse or doctor. ! Ask your nurse or doctor about these medications  
  apixaban 2.5 mg tablet  
 furosemide 40 mg tablet  
 metoprolol succinate 50 mg XL tablet Discharge Instructions Atrial Fibrillation: Care Instructions Your Care Instructions Atrial fibrillation is an irregular and often fast heartbeat. Treating this condition is important for several reasons. It can cause blood clots, which can travel from your heart to your brain and cause a stroke. If you have a fast heartbeat, you may feel lightheaded, dizzy, and weak. An irregular heartbeat can also increase your risk for heart failure. Atrial fibrillation is often the result of another heart condition, such as high blood pressure or coronary artery disease. Making changes to improve your heart condition will help you stay healthy and active. Follow-up care is a key part of your treatment and safety. Be sure to make and go to all appointments, and call your doctor if you are having problems. It's also a good idea to know your test results and keep a list of the medicines you take. How can you care for yourself at home? Medicines · Take your medicines exactly as prescribed. Call your doctor if you think you are having a problem with your medicine. You will get more details on the specific medicines your doctor prescribes. · If your doctor has given you a blood thinner to prevent a stroke, be sure you get instructions about how to take your medicine safely. Blood thinners can cause serious bleeding problems. · Do not take any vitamins, over-the-counter drugs, or herbal products without talking to your doctor first. 
Lifestyle changes · Do not smoke. Smoking can increase your chance of a stroke and heart attack. If you need help quitting, talk to your doctor about stop-smoking programs and medicines. These can increase your chances of quitting for good. · Eat a heart-healthy diet. · Stay at a healthy weight. Lose weight if you need to. · Limit alcohol to 2 drinks a day for men and 1 drink a day for women. Too much alcohol can cause health problems. · Avoid colds and flu. Get a pneumococcal vaccine shot. If you have had one before, ask your doctor whether you need another dose. Get a flu shot every year. If you must be around people with colds or flu, wash your hands often. Activity · If your doctor recommends it, get more exercise. Walking is a good choice. Bit by bit, increase the amount you walk every day. Try for at least 30 minutes on most days of the week. You also may want to swim, bike, or do other activities. Your doctor may suggest that you join a cardiac rehabilitation program so that you can have help increasing your physical activity safely. · Start light exercise if your doctor says it is okay. Even a small amount will help you get stronger, have more energy, and manage stress. Walking is an easy way to get exercise. Start out by walking a little more than you did in the hospital. Gradually increase the amount you walk. · When you exercise, watch for signs that your heart is working too hard. You are pushing too hard if you cannot talk while you are exercising. If you become short of breath or dizzy or have chest pain, sit down and rest immediately. · Check your pulse regularly. Place two fingers on the artery at the palm side of your wrist, in line with your thumb. If your heartbeat seems uneven or fast, talk to your doctor. When should you call for help? Call 911 anytime you think you may need emergency care. For example, call if: 
· You have symptoms of a heart attack. These may include: ¨ Chest pain or pressure, or a strange feeling in the chest. 
¨ Sweating. ¨ Shortness of breath. ¨ Nausea or vomiting. ¨ Pain, pressure, or a strange feeling in the back, neck, jaw, or upper belly or in one or both shoulders or arms. ¨ Lightheadedness or sudden weakness. ¨ A fast or irregular heartbeat. After you call 911, the  may tell you to chew 1 adult-strength or 2 to 4 low-dose aspirin. Wait for an ambulance. Do not try to drive yourself. · You have symptoms of a stroke. These may include: 
¨ Sudden numbness, tingling, weakness, or loss of movement in your face, arm, or leg, especially on only one side of your body. ¨ Sudden vision changes. ¨ Sudden trouble speaking. ¨ Sudden confusion or trouble understanding simple statements. ¨ Sudden problems with walking or balance. ¨ A sudden, severe headache that is different from past headaches. · You passed out (lost consciousness). Call your doctor now or seek immediate medical care if: 
· You have new or increased shortness of breath. · You feel dizzy or lightheaded, or you feel like you may faint. · Your heart rate becomes irregular. · You can feel your heart flutter in your chest or skip heartbeats. Tell your doctor if these symptoms are new or worse. Watch closely for changes in your health, and be sure to contact your doctor if you have any problems. Where can you learn more? Go to http://maria m.info/. Enter U020 in the search box to learn more about \"Atrial Fibrillation: Care Instructions. \" Current as of: January 27, 2016 Content Version: 11.2 © 2675-4235 [employer]. Care instructions adapted under license by [employer] (which disclaims liability or warranty for this information). If you have questions about a medical condition or this instruction, always ask your healthcare professional. [address] any warranty or liability for your use of this information. DISCHARGE SUMMARY from Nurse The following personal items are in your possession at time of discharge: 
 
Dental Appliances: None Visual Aid: Glasses Home Medications: None Jewelry: None Clothing: At bedside Other Valuables: None PATIENT INSTRUCTIONS: 
 
 
F-face looks uneven A-arms unable to move or move unevenly S-speech slurred or non-existent T-time-call 911 as soon as signs and symptoms begin-DO NOT go Back to bed or wait to see if you get better-TIME IS BRAIN. Warning Signs of HEART ATTACK Call 911 if you have these symptoms: 
? Chest discomfort. Most heart attacks involve discomfort in the center of the chest that lasts more than a few minutes, or that goes away and comes back. It can feel like uncomfortable pressure, squeezing, fullness, or pain. ? Discomfort in other areas of the upper body. Symptoms can include pain or discomfort in one or both arms, the back, neck, jaw, or stomach. ? Shortness of breath with or without chest discomfort. ? Other signs may include breaking out in a cold sweat, nausea, or lightheadedness. Don't wait more than five minutes to call [address]! Fast action can save your life. Calling 911 is almost always the fastest way to get lifesaving treatment.  Emergency Medical Services staff can begin treatment when they arrive  up to an hour sooner than if someone gets to the hospital by car. The discharge information has been reviewed with the patient. The patient verbalized understanding. Discharge medications reviewed with the patient and appropriate educational materials and side effects teaching were provided. Discharge Orders None ACO Transitions of Care Introducing [employer] [address]uluaga offers a voluntary care coordination program to provide high quality service and care to [facility] fee-for-service beneficiaries. Naman Lopez was designed to help you enhance your health and well-being through the following services: ? Transitions of Care  support for individuals who are transitioning from one care setting to another (example: Hospital to home). ? Chronic and Complex Care Coordination  support for individuals and caregivers of those with serious or chronic illnesses or with more than one chronic (ongoing) condition and those who take a number of different medications. If you meet specific medical criteria, a [facility] [address] may call you directly to coordinate your care with your primary care physician and your other care providers. For questions about the [facility] programs, please, contact your physicians office. For general questions or additional information about Accountable Care Organizations: 
Please visit www.medicare.gov/acos. html or call 1-800-MEDICARE (1-211.534.6564) TTY users should call 5-552.246.3803. [employer] Announcement We are excited to announce that we are making your provider's discharge notes available to you in [employer]. You will see these notes when they are completed and signed by the physician that discharged you from your recent hospital stay.   If you have any questions or concerns about any information you see in [facility], please call the Health Information Department where you were seen or reach out to your Primary Care Provider for more information about your plan of care. Introducing \[facility]\"" & HEALTH SERVICES! Mardebbielauro Bria introduces [facility] patient portal. Now you can access parts of your medical record, email your doctor's office, and request medication refills online. 1. In your internet browser, go to [email or web address] 2. Click on the First Time User? Click Here link in the Sign In box. You will see the New Member Sign Up page. 3. Enter your [facility] Access Code exactly as it appears below. You will not need to use this code after youve completed the sign-up process. If you do not sign up before the expiration date, you must request a new code. · [facility] Access Code: [email or web address] Expires: 7/5/2017  4:25 PM 
 
4. Enter the last four digits of your Social Security Number (xxxx) and Date of Birth (mm/dd/yyyy) as indicated and click Submit. You will be taken to the next sign-up page. 5. Create a [facility] ID. This will be your [facility] login ID and cannot be changed, so think of one that is secure and easy to remember. 6. Create a [facility] password. You can change your password at any time. 7. Enter your Password Reset Question and Answer. This can be used at a later time if you forget your password. 8. Enter your e-mail address. You will receive e-mail notification when new information is available in [address] 9. Click Sign Up. You can now view and download portions of your medical record. 10. Click the Download Summary menu link to download a portable copy of your medical information. If you have questions, please visit the Frequently Asked Questions section of the [facility] website. Remember, [facility] is NOT to be used for urgent needs. For medical emergencies, dial 911. Now available from your iPhone and Android! General Information Please provide this summary of care documentation to your next provider. Patient Signature:  ____________________________________________________________ Date:  ____________________________________________________________  
  
Shila Hawk Provider Signature:  ____________________________________________________________ Date:  ____________________________________________________________

## 2017-04-28 NOTE — IP AVS SNAPSHOT
Current Discharge Medication List  
  
START taking these medications Dose & Instructions Dispensing Information Comments Morning Noon Evening Bedtime  
 furosemide 40 mg tablet Commonly known as:  LASIX Your last dose was: Your next dose is:    
   
   
 Dose:  40 mg Take 1 Tab by mouth daily. Quantity:  30 Tab Refills:  6  
     
   
   
   
  
 metoprolol succinate 50 mg XL tablet Commonly known as:  TOPROL-XL Your last dose was: Your next dose is:    
   
   
 Dose:  50 mg Take 1 Tab by mouth two (2) times a day. Quantity:  60 Tab Refills:  6 CONTINUE these medications which have CHANGED Dose & Instructions Dispensing Information Comments Morning Noon Evening Bedtime  
 apixaban 2.5 mg tablet Commonly known as:  Alberta Sarbjit What changed:   
- medication strength 
- how much to take Your last dose was: Your next dose is:    
   
   
 Dose:  2.5 mg Take 1 Tab by mouth every twelve (12) hours. Quantity:  60 Tab Refills:  11 CONTINUE these medications which have NOT CHANGED Dose & Instructions Dispensing Information Comments Morning Noon Evening Bedtime  
 acetaminophen 650 mg CR tablet Commonly known as:  TYLENOL Your last dose was: Your next dose is:    
   
   
 Dose:  650 mg Take 1 Tab by mouth every eight (8) hours. 2 TABS Quantity:  90 Tab Refills:  11  
     
   
   
   
  
 amiodarone 200 mg tablet Commonly known as:  CORDARONE Your last dose was: Your next dose is:    
   
   
 Dose:  200 mg Take 1 Tab by mouth every twelve (12) hours. Quantity:  60 Tab Refills:  11  
     
   
   
   
  
 atorvastatin 40 mg tablet Commonly known as:  LIPITOR Your last dose was: Your next dose is:    
   
   
 Dose:  40 mg Take 1 Tab by mouth nightly. Quantity:  90 Tab Refills:  3 BREO ELLIPTA 100-25 mcg/dose inhaler Generic drug:  fluticasone-vilanterol Your last dose was: Your next dose is:    
   
   
 Dose:  1 Puff Take 1 Puff by inhalation daily. Refills:  0  
     
   
   
   
  
 CALCIUM-VITAMIN D2 PO Your last dose was: Your next dose is: Take  by mouth three (3) times daily. Refills:  0 CERTAVITE SENIOR-ANTIOXIDANT 0.4-300-250 mg-mcg-mcg Tab Generic drug:  multivit-min-FA-lycopen-lutein Your last dose was: Your next dose is: Take 1 Tab by mouth daily. Quantity:  90 Tab Refills:  3  
     
   
   
   
  
 docusate sodium 100 mg capsule Commonly known as:  Dennis Po Your last dose was: Your next dose is:    
   
   
 Dose:  100 mg Take 1 Cap by mouth two (2) times a day. Quantity:  60 Cap Refills:  11  
     
   
   
   
  
 levalbuterol 0.63 mg/3 mL Nebu Commonly known as:  Clementina Womackten Your last dose was: Your next dose is:    
   
   
 Dose:  0.63 mg  
3 mL by Nebulization route every six (6) hours as needed. Quantity:  [address] Refills:  11  
     
   
   
   
  
 loratadine 10 mg tablet Commonly known as:  Mac Bettie Your last dose was: Your next dose is:    
   
   
 Dose:  10 mg Take 1 Tab by mouth daily. Quantity:  90 Tab Refills:  3  
     
   
   
   
  
 pantoprazole 20 mg tablet Commonly known as:  PROTONIX Your last dose was: Your next dose is:    
   
   
 Dose:  20 mg Take 1 Tab by mouth daily. Indications: HEARTBURN Quantity:  90 Tab Refills:  2  
     
   
   
   
  
 sodium chloride 0.65 % nasal spray Commonly known as:  OCEAN Your last dose was: Your next dose is:    
   
   
 Dose:  1 Spray 1 [place] by Both Nostrils route as needed for Congestion (Keep at bedside for PRN use.). Quantity:  15 mL Refills:  11  
     
   
   
   
  
  
 STOP taking these medications ENTERIC COATED ASPIRIN PO  
   
  
 metoprolol tartrate 25 mg tablet Commonly known as:  LOPRESSOR  
   
  
 niacin  mg tablet Commonly known as:  Niaspan Where to Get Your Medications Information on where to get these meds will be given to you by the nurse or doctor. ! Ask your nurse or doctor about these medications  
  apixaban 2.5 mg tablet  
 furosemide 40 mg tablet  
 metoprolol succinate 50 mg XL tablet

## 2017-04-29 ENCOUNTER — APPOINTMENT (OUTPATIENT)
Dept: GENERAL RADIOLOGY | Age: 82
DRG: 291 | End: 2017-04-29
Attending: INTERNAL MEDICINE
Payer: COMMERCIAL

## 2017-04-29 LAB
ANION GAP BLD CALC-SCNC: 8 MMOL/L (ref 7–16)
BASOPHILS # BLD AUTO: 0 K/UL (ref 0–0.2)
BASOPHILS # BLD: 0 % (ref 0–2)
BUN SERPL-MCNC: 25 MG/DL (ref 8–23)
CALCIUM SERPL-MCNC: 8.5 MG/DL (ref 8.3–10.4)
CHLORIDE SERPL-SCNC: 105 MMOL/L (ref 98–107)
CO2 SERPL-SCNC: 31 MMOL/L (ref 21–32)
CREAT SERPL-MCNC: 1.08 MG/DL (ref 0.6–1)
DIFFERENTIAL METHOD BLD: ABNORMAL
EOSINOPHIL # BLD: 0.1 K/UL (ref 0–0.8)
EOSINOPHIL NFR BLD: 1 % (ref 0.5–7.8)
ERYTHROCYTE [DISTWIDTH] IN BLOOD BY AUTOMATED COUNT: 18.2 % (ref 11.9–14.6)
GLUCOSE SERPL-MCNC: 143 MG/DL (ref 65–100)
HCT VFR BLD AUTO: 34.3 % (ref 35.8–46.3)
HGB BLD-MCNC: 10.9 G/DL (ref 11.7–15.4)
IMM GRANULOCYTES # BLD: 0 K/UL (ref 0–0.5)
IMM GRANULOCYTES NFR BLD AUTO: 0.5 % (ref 0–5)
LYMPHOCYTES # BLD AUTO: 11 % (ref 13–44)
LYMPHOCYTES # BLD: 0.7 K/UL (ref 0.5–4.6)
MCH RBC QN AUTO: 33.2 PG (ref 26.1–32.9)
MCHC RBC AUTO-ENTMCNC: 31.8 G/DL (ref 31.4–35)
MCV RBC AUTO: 104.6 FL (ref 79.6–97.8)
MONOCYTES # BLD: 0.5 K/UL (ref 0.1–1.3)
MONOCYTES NFR BLD AUTO: 8 % (ref 4–12)
NEUTS SEG # BLD: 5.2 K/UL (ref 1.7–8.2)
NEUTS SEG NFR BLD AUTO: 80 % (ref 43–78)
PLATELET # BLD AUTO: 195 K/UL (ref 150–450)
PMV BLD AUTO: 9.7 FL (ref 10.8–14.1)
POTASSIUM SERPL-SCNC: 4.7 MMOL/L (ref 3.5–5.1)
RBC # BLD AUTO: 3.28 M/UL (ref 4.05–5.25)
SODIUM SERPL-SCNC: 144 MMOL/L (ref 136–145)
TROPONIN I SERPL-MCNC: 0.05 NG/ML (ref 0.02–0.05)
TROPONIN I SERPL-MCNC: 0.05 NG/ML (ref 0.02–0.05)
TROPONIN I SERPL-MCNC: 0.06 NG/ML (ref 0.02–0.05)
WBC # BLD AUTO: 6.6 K/UL (ref 4.3–11.1)

## 2017-04-29 PROCEDURE — 77010033678 HC OXYGEN DAILY

## 2017-04-29 PROCEDURE — 74011250637 HC RX REV CODE- 250/637: Performed by: INTERNAL MEDICINE

## 2017-04-29 PROCEDURE — 74011250636 HC RX REV CODE- 250/636: Performed by: NURSE PRACTITIONER

## 2017-04-29 PROCEDURE — 74011000250 HC RX REV CODE- 250: Performed by: INTERNAL MEDICINE

## 2017-04-29 PROCEDURE — 74011250636 HC RX REV CODE- 250/636: Performed by: INTERNAL MEDICINE

## 2017-04-29 PROCEDURE — 85025 COMPLETE CBC W/AUTO DIFF WBC: CPT | Performed by: NURSE PRACTITIONER

## 2017-04-29 PROCEDURE — 84484 ASSAY OF TROPONIN QUANT: CPT | Performed by: NURSE PRACTITIONER

## 2017-04-29 PROCEDURE — 36415 COLL VENOUS BLD VENIPUNCTURE: CPT | Performed by: NURSE PRACTITIONER

## 2017-04-29 PROCEDURE — 94640 AIRWAY INHALATION TREATMENT: CPT

## 2017-04-29 PROCEDURE — 71010 XR CHEST PORT: CPT

## 2017-04-29 PROCEDURE — 94760 N-INVAS EAR/PLS OXIMETRY 1: CPT

## 2017-04-29 PROCEDURE — 99218 HC RM OBSERVATION: CPT

## 2017-04-29 PROCEDURE — 74011250637 HC RX REV CODE- 250/637: Performed by: NURSE PRACTITIONER

## 2017-04-29 PROCEDURE — 80048 BASIC METABOLIC PNL TOTAL CA: CPT | Performed by: NURSE PRACTITIONER

## 2017-04-29 RX ORDER — ACETAMINOPHEN 325 MG/1
650 TABLET ORAL
Status: DISCONTINUED | OUTPATIENT
Start: 2017-04-29 | End: 2017-05-04 | Stop reason: HOSPADM

## 2017-04-29 RX ORDER — METOPROLOL TARTRATE 25 MG/1
37.5 TABLET, FILM COATED ORAL EVERY 6 HOURS
Status: DISCONTINUED | OUTPATIENT
Start: 2017-04-29 | End: 2017-05-04 | Stop reason: HOSPADM

## 2017-04-29 RX ORDER — FUROSEMIDE 10 MG/ML
60 INJECTION INTRAMUSCULAR; INTRAVENOUS EVERY 12 HOURS
Status: DISCONTINUED | OUTPATIENT
Start: 2017-04-29 | End: 2017-05-01

## 2017-04-29 RX ORDER — FUROSEMIDE 10 MG/ML
60 INJECTION INTRAMUSCULAR; INTRAVENOUS EVERY 12 HOURS
Status: DISCONTINUED | OUTPATIENT
Start: 2017-04-29 | End: 2017-04-29

## 2017-04-29 RX ORDER — FUROSEMIDE 10 MG/ML
60 INJECTION INTRAMUSCULAR; INTRAVENOUS 2 TIMES DAILY
Status: DISCONTINUED | OUTPATIENT
Start: 2017-04-29 | End: 2017-04-29

## 2017-04-29 RX ADMIN — AMIODARONE HYDROCHLORIDE 200 MG: 200 TABLET ORAL at 22:29

## 2017-04-29 RX ADMIN — DOCUSATE SODIUM 100 MG: 100 CAPSULE, LIQUID FILLED ORAL at 17:23

## 2017-04-29 RX ADMIN — ALBUTEROL SULFATE 2.5 MG: 2.5 SOLUTION RESPIRATORY (INHALATION) at 07:23

## 2017-04-29 RX ADMIN — BUDESONIDE 500 MCG: 0.5 SUSPENSION RESPIRATORY (INHALATION) at 17:51

## 2017-04-29 RX ADMIN — AMIODARONE HYDROCHLORIDE 200 MG: 200 TABLET ORAL at 08:08

## 2017-04-29 RX ADMIN — FUROSEMIDE 40 MG: 10 INJECTION, SOLUTION INTRAMUSCULAR; INTRAVENOUS at 08:09

## 2017-04-29 RX ADMIN — APIXABAN 5 MG: 5 TABLET, FILM COATED ORAL at 08:08

## 2017-04-29 RX ADMIN — BUDESONIDE 500 MCG: 0.5 SUSPENSION RESPIRATORY (INHALATION) at 07:22

## 2017-04-29 RX ADMIN — METOPROLOL TARTRATE 37.5 MG: 25 TABLET ORAL at 17:23

## 2017-04-29 RX ADMIN — METOPROLOL TARTRATE 25 MG: 25 TABLET ORAL at 06:23

## 2017-04-29 RX ADMIN — APIXABAN 5 MG: 5 TABLET, FILM COATED ORAL at 22:29

## 2017-04-29 RX ADMIN — ATORVASTATIN CALCIUM 40 MG: 40 TABLET, FILM COATED ORAL at 22:29

## 2017-04-29 RX ADMIN — PANTOPRAZOLE SODIUM 40 MG: 40 TABLET, DELAYED RELEASE ORAL at 08:08

## 2017-04-29 RX ADMIN — DOCUSATE SODIUM 100 MG: 100 CAPSULE, LIQUID FILLED ORAL at 08:08

## 2017-04-29 RX ADMIN — ALBUTEROL SULFATE 2.5 MG: 2.5 SOLUTION RESPIRATORY (INHALATION) at 13:17

## 2017-04-29 RX ADMIN — ALBUTEROL SULFATE 2.5 MG: 2.5 SOLUTION RESPIRATORY (INHALATION) at 17:51

## 2017-04-29 RX ADMIN — METOPROLOL TARTRATE 37.5 MG: 25 TABLET ORAL at 11:56

## 2017-04-29 RX ADMIN — Medication 5 ML: at 06:12

## 2017-04-29 RX ADMIN — Medication 10 ML: at 22:00

## 2017-04-29 RX ADMIN — FUROSEMIDE 60 MG: 10 INJECTION, SOLUTION INTRAMUSCULAR; INTRAVENOUS at 17:23

## 2017-04-29 RX ADMIN — ALBUTEROL SULFATE 2.5 MG: 2.5 SOLUTION RESPIRATORY (INHALATION) at 01:16

## 2017-04-29 NOTE — PROGRESS NOTES
[facility] CARDIOLOGY PROGRESS NOTE           4/29/2017 8:20 AM    Admit Date: 4/28/2017    Admit Diagnosis: CHF;Volume overload      Subjective:   No complaints this AM, no chest pain or shortness of breath, family at bedside reports she is doing better this AM    Interval History: (History of pertinent interval events obtained from nursing staff)  Remains in afib with rapid rates, remains short of breath    ROS:  GEN:  No fever or chills  Cardiovascular:  As noted above  Pulmonary:  As noted above  Neuro:  No new focal motor or sensory loss      Objective:     Vitals:    04/29/17 0100 04/29/17 0116 04/29/17 0449 04/29/17 0725   BP: 119/74  126/79    Pulse: (!) 106  (!) 106    Resp: 18  18    Temp: 97.1 °F (36.2 °C)  98.9 °F (37.2 °C)    SpO2: 92% 94% 99% 96%   Weight:   71.6 kg (157 lb 14.4 oz)    Height:           Physical Exam:  General-chronically ill appearing frail elderly female, mild respiratory distress  Neck- supple, elevated JVD  CV- irreg irreg, tachycardic, distant S1, S2  Lung- decreased BS at bases  Abd- soft, nontender, nondistended  Ext- extensive NEL 3-4+ extending above the knee  Skin- warm and dry, pale    Current Facility-Administered Medications   Medication Dose Route Frequency    amiodarone (CORDARONE) tablet 200 mg  200 mg Oral Q12H    apixaban (ELIQUIS) tablet 5 mg  5 mg Oral Q12H    atorvastatin (LIPITOR) tablet 40 mg  40 mg Oral QHS    docusate sodium (COLACE) capsule 100 mg  100 mg Oral BID    levalbuterol (XOPENEX) nebulizer soln 0.63 mg/3 mL  0.63 mg Nebulization Q6H PRN    metoprolol tartrate (LOPRESSOR) tablet 25 mg  25 mg Oral Q6H    pantoprazole (PROTONIX) tablet 40 mg  40 mg Oral DAILY    sodium chloride (OCEAN) 0.65 % nasal spray 1 Spray  1 Spray Both Nostrils PRN    furosemide (LASIX) injection 40 mg  40 mg IntraVENous BID    sodium chloride (NS) flush 5-10 mL  5-10 mL IntraVENous Q8H    sodium chloride (NS) flush 5-10 mL  5-10 mL IntraVENous PRN  morphine injection 2 mg  2 mg IntraVENous Q4H PRN    budesonide (PULMICORT) 500 mcg/2 ml nebulizer suspension  500 mcg Nebulization BID RT    And    albuterol CONCENTRATE 2.5mg/0.5 mL neb soln  2.5 mg Nebulization Q6H RT     Data Review:   Recent Results (from the past 24 hour(s))   METABOLIC PANEL, BASIC    Collection Time: 04/28/17  5:50 PM   Result Value Ref Range    Sodium 143 136 - 145 mmol/L    Potassium 5.3 (H) 3.5 - 5.1 mmol/L    Chloride 105 98 - 107 mmol/L    CO2 34 (H) 21 - 32 mmol/L    Anion gap 4 (L) 7 - 16 mmol/L    Glucose 159 (H) 65 - 100 mg/dL    BUN 27 (H) 8 - 23 MG/DL    Creatinine 1.16 (H) 0.6 - 1.0 MG/DL    GFR est AA 56 (L) >60 ml/min/1.73m2    GFR est non-AA 47 (L) >60 ml/min/1.73m2    Calcium 8.5 8.3 - 10.4 MG/DL   TROPONIN I    Collection Time: 04/28/17  5:50 PM   Result Value Ref Range    Troponin-I, Qt. 0.05 0.02 - 0.05 NG/ML   CBC WITH AUTOMATED DIFF    Collection Time: 04/28/17  5:50 PM   Result Value Ref Range    WBC 8.3 4.3 - 11.1 K/uL    RBC 3.32 (L) 4.05 - 5.25 M/uL    HGB 10.9 (L) 11.7 - 15.4 g/dL    HCT 34.7 (L) 35.8 - 46.3 %    .5 (H) 79.6 - 97.8 FL    MCH 32.8 26.1 - 32.9 PG    MCHC 31.4 31.4 - 35.0 g/dL    RDW 18.5 (H) 11.9 - 14.6 %    PLATELET 934 031 - 952 K/uL    MPV 9.8 (L) 10.8 - 14.1 FL    DF AUTOMATED      NEUTROPHILS 90 (H) 43 - 78 %    LYMPHOCYTES 5 (L) 13 - 44 %    MONOCYTES 5 4.0 - 12.0 %    EOSINOPHILS 0 (L) 0.5 - 7.8 %    BASOPHILS 0 0.0 - 2.0 %    IMMATURE GRANULOCYTES 0.4 0.0 - 5.0 %    ABS. NEUTROPHILS 7.4 1.7 - 8.2 K/UL    ABS. LYMPHOCYTES 0.4 (L) 0.5 - 4.6 K/UL    ABS. MONOCYTES 0.4 0.1 - 1.3 K/UL    ABS. EOSINOPHILS 0.0 0.0 - 0.8 K/UL    ABS. BASOPHILS 0.0 0.0 - 0.2 K/UL    ABS. IMM.  GRANS. 0.0 0.0 - 0.5 K/UL   METABOLIC PANEL, BASIC    Collection Time: 04/29/17 12:55 AM   Result Value Ref Range    Sodium 144 136 - 145 mmol/L    Potassium 4.7 3.5 - 5.1 mmol/L    Chloride 105 98 - 107 mmol/L    CO2 31 21 - 32 mmol/L    Anion gap 8 7 - 16 mmol/L Glucose 143 (H) 65 - 100 mg/dL    BUN 25 (H) 8 - 23 MG/DL    Creatinine 1.08 (H) 0.6 - 1.0 MG/DL    GFR est AA >60 >60 ml/min/1.73m2    GFR est non-AA 51 (L) >60 ml/min/1.73m2    Calcium 8.5 8.3 - 10.4 MG/DL   TROPONIN I    Collection Time: 04/29/17 12:55 AM   Result Value Ref Range    Troponin-I, Qt. 0.05 0.02 - 0.05 NG/ML   CBC WITH AUTOMATED DIFF    Collection Time: 04/29/17 12:55 AM   Result Value Ref Range    WBC 6.6 4.3 - 11.1 K/uL    RBC 3.28 (L) 4.05 - 5.25 M/uL    HGB 10.9 (L) 11.7 - 15.4 g/dL    HCT 34.3 (L) 35.8 - 46.3 %    .6 (H) 79.6 - 97.8 FL    MCH 33.2 (H) 26.1 - 32.9 PG    MCHC 31.8 31.4 - 35.0 g/dL    RDW 18.2 (H) 11.9 - 14.6 %    PLATELET 029 639 - 803 K/uL    MPV 9.7 (L) 10.8 - 14.1 FL    DF AUTOMATED      NEUTROPHILS 80 (H) 43 - 78 %    LYMPHOCYTES 11 (L) 13 - 44 %    MONOCYTES 8 4.0 - 12.0 %    EOSINOPHILS 1 0.5 - 7.8 %    BASOPHILS 0 0.0 - 2.0 %    IMMATURE GRANULOCYTES 0.5 0.0 - 5.0 %    ABS. NEUTROPHILS 5.2 1.7 - 8.2 K/UL    ABS. LYMPHOCYTES 0.7 0.5 - 4.6 K/UL    ABS. MONOCYTES 0.5 0.1 - 1.3 K/UL    ABS. EOSINOPHILS 0.1 0.0 - 0.8 K/UL    ABS. BASOPHILS 0.0 0.0 - 0.2 K/UL    ABS. IMM. GRANS. 0.0 0.0 - 0.5 K/UL       EKG:  (EKG has been independently visualized by me with interpretation below)  Assessment:     Principal Problem:    Volume overload (4/28/2017)    Active Problems:    Hypertension (7/1/2015)      Hyperlipidemia (7/1/2015)      PE (pulmonary thromboembolism) ([facility] [address] (4/4/2017)      Atrial fibrillation ([facility] [address] (4/4/2017)      Do not resuscitate (4/7/2017)      Plan:   1. Volume overload, uncontrolled: will increase lasix to 60mg IV Q12H, continue diruesis, creatinine stable, difficult to get accurate I and Os, continue to monitor weights and creatinine, will check CXR today, BNP tomorrow AM with AM labs  2. Atrial fibrillation, uncontrolled: continue eliquis, increase metoprolol to 37.5mg Q6H for improved rate control, continue amiodarone  3.  PE: cont eliquis, currently stable  4. HTN: blood pressure stable, cont meds  5. PPX: on eliquis  6. DNR    Silvano Garibay.  Bull SCHAEFFER  Cardiology/Electrophysiology

## 2017-04-29 NOTE — PROGRESS NOTES
Bedside and Verbal shift change report given to Caryl Ornelas RN (oncoming nurse) by self Creig Night nurse). Report included the following information SBAR, Kardex, MAR and Recent Results.

## 2017-04-29 NOTE — PROGRESS NOTES
Bedside and Verbal shift change report given to SHELLIE Roy (oncoming nurse) by Catherine Mixon RN (offgoing nurse). Report included the following information SBAR, Kardex, MAR and Recent Results.

## 2017-04-29 NOTE — PROGRESS NOTES
Problem: Pressure Injury - Risk of  Goal: *Prevention of pressure ulcer  Outcome: Progressing Towards Goal    04/28/17 2000   Wound Prevention and Protection Methods   Orientation of Wound Prevention Mid;Posterior   Location of Wound Prevention Sacrum/Coccyx   Dressing Present  Yes; Intact, not due to be changed   Dressing Status Intact   Read Only, Retired: Wound Treatment (non-mechanical)   Wound Offloading (Prevention Methods) Bed, pressure reduction mattress

## 2017-04-29 NOTE — PROGRESS NOTES
1600pm Pt will require an updated ins precert to return to SNF for continued care. Pt/Ot evals requested. Will forward to [facility] SNF when available. Pt admitted from [facility] where she was receiving rehab. She was transferred there from [facility] on 4/14/17 after insurance approval for rehab. Per daughter pt will need long term care at [facility] after rehab. [facility] has left message for [facility] requesting information about pt bedhold and if a new precert will be needed for pt to return to SNF. Will follow up      Medicare Outpatient Observation Notice provided to the patient/daughter. Oral explanation was provided and all questions answered. Signed document placed in the medical record under media tab. Copy to patient.

## 2017-04-30 LAB
ANION GAP BLD CALC-SCNC: 8 MMOL/L (ref 7–16)
BASOPHILS # BLD AUTO: 0 K/UL (ref 0–0.2)
BASOPHILS # BLD: 0 % (ref 0–2)
BNP SERPL-MCNC: 1016 PG/ML
BUN SERPL-MCNC: 21 MG/DL (ref 8–23)
CALCIUM SERPL-MCNC: 8.2 MG/DL (ref 8.3–10.4)
CHLORIDE SERPL-SCNC: 101 MMOL/L (ref 98–107)
CO2 SERPL-SCNC: 36 MMOL/L (ref 21–32)
CREAT SERPL-MCNC: 1.05 MG/DL (ref 0.6–1)
DIFFERENTIAL METHOD BLD: ABNORMAL
EOSINOPHIL # BLD: 0.1 K/UL (ref 0–0.8)
EOSINOPHIL NFR BLD: 2 % (ref 0.5–7.8)
ERYTHROCYTE [DISTWIDTH] IN BLOOD BY AUTOMATED COUNT: 18.3 % (ref 11.9–14.6)
GLUCOSE SERPL-MCNC: 114 MG/DL (ref 65–100)
HCT VFR BLD AUTO: 33.1 % (ref 35.8–46.3)
HGB BLD-MCNC: 10.5 G/DL (ref 11.7–15.4)
IMM GRANULOCYTES # BLD: 0 K/UL (ref 0–0.5)
IMM GRANULOCYTES NFR BLD AUTO: 0.4 % (ref 0–5)
LYMPHOCYTES # BLD AUTO: 11 % (ref 13–44)
LYMPHOCYTES # BLD: 0.6 K/UL (ref 0.5–4.6)
MCH RBC QN AUTO: 32.6 PG (ref 26.1–32.9)
MCHC RBC AUTO-ENTMCNC: 31.7 G/DL (ref 31.4–35)
MCV RBC AUTO: 102.8 FL (ref 79.6–97.8)
MONOCYTES # BLD: 0.5 K/UL (ref 0.1–1.3)
MONOCYTES NFR BLD AUTO: 10 % (ref 4–12)
NEUTS SEG # BLD: 4.1 K/UL (ref 1.7–8.2)
NEUTS SEG NFR BLD AUTO: 77 % (ref 43–78)
PLATELET # BLD AUTO: 208 K/UL (ref 150–450)
PMV BLD AUTO: 9.4 FL (ref 10.8–14.1)
POTASSIUM SERPL-SCNC: 4.2 MMOL/L (ref 3.5–5.1)
RBC # BLD AUTO: 3.22 M/UL (ref 4.05–5.25)
SODIUM SERPL-SCNC: 145 MMOL/L (ref 136–145)
TROPONIN I SERPL-MCNC: 0.06 NG/ML (ref 0.02–0.05)
WBC # BLD AUTO: 5.3 K/UL (ref 4.3–11.1)

## 2017-04-30 PROCEDURE — 77030019605

## 2017-04-30 PROCEDURE — 83880 ASSAY OF NATRIURETIC PEPTIDE: CPT | Performed by: INTERNAL MEDICINE

## 2017-04-30 PROCEDURE — 85025 COMPLETE CBC W/AUTO DIFF WBC: CPT | Performed by: NURSE PRACTITIONER

## 2017-04-30 PROCEDURE — 74011000250 HC RX REV CODE- 250: Performed by: INTERNAL MEDICINE

## 2017-04-30 PROCEDURE — 80048 BASIC METABOLIC PNL TOTAL CA: CPT | Performed by: NURSE PRACTITIONER

## 2017-04-30 PROCEDURE — 94640 AIRWAY INHALATION TREATMENT: CPT

## 2017-04-30 PROCEDURE — 97162 PT EVAL MOD COMPLEX 30 MIN: CPT

## 2017-04-30 PROCEDURE — 65660000000 HC RM CCU STEPDOWN

## 2017-04-30 PROCEDURE — 94760 N-INVAS EAR/PLS OXIMETRY 1: CPT

## 2017-04-30 PROCEDURE — 99218 HC RM OBSERVATION: CPT

## 2017-04-30 PROCEDURE — G8979 MOBILITY GOAL STATUS: HCPCS

## 2017-04-30 PROCEDURE — 74011250636 HC RX REV CODE- 250/636: Performed by: INTERNAL MEDICINE

## 2017-04-30 PROCEDURE — 74011250637 HC RX REV CODE- 250/637: Performed by: NURSE PRACTITIONER

## 2017-04-30 PROCEDURE — 77010033678 HC OXYGEN DAILY

## 2017-04-30 PROCEDURE — G8978 MOBILITY CURRENT STATUS: HCPCS

## 2017-04-30 PROCEDURE — 74011250637 HC RX REV CODE- 250/637: Performed by: INTERNAL MEDICINE

## 2017-04-30 RX ADMIN — FUROSEMIDE 60 MG: 10 INJECTION, SOLUTION INTRAMUSCULAR; INTRAVENOUS at 05:54

## 2017-04-30 RX ADMIN — DOCUSATE SODIUM 100 MG: 100 CAPSULE, LIQUID FILLED ORAL at 16:59

## 2017-04-30 RX ADMIN — APIXABAN 5 MG: 5 TABLET, FILM COATED ORAL at 21:36

## 2017-04-30 RX ADMIN — ATORVASTATIN CALCIUM 40 MG: 40 TABLET, FILM COATED ORAL at 21:36

## 2017-04-30 RX ADMIN — ALBUTEROL SULFATE 2.5 MG: 2.5 SOLUTION RESPIRATORY (INHALATION) at 07:18

## 2017-04-30 RX ADMIN — ALBUTEROL SULFATE 2.5 MG: 2.5 SOLUTION RESPIRATORY (INHALATION) at 01:38

## 2017-04-30 RX ADMIN — ALBUTEROL SULFATE 2.5 MG: 2.5 SOLUTION RESPIRATORY (INHALATION) at 19:09

## 2017-04-30 RX ADMIN — FUROSEMIDE 60 MG: 10 INJECTION, SOLUTION INTRAMUSCULAR; INTRAVENOUS at 16:58

## 2017-04-30 RX ADMIN — AMIODARONE HYDROCHLORIDE 200 MG: 200 TABLET ORAL at 21:36

## 2017-04-30 RX ADMIN — METOPROLOL TARTRATE 37.5 MG: 25 TABLET ORAL at 06:44

## 2017-04-30 RX ADMIN — METOPROLOL TARTRATE 37.5 MG: 25 TABLET ORAL at 00:25

## 2017-04-30 RX ADMIN — METOPROLOL TARTRATE 37.5 MG: 25 TABLET ORAL at 16:58

## 2017-04-30 RX ADMIN — BUDESONIDE 500 MCG: 0.5 SUSPENSION RESPIRATORY (INHALATION) at 19:09

## 2017-04-30 RX ADMIN — APIXABAN 5 MG: 5 TABLET, FILM COATED ORAL at 08:03

## 2017-04-30 RX ADMIN — Medication 5 ML: at 21:36

## 2017-04-30 RX ADMIN — METOPROLOL TARTRATE 37.5 MG: 25 TABLET ORAL at 11:57

## 2017-04-30 RX ADMIN — PANTOPRAZOLE SODIUM 40 MG: 40 TABLET, DELAYED RELEASE ORAL at 08:03

## 2017-04-30 RX ADMIN — ALBUTEROL SULFATE 2.5 MG: 2.5 SOLUTION RESPIRATORY (INHALATION) at 13:41

## 2017-04-30 RX ADMIN — AMIODARONE HYDROCHLORIDE 200 MG: 200 TABLET ORAL at 08:03

## 2017-04-30 RX ADMIN — DOCUSATE SODIUM 100 MG: 100 CAPSULE, LIQUID FILLED ORAL at 08:03

## 2017-04-30 RX ADMIN — BUDESONIDE 500 MCG: 0.5 SUSPENSION RESPIRATORY (INHALATION) at 07:18

## 2017-04-30 NOTE — PROGRESS NOTES
Problem: Mobility Impaired (Adult and Pediatric)  Goal: *Acute Goals and Plan of Care (Insert Text)  LTG:  (1.)Ms. Isac Medley will move from supine to sit and sit to supine and roll side to side in bed with siderails with MODIFIED INDEPENDENCE within 7 day(s). (2.)Ms. Isac Medley will perform all functional transfers with SUPERVISION using the least restrictive/no device within 7 day(s). (3.)Ms. Isac Medley will ambulate with STAND BY ASSIST for 150 feet with normal vital sign response with the least restrictive device within 7 day(s). PHYSICAL THERAPY: INITIAL ASSESSMENT 4/30/2017  OBSERVATION: Hospital Day: 3  Payor: [employer] VIP CARE PLUS / [place] [employer] [place] / Product Type: [employer] Care Medicare /      NAME/AGE/GENDER: Krista Bruno is a 80 y.o. female  PRIMARY DIAGNOSIS: CHF  Volume overload Volume overload Volume overload        ICD-10: Treatment Diagnosis:       · Other abnormalities of gait and mobility (R26.89)   Precaution/Allergies:  Review of patient's allergies indicates no known allergies. ASSESSMENT:      Ms. Isac Medley presents supine in bed with attentive daughter at bedside, . She was very sweet and agreeable for PT assessment. She transitioned to sit with CGA to minimal assist.  Stood with CGA and ambulated 14' with RW and CGA to minimal assist and cueing to stay close to RW. She had very flexed posture. HR remained in one teens throughout PT. Patient has multiple medical problems and has declined in functional mobility recently-was living alone. Per daughter patient was undergoing rehab prior to this admission and her progress waxed and waned. Ms. Isac Medley would benefit from skilled physical therapy (medically necessary) to address her deficits and maximize her function. Per daughter, plans are for patient to transition to LTC after rehab.          This section established at most recent assessment   PROBLEM LIST (Impairments causing functional limitations):  1. Decreased ADL/Functional Activities  2. Decreased Transfer Abilities  3. Decreased Ambulation Ability/Technique  4. Decreased Balance  5. Decreased Activity Tolerance  6. Increased Fatigue    INTERVENTIONS PLANNED: (Benefits and precautions of physical therapy have been discussed with the patient.)  1. Balance Exercise  2. Bed Mobility  3. Gait Training  4. Therapeutic Activites  5. Therapeutic Exercise/Strengthening  6. Transfer Training  7. education  8. Group Therapy      TREATMENT PLAN: Frequency/Duration: 3 times a week for duration of hospital stay  Rehabilitation Potential For Stated Goals: EXCELLENT      RECOMMENDED REHABILITATION/EQUIPMENT: (at time of discharge pending progress): Continue Skilled Therapy. HISTORY:   History of Present Injury/Illness (Reason for Referral):  Per MD note, \"History of persistent atrial fibrillation. Noted when patient presented to the hospital with a PE. Echo at that time with noted moderate left ventricular systolic dysfunction with ejection fraction of 30-35% [4/17]. Also presented with acute renal failure/anemia in patient with improving renal function on discharge with creatinine noted at 1.42     Poor historian and no family around today. Appears dyspnea at rest. States issues would likely PND/orthopnea and coughing. Uncertain about weight gain but significant edema noted. Past Medical History, Past Surgical History, Family history, Social History, and Medications were all reviewed with the patient today and updated as necessary. \"  Past Medical History/Comorbidities:   Ms. Kimberlee Jimenez  has a past medical history of Asthma (7/1/2015); Heartburn (7/1/2015); Hyperlipidemia (7/1/2015); Hypertension (7/1/2015); Type 2 diabetes mellitus ([facility] [address] and Urge incontinence (7/1/2015). Ms. Kimberlee Jimenez  has a past surgical history that includes cataract removal (2011).   Social History/Living Environment:   Home Environment: Private residence  One/Two Story Residence: One story  Living Alone: Yes  Support Systems: Friends \ neighbors, Family member(s)  Patient Expects to be Discharged to[de-identified] Rehabilitation facility  Current DME Used/Available at Home: Cane, quad  Prior Level of Function/Work/Activity:  Prior to previous admission, patient was living at home alone and ambulating with quad cane. Daughter checked on her frequently. Patient was active and worked in yard, etc.      Number of Personal Factors/Comorbidities that affect the Plan of Care: 3+: HIGH COMPLEXITY   EXAMINATION:   Most Recent Physical Functioning:   Gross Assessment:  AROM: Generally decreased, functional  Strength: Generally decreased, functional               Posture:  Posture (WDL): Exceptions to WDL  Posture Assessment: Forward head, Rounded shoulders, Kyphosis  Balance:  Sitting: Intact  Standing: Impaired  Standing - Static: Constant support Bed Mobility:  Supine to Sit: Contact guard assistance;Minimum assistance  Wheelchair Mobility:     Transfers:  Sit to Stand: Contact guard assistance  Stand to Sit: Contact guard assistance  Gait:     Base of Support: Widened  Speed/[place]: Slow  Step Length: Right shortened;Left shortened  Gait Abnormalities: Decreased step clearance; Other (flexed posture)  Distance (ft): 14 Feet (ft)  Assistive Device: Walker, rolling  Ambulation - Level of Assistance: Contact guard assistance;Minimal assistance       Body Structures Involved:  1. Heart Body Functions Affected:  1. Cardio  2. Movement Related Activities and Participation Affected:  1. Mobility  2. Self Care  3. Domestic Life   Number of elements that affect the Plan of Care: 4+: HIGH COMPLEXITY   CLINICAL PRESENTATION:   Presentation: Evolving clinical presentation with changing clinical characteristics: MODERATE COMPLEXITY   CLINICAL DECISION MAKIN [facility] Mobility Inpatient Short Form  How much difficulty does the patient currently have. ..  Unable A Lot A Little None   1. Turning over in bed (including adjusting bedclothes, sheets and blankets)? [ ] 1   [ ] 2   [X] 3   [ ] 4   2. Sitting down on and standing up from a chair with arms ( e.g., wheelchair, bedside commode, etc.)   [ ] 1   [ ] 2   [X] 3   [ ] 4   3. Moving from lying on back to sitting on the side of the bed? [ ] 1   [ ] 2   [X] 3   [ ] 4   How much help from another person does the patient currently need. .. Total A Lot A Little None   4. Moving to and from a bed to a chair (including a wheelchair)? [ ] 1   [ ] 2   [X] 3   [ ] 4   5. Need to walk in hospital room? [ ] 1   [ ] 2   [X] 3   [ ] 4   6. Climbing 3-5 steps with a railing? [ ] 1   [ ] 2   [X] 3   [ ] 4   © 2007, Trustees of [address] [place] [address], under license to [employer]. All rights reserved    Score:  Initial: 18 Most Recent: X (Date: -- )     Interpretation of Tool:  Represents activities that are increasingly more difficult (i.e. Bed mobility, Transfers, Gait). Score 24 23 22-20 19-15 14-10 9-7 6       Modifier CH CI CJ CK CL CM CN         · Mobility - Walking and Moving Around:               - CURRENT STATUS:    CK - 40%-59% impaired, limited or restricted               - GOAL STATUS:           CJ - 20%-39% impaired, limited or restricted               - D/C STATUS:                       ---------------To be determined---------------  Payor: [employer] VIP CARE PLUS [place] [employer] [place]US / Product Type: Managed Care Medicare /       Medical Necessity:     · Patient is expected to demonstrate progress in strength, range of motion, balance, coordination and functional technique to increase independence with   and improve safety during all functional mobilit. Reason for Services/Other Comments:  · Patient continues to require skilled intervention due to medical complications.    Use of outcome tool(s) and clinical judgement create a POC that gives a: Questionable prediction of patient's progress: MODERATE COMPLEXITY                 TREATMENT:   (In addition to Assessment/Re-Assessment sessions the following treatments were rendered)   Pre-treatment Symptoms/Complaints:    Pain: Initial:   Pain Intensity 1: 0  Post Session:  0      Assessment/Reassessment only, no treatment provided today     Braces/Orthotics/Lines/Etc:   · O2 Device: Room air  Treatment/Session Assessment:    · Response to Treatment:  See above. · Interdisciplinary Collaboration:  · Physical Therapist  · Registered Nurse  · After treatment position/precautions:  · Up in chair  · Bed/Chair-wheels locked  · Call light within reach  · Family at bedside  · Compliance with Program/Exercises: compliant all of the time. · Recommendations/Intent for next treatment session: \"Next visit will focus on advancements to more challenging activities and reduction in assistance provided\".   Total Treatment Duration:  PT Patient Time In/Time Out  Time In: 0845  Time Out: [address] ([facility]), PT

## 2017-04-30 NOTE — PROGRESS NOTES
Problem: Pressure Injury - Risk of  Goal: *Prevention of pressure ulcer  Outcome: Progressing Towards Goal    04/29/17 2010   Wound Prevention and Protection Methods   Orientation of Wound Prevention Mid;Posterior   Location of Wound Prevention Sacrum/Coccyx   Dressing Present  Yes; Intact, not due to be changed   Dressing Status Intact   Read Only, Retired: Wound Treatment (non-mechanical)   Wound Offloading (Prevention Methods) Bed, pressure reduction mattress;Pillows;Repositioning;Turning         Problem: Falls - Risk of  Goal: *Absence of falls  Outcome: Progressing Towards Goal  Fall precautions in place. Yellow socks on. Instructed to only get OOB with assistance. Voiced understanding. Call light in reach. Goal: *Knowledge of fall prevention  Outcome: Progressing Towards Goal  Pt educated on fall prevention and to use call light for assistance. Pt verbalizes understanding and will use call light for assistance.

## 2017-04-30 NOTE — PROGRESS NOTES
Bedside and Verbal shift change report given to Brad Park RN (oncoming nurse) by Estelle Hill RN (offgoing nurse). Report included the following information SBAR, Kardex, Accordion and Recent Results.

## 2017-04-30 NOTE — PROGRESS NOTES
[facility] CARDIOLOGY PROGRESS NOTE           4/30/2017 7:29 AM    Admit Date: 4/28/2017    Admit Diagnosis: CHF;Volume overload      Subjective:   No complaints this AM, no chest pain or shortness of breath    Interval History: (History of pertinent interval events obtained from nursing staff)  No events overnight, remains in afib with RVR    ROS:  GEN:  No fever or chills  Cardiovascular:  As noted above  Pulmonary:  As noted above  Neuro:  No new focal motor or sensory loss      Objective:     Vitals:    04/30/17 0553 04/30/17 0644 04/30/17 0721 04/30/17 0724   BP: 103/62 110/68     Pulse:  (!) 113     Resp:       Temp:       SpO2:   99% 97%   Weight:       Height:           Physical Exam:  General-chronically ill appearing frail elderly female, mild respiratory distress  Neck- supple, elevated JVD  CV- irreg irreg, tachycardic, distant S1, S2  Lung- decreased BS at bases  Abd- soft, nontender, nondistended  Ext- extensive NEL 3-4+ extending above the knee  Skin- warm and dry, pale    Current Facility-Administered Medications   Medication Dose Route Frequency    metoprolol tartrate (LOPRESSOR) tablet 37.5 mg  37.5 mg Oral Q6H    furosemide (LASIX) injection 60 mg  60 mg IntraVENous Q12H    acetaminophen (TYLENOL) tablet 650 mg  650 mg Oral Q6H PRN    amiodarone (CORDARONE) tablet 200 mg  200 mg Oral Q12H    apixaban (ELIQUIS) tablet 5 mg  5 mg Oral Q12H    atorvastatin (LIPITOR) tablet 40 mg  40 mg Oral QHS    docusate sodium (COLACE) capsule 100 mg  100 mg Oral BID    levalbuterol (XOPENEX) nebulizer soln 0.63 mg/3 mL  0.63 mg Nebulization Q6H PRN    pantoprazole (PROTONIX) tablet 40 mg  40 mg Oral DAILY    sodium chloride (OCEAN) 0.65 % nasal spray 1 Spray  1 Spray Both Nostrils PRN    sodium chloride (NS) flush 5-10 mL  5-10 mL IntraVENous Q8H    sodium chloride (NS) flush 5-10 mL  5-10 mL IntraVENous PRN    morphine injection 2 mg  2 mg IntraVENous Q4H PRN    budesonide (PULMICORT) 500 mcg/2 ml nebulizer suspension  500 mcg Nebulization BID RT    And    albuterol CONCENTRATE 2.5mg/0.5 mL neb soln  2.5 mg Nebulization Q6H RT     Data Review:   Recent Results (from the past 24 hour(s))   TROPONIN I    Collection Time: 04/29/17 12:05 PM   Result Value Ref Range    Troponin-I, Qt. 0.05 0.02 - 0.05 NG/ML   TROPONIN I    Collection Time: 04/29/17  7:35 PM   Result Value Ref Range    Troponin-I, Qt. 0.06 (H) 0.02 - 0.05 NG/ML   TROPONIN I    Collection Time: 04/29/17 11:50 PM   Result Value Ref Range    Troponin-I, Qt. 0.06 (H) 0.02 - 3.91 NG/ML   METABOLIC PANEL, BASIC    Collection Time: 04/30/17  5:25 AM   Result Value Ref Range    Sodium 145 136 - 145 mmol/L    Potassium 4.2 3.5 - 5.1 mmol/L    Chloride 101 98 - 107 mmol/L    CO2 36 (H) 21 - 32 mmol/L    Anion gap 8 7 - 16 mmol/L    Glucose 114 (H) 65 - 100 mg/dL    BUN 21 8 - 23 MG/DL    Creatinine 1.05 (H) 0.6 - 1.0 MG/DL    GFR est AA >60 >60 ml/min/1.73m2    GFR est non-AA 52 (L) >60 ml/min/1.73m2    Calcium 8.2 (L) 8.3 - 10.4 MG/DL   TROPONIN I    Collection Time: 04/30/17  5:25 AM   Result Value Ref Range    Troponin-I, Qt. 0.06 (H) 0.02 - 0.05 NG/ML   CBC WITH AUTOMATED DIFF    Collection Time: 04/30/17  5:25 AM   Result Value Ref Range    WBC 5.3 4.3 - 11.1 K/uL    RBC 3.22 (L) 4.05 - 5.25 M/uL    HGB 10.5 (L) 11.7 - 15.4 g/dL    HCT 33.1 (L) 35.8 - 46.3 %    .8 (H) 79.6 - 97.8 FL    MCH 32.6 26.1 - 32.9 PG    MCHC 31.7 31.4 - 35.0 g/dL    RDW 18.3 (H) 11.9 - 14.6 %    PLATELET 148 979 - 047 K/uL    MPV 9.4 (L) 10.8 - 14.1 FL    DF AUTOMATED      NEUTROPHILS 77 43 - 78 %    LYMPHOCYTES 11 (L) 13 - 44 %    MONOCYTES 10 4.0 - 12.0 %    EOSINOPHILS 2 0.5 - 7.8 %    BASOPHILS 0 0.0 - 2.0 %    IMMATURE GRANULOCYTES 0.4 0.0 - 5.0 %    ABS. NEUTROPHILS 4.1 1.7 - 8.2 K/UL    ABS. LYMPHOCYTES 0.6 0.5 - 4.6 K/UL    ABS. MONOCYTES 0.5 0.1 - 1.3 K/UL    ABS. EOSINOPHILS 0.1 0.0 - 0.8 K/UL    ABS.  BASOPHILS 0.0 0.0 - 0.2 K/UL ABS. IMM. GRANS. 0.0 0.0 - 0.5 K/UL   BNP    Collection Time: 04/30/17  5:25 AM   Result Value Ref Range    BNP 1016 pg/mL       EKG:  (EKG has been independently visualized by me with interpretation below)  Assessment:     Principal Problem:    Volume overload (4/28/2017)    Active Problems:    Hypertension (7/1/2015)      Hyperlipidemia (7/1/2015)      PE (pulmonary thromboembolism) ([facility] [address] (4/4/2017)      Atrial fibrillation ([facility] [address] (4/4/2017)      Do not resuscitate (4/7/2017)      Plan:   1. Volume overload, uncontrolled: continue lasix to 60mg IV Q12H, continue diruesis, creatinine stable, difficult to get accurate I and Os, continue to monitor weights and creatinine, CXR and BNP with evidence of continued volume overload. 2. Atrial fibrillation, uncontrolled: continue eliquis, cont metoprolol 37.5mg Q6H for improved rate control, continue amiodarone, may consider DCCV once volume status improves. 3. PE: cont eliquis, currently stable  4. HTN: blood pressure stable, cont meds  5. CRI: creatinine stable  6. PPX: on eliquis  7. DNR    Marsha Mancia.  Bull SCHAEFFER  Cardiology/Electrophysiology

## 2017-04-30 NOTE — PROGRESS NOTES
Bedside and Verbal shift change report given to Jose Luis Gaffney RN (oncoming nurse) by Kate Gamez RN (offgoing nurse). Report included the following information SBAR, Kardex, MAR and Recent Results.

## 2017-05-01 PROCEDURE — 77010033678 HC OXYGEN DAILY

## 2017-05-01 PROCEDURE — 74011250637 HC RX REV CODE- 250/637: Performed by: NURSE PRACTITIONER

## 2017-05-01 PROCEDURE — 97165 OT EVAL LOW COMPLEX 30 MIN: CPT

## 2017-05-01 PROCEDURE — 74011250637 HC RX REV CODE- 250/637: Performed by: INTERNAL MEDICINE

## 2017-05-01 PROCEDURE — 94640 AIRWAY INHALATION TREATMENT: CPT

## 2017-05-01 PROCEDURE — 74011250636 HC RX REV CODE- 250/636: Performed by: INTERNAL MEDICINE

## 2017-05-01 PROCEDURE — 97535 SELF CARE MNGMENT TRAINING: CPT

## 2017-05-01 PROCEDURE — 65660000000 HC RM CCU STEPDOWN

## 2017-05-01 PROCEDURE — 94760 N-INVAS EAR/PLS OXIMETRY 1: CPT

## 2017-05-01 PROCEDURE — 74011000250 HC RX REV CODE- 250: Performed by: INTERNAL MEDICINE

## 2017-05-01 RX ORDER — FUROSEMIDE 10 MG/ML
40 INJECTION INTRAMUSCULAR; INTRAVENOUS EVERY 12 HOURS
Status: DISCONTINUED | OUTPATIENT
Start: 2017-05-01 | End: 2017-05-02

## 2017-05-01 RX ADMIN — AMIODARONE HYDROCHLORIDE 200 MG: 200 TABLET ORAL at 08:43

## 2017-05-01 RX ADMIN — ALBUTEROL SULFATE 2.5 MG: 2.5 SOLUTION RESPIRATORY (INHALATION) at 14:52

## 2017-05-01 RX ADMIN — BUDESONIDE 500 MCG: 0.5 SUSPENSION RESPIRATORY (INHALATION) at 21:26

## 2017-05-01 RX ADMIN — METOPROLOL TARTRATE 37.5 MG: 25 TABLET ORAL at 18:35

## 2017-05-01 RX ADMIN — DOCUSATE SODIUM 100 MG: 100 CAPSULE, LIQUID FILLED ORAL at 08:43

## 2017-05-01 RX ADMIN — ALBUTEROL SULFATE 2.5 MG: 2.5 SOLUTION RESPIRATORY (INHALATION) at 21:26

## 2017-05-01 RX ADMIN — APIXABAN 5 MG: 5 TABLET, FILM COATED ORAL at 08:43

## 2017-05-01 RX ADMIN — FUROSEMIDE 60 MG: 10 INJECTION, SOLUTION INTRAMUSCULAR; INTRAVENOUS at 05:39

## 2017-05-01 RX ADMIN — PANTOPRAZOLE SODIUM 40 MG: 40 TABLET, DELAYED RELEASE ORAL at 08:43

## 2017-05-01 RX ADMIN — ALBUTEROL SULFATE 2.5 MG: 2.5 SOLUTION RESPIRATORY (INHALATION) at 07:19

## 2017-05-01 RX ADMIN — AMIODARONE HYDROCHLORIDE 200 MG: 200 TABLET ORAL at 22:24

## 2017-05-01 RX ADMIN — BUDESONIDE 500 MCG: 0.5 SUSPENSION RESPIRATORY (INHALATION) at 07:19

## 2017-05-01 RX ADMIN — Medication 5 ML: at 15:27

## 2017-05-01 RX ADMIN — APIXABAN 5 MG: 5 TABLET, FILM COATED ORAL at 22:24

## 2017-05-01 RX ADMIN — METOPROLOL TARTRATE 37.5 MG: 25 TABLET ORAL at 00:15

## 2017-05-01 RX ADMIN — METOPROLOL TARTRATE 37.5 MG: 25 TABLET ORAL at 05:39

## 2017-05-01 RX ADMIN — FUROSEMIDE 40 MG: 10 INJECTION, SOLUTION INTRAMUSCULAR; INTRAVENOUS at 22:24

## 2017-05-01 RX ADMIN — Medication 10 ML: at 22:25

## 2017-05-01 RX ADMIN — Medication 5 ML: at 05:40

## 2017-05-01 RX ADMIN — ALBUTEROL SULFATE 2.5 MG: 2.5 SOLUTION RESPIRATORY (INHALATION) at 01:27

## 2017-05-01 RX ADMIN — DOCUSATE SODIUM 100 MG: 100 CAPSULE, LIQUID FILLED ORAL at 18:35

## 2017-05-01 RX ADMIN — METOPROLOL TARTRATE 37.5 MG: 25 TABLET ORAL at 12:14

## 2017-05-01 RX ADMIN — ATORVASTATIN CALCIUM 40 MG: 40 TABLET, FILM COATED ORAL at 22:24

## 2017-05-01 NOTE — PROGRESS NOTES
Problem: Self Care Deficits Care Plan (Adult)  Goal: *Acute Goals and Plan of Care (Insert Text)  1. Patient will complete lower body bathing and dressing with minimal assistance and adaptive equipment as needed. 2. Patient will complete toileting with minimal assistance. 3. Patient will tolerate 20 minutes of OT treatment with no rest breaks to increase activity tolerance for ADLs. 4. Patient will complete functional transfers with supervision and adaptive equipment as needed. Timeframe: 7 visits       OCCUPATIONAL THERAPY: Initial Assessment, Daily Note and Treatment Day: 1st 5/1/2017  INPATIENT: Hospital Day: 4  Payor: [employer] VIP CARE PLUS / [place] [employer] [place] / Product Type: Managed Care Medicare /      NAME/AGE/GENDER: Krista Bruno is a 80 y.o. female  PRIMARY DIAGNOSIS:  CHF  Volume overload  Volume overload Volume overload Volume overload        ICD-10: Treatment Diagnosis:        · Generalized Muscle Weakness (M62.81)  · Localized edema (R60.1)   Precautions/Allergies:         Review of patient's allergies indicates no known allergies. ASSESSMENT:      Ms. Isac Medley is a 80year old female admitted with SOB, edema. Found to have CHF and volume overload. Patient has been at [facility] for rehab since last admission. Patient reports needing assistance with walking, ADLs. Prior to that she was living alone and fairly independent. Her goals is to get back home. Patient is supine in bed upon arrival, agreeable to OT evaluation. Reports no pain. [place]. Oriented to person, place, and time. BLE appear edematous, but patient states this is normal for her. Able to complete bed mobility with CGA, stand with minimal assistance and take steps to chair with minimal assistance and rolling walker. Patient then completed bedside commode transfer with minimal assistance and toileting with maximal assistance (see below for more information).  Patient left up in chair with call light and RN aware. Patient is currently functioning below her baseline and would benefit from continued OT to increase independence and safety. Will follow. This section established at most recent assessment   PROBLEM LIST (Impairments causing functional limitations):  1. Decreased Strength  2. Decreased ADL/Functional Activities  3. Decreased Transfer Abilities  4. Decreased Ambulation Ability/Technique  5. Decreased Balance  6. Decreased Activity Tolerance  7. Increased Fatigue  8. Edema/Girth    INTERVENTIONS PLANNED: (Benefits and precautions of occupational therapy have been discussed with the patient.)  1. Activities of daily living training  2. Adaptive equipment training  3. Group therapy  4. Theraputic activity  5. Theraputic exercise      TREATMENT PLAN: Frequency/Duration: Follow patient 3x/ week  to address above goals. Rehabilitation Potential For Stated Goals: GOOD      RECOMMENDED REHABILITATION/EQUIPMENT: (at time of discharge pending progress): Continue Skilled Therapy. OCCUPATIONAL PROFILE AND HISTORY:   History of Present Injury/Illness (Reason for Referral):  Per H&P, \"History of persistent atrial fibrillation. Noted when patient presented to the hospital with a PE. Echo at that time with noted moderate left ventricular systolic dysfunction with ejection fraction of 30-35% [4/17]. Also presented with acute renal failure/anemia in patient with improving renal function on discharge with creatinine noted at 1.42     Poor historian and no family around today. Appears dyspnea at rest. States issues would likely PND/orthopnea and coughing. Uncertain about weight gain but significant edema noted. \"  Past Medical History/Comorbidities:   Ms. Gera Miller  has a past medical history of Asthma (7/1/2015); Heartburn (7/1/2015); Hyperlipidemia (7/1/2015); Hypertension (7/1/2015); Type 2 diabetes mellitus ([facility] [address] and Urge incontinence (7/1/2015).   Ms. Gera Miller  has a past surgical history that includes cataract removal (2011). Social History/Living Environment:   Home Environment: Rehabilitation facility  One/Two Story Residence: One story  Living Alone: Yes  Support Systems: Friends \ neighbors, Family member(s)  Patient Expects to be Discharged to[de-identified] Rehabilitation facility  Current DME Used/Available at Home: [place] [employer], [employer]  Prior Level of Function/Work/Activity:  Patient lives alone and is fairly independent. She has been getting rehab since last admission. She wants to get back to home eventually. Number of Personal Factors/Comorbidities that affect the Plan of Care: Brief history (0):  LOW COMPLEXITY   ASSESSMENT OF OCCUPATIONAL PERFORMANCE[de-identified]   Activities of Daily Living:           Basic ADLs (From Assessment) Complex ADLs (From Assessment)   Basic ADL  Feeding: Setup  Oral Facial Hygiene/Grooming: Setup  Bathing: Moderate assistance  Upper Body Dressing: Minimum assistance  Lower Body Dressing: Maximum assistance  Toileting: Maximum assistance Instrumental ADL  Meal Preparation: Total assistance  Homemaking: Total assistance   Grooming/Bathing/Dressing Activities of Daily Living     Cognitive Retraining  Safety/Judgement: Awareness of environment; Fall prevention           Toileting  Toileting Assistance: Maximum assistance  Bladder Hygiene: Supervision/set-up  Bowel Hygiene: Minimum assistance  Clothing Management: Maximum assistance     Functional Transfers  Toilet Transfer : Minimum assistance  Adaptive Equipment: Bedside commode   Lower Body Dressing Assistance  Dressing Assistance: Maximum assistance  Protective Undergarmet: Maximum assistance Bed/Mat Mobility  Supine to Sit: Contact guard assistance  Sit to Stand: Minimum assistance  Bed to Chair: Minimum assistance  Scooting: Contact guard assistance          Most Recent Physical Functioning:   Gross Assessment:  AROM: Generally decreased, functional (BUE)  Strength: Generally decreased, functional (BUE)               Posture:  Posture (WDL): Exceptions to WDL  Posture Assessment: Forward head, Rounded shoulders, Kyphosis  Balance:  Sitting: Intact  Standing: Impaired  Standing - Static: Fair  Standing - Dynamic : Fair Bed Mobility:  Supine to Sit: Contact guard assistance  Scooting: Contact guard assistance  Wheelchair Mobility:     Transfers:  Sit to Stand: Minimum assistance  Stand to Sit: Minimum assistance  Bed to Chair: Minimum assistance                 Patient Vitals for the past 6 hrs:       BP BP Patient Position SpO2 O2 Flow Rate (L/min) Pulse   17 0748 107/71 Sitting 98 % 1.5 l/min (!) 113   17 1205 107/74 Sitting 100 % - 100        Mental Status  Neurologic State: Alert  Orientation Level: Oriented to place, Oriented to person  Cognition: Follows commands  Perception: Appears intact  Perseveration: No perseveration noted  Safety/Judgement: Awareness of environment, Fall prevention                               Physical Skills Involved:  1. Range of Motion  2. Balance  3. Mobility  4. Strength  5. Endurance Cognitive Skills Affected (resulting in the inability to perform in a timely and safe manner):  1. None Psychosocial Skills Affected:  1. Routines and Behaviors  2. Environmental Adaptations   Number of elements that affect the Plan of Care: 5+:  HIGH COMPLEXITY   CLINICAL DECISION MAKIN [facility] Inpatient Short Form  How much help from another person does the patient currently need. .. Total A Lot A Little None   1. Putting on and taking off regular lower body clothing?   [ ] 1   [X] 2   [ ] 3   [ ] 4   2. Bathing (including washing, rinsing, drying)? [ ] 1   [X] 2   [ ] 3   [ ] 4   3. Toileting, which includes using toilet, bedpan or urinal?   [ ] 1   [X] 2   [ ] 3   [ ] 4   4. Putting on and taking off regular upper body clothing?   [ ] 1   [ ] 2   [X] 3   [ ] 4   5. Taking care of personal grooming such as brushing teeth? [ ] 1   [ ] 2   [X] 3   [ ] 4   6. Eating meals? [ ] 1   [ ] 2   [X] 3   [ ] 4   © 2007, Trustees of [address] [place] [address] [address] [address], under license to [employer]. All rights reserved    Score:  Initial: 15 Most Recent: X (Date: -- )     Interpretation of Tool:  Represents activities that are increasingly more difficult (i.e. Bed mobility, Transfers, Gait). Score 24 23 22-20 19-15 14-10 9-7 6       Modifier CH CI CJ CK CL CM CN         · Self Care:               - CURRENT STATUS:    CK - 40%-59% impaired, limited or restricted               - GOAL STATUS:           CJ - 20%-39% impaired, limited or restricted               - D/C STATUS:                       ---------------To be determined---------------  Payor: [employer] VIP CARE PLUS / [place] [employer] PLUS / Product Type: Managed Care Medicare /       Medical Necessity:     · Patient demonstrates good rehab potential due to higher previous functional level. Reason for Services/Other Comments:  · Patient continues to require present interventions due to patient's inability to care for self at baseline level of function. Use of outcome tool(s) and clinical judgement create a POC that gives a: MODERATE COMPLEXITY             TREATMENT:   (In addition to Assessment/Re-Assessment sessions the following treatments were rendered)      Pre-treatment Symptoms/Complaints:  none  Pain: Initial:   Pain Intensity 1: 0  Post Session:  none      Self Care: (8 minutes): Procedure(s) (per grid) utilized to improve and/or restore self-care/home management as related to toileting and bedside commode transfer. Required minimal verbal cueing to facilitate activities of daily living skills.      Braces/Orthotics/Lines/Etc:   · IV  · O2 Device: Nasal cannula  Treatment/Session Assessment:    · Response to Treatment:  Tolerated well  · Interdisciplinary Collaboration:  · Occupational Therapist  · Registered Nurse  · After treatment position/precautions:  · Up in chair  · Bed/Chair-wheels locked  · Bed in low position  · Call light within reach  · RN notified  · Compliance with Program/Exercises: compliant all of the time. · Recommendations/Intent for next treatment session: \"Next visit will focus on advancements to more challenging activities and reduction in assistance provided\".   Total Treatment Duration:  OT Patient Time In/Time Out  Time In: 1040  Time Out: [id number] [facility] AC Gregory/L

## 2017-05-01 NOTE — PROGRESS NOTES
Bedside and Verbal shift change report given to Fred Reyna RN (oncoming nurse) by Ezra Cooney RN (offgoing nurse). Report included the following information SBAR, Kardex, Accordion and Recent Results.

## 2017-05-01 NOTE — PROGRESS NOTES
SW spoke with pt's Guzman chand cell: [phone]. She expressed concerns about the care pt received at [facility]. Karuna interested in identifying other SNF options that would take pt for rehab and convert her to a long term placement but wants to discuss this with her mother first. SW  tried to contact pt's Evans Murry, to discuss. No answer. Requested of karuna that she  get back with me after she talks with her mother.

## 2017-05-01 NOTE — PROGRESS NOTES
Problem: Pressure Injury - Risk of  Goal: *Prevention of pressure ulcer  Outcome: Progressing Towards Goal    04/30/17 2010   Wound Prevention and Protection Methods   Orientation of Wound Prevention Mid;Posterior   Location of Wound Prevention Sacrum/Coccyx   Dressing Present  Yes; Intact, not due to be changed   Dressing Status Intact   Read Only, Retired: Wound Treatment (non-mechanical)   Wound Offloading (Prevention Methods) Bed, pressure reduction mattress;Pillows;Repositioning; Foam silicone; Turning         Problem: Falls - Risk of  Goal: *Absence of falls  Outcome: Progressing Towards Goal  Fall precautions in place. Yellow socks on. Instructed to only get OOB with assistance. Voiced understanding. Call light in reach. Goal: *Knowledge of fall prevention  Outcome: Progressing Towards Goal  Pt educated on fall prevention and to use call light for assistance. Pt verbalizes understanding and will use call light for assistance.

## 2017-05-01 NOTE — PROGRESS NOTES
[facility] CARDIOLOGY PROGRESS NOTE           5/1/2017 11:33 AM    Admit Date: 4/28/2017      Subjective:   Patient stated breathing is better. ROS:  Cardiovascular:  As noted above    Objective:      Vitals:    05/01/17 0128 05/01/17 0534 05/01/17 0722 05/01/17 0748   BP:  112/73  107/71   Pulse:  (!) 111  (!) 113   Resp:  18  18   Temp:  97 °F (36.1 °C)  97.6 °F (36.4 °C)   SpO2: 100% 100% 100% 98%   Weight:  59.2 kg (130 lb 9.6 oz)     Height:         Current Facility-Administered Medications   Medication Dose Route Frequency    metoprolol tartrate (LOPRESSOR) tablet 37.5 mg  37.5 mg Oral Q6H    furosemide (LASIX) injection 60 mg  60 mg IntraVENous Q12H    acetaminophen (TYLENOL) tablet 650 mg  650 mg Oral Q6H PRN    amiodarone (CORDARONE) tablet 200 mg  200 mg Oral Q12H    apixaban (ELIQUIS) tablet 5 mg  5 mg Oral Q12H    atorvastatin (LIPITOR) tablet 40 mg  40 mg Oral QHS    docusate sodium (COLACE) capsule 100 mg  100 mg Oral BID    levalbuterol (XOPENEX) nebulizer soln 0.63 mg/3 mL  0.63 mg Nebulization Q6H PRN    pantoprazole (PROTONIX) tablet 40 mg  40 mg Oral DAILY    sodium chloride (OCEAN) 0.65 % nasal spray 1 Spray  1 Spray Both Nostrils PRN    sodium chloride (NS) flush 5-10 mL  5-10 mL IntraVENous Q8H    sodium chloride (NS) flush 5-10 mL  5-10 mL IntraVENous PRN    morphine injection 2 mg  2 mg IntraVENous Q4H PRN    budesonide (PULMICORT) 500 mcg/2 ml nebulizer suspension  500 mcg Nebulization BID RT    And    albuterol CONCENTRATE 2.5mg/0.5 mL neb soln  2.5 mg Nebulization Q6H RT         Physical Exam:  General-No Acute Distress  Neck- supple, no JVD  CV- irreg irregular rate and rhythm   Lung- diminished at based with exp crackles. Abd- soft, nontender, nondistended  Ext- no edema bilaterally.  Grade 2 STAGE 2   Skin- warm and dry    Data Review:   Recent Labs      04/30/17   1143  04/30/17   0525   04/29/17   0055   NA   --   145   --   144   K   -- 4.2   --   4.7   BUN   --   21   --   25*   CREA   --   1.05*   --   1.08*   GLU   --   114*   --   143*   WBC   --   5.3   --   6.6   HGB   --   10.5*   --   10.9*   HCT   --   33.1*   --   34.3*   PLT   --   208   --   195   TROIQ  0.06*  0.06*   < >  0.05    < > = values in this interval not displayed. Assessment/Plan:     Principal Problem:    Volume overload (4/28/2017) patient on IV lasix 60 BID. I&O cannot be recorded due to incontinence. Decreased lasix to 40 mg IV q12. Family not in room, we discussed goals of care which needs to be readdressed with family.      Active Problems:    Hypertension (7/1/2015)      Hyperlipidemia (7/1/2015)      PE (pulmonary thromboembolism) ([facility] [address] (4/4/2017)      Atrial fibrillation ([facility] [address] (4/4/2017) rate controlled on AC      Do not resuscitate (4/7/2017)          Jake Pollock MD  5/1/2017 11:33 AM

## 2017-05-01 NOTE — CDMP QUERY
Please clarify if this patient is being treated/managed for:    ACUTE SYSTOLIC CHF in the setting of advanced age and history of HTN with edema, orthopnea, BNP 1016, CXR with cardiomegaly, pulmonary vascular congestion, and pleural effusions, echo with LVEF 30-35% requiring treatment with IV lasix. =>Other Explanation of clinical findings  =>Unable to Determine (no explanation of clinical findings)    The medical record reflects the following:    Risk Factors: Advanced Age, history of HTN, recent PE    Clinical Indicators: Edema, Orthopnea, BNP 1016, CXR with cardiomegaly, pulmonary vascular congestion, and pleural effusions, echo 4/6/17 with LVEF 30-35%    Treatment: IV Lasix    Please clarify and document your clinical opinion in the progress notes and discharge summary including the definitive and/or presumptive diagnosis, (suspected or probable), related to the above clinical findings. Please include clinical findings supporting your diagnosis.     Thanks,  Soren Westbrook RN, [address] [place] [address] Documentation Management Program  [phone]

## 2017-05-02 PROCEDURE — 97530 THERAPEUTIC ACTIVITIES: CPT

## 2017-05-02 PROCEDURE — 74011250637 HC RX REV CODE- 250/637: Performed by: NURSE PRACTITIONER

## 2017-05-02 PROCEDURE — 74011000250 HC RX REV CODE- 250: Performed by: INTERNAL MEDICINE

## 2017-05-02 PROCEDURE — 94760 N-INVAS EAR/PLS OXIMETRY 1: CPT

## 2017-05-02 PROCEDURE — 77030019605

## 2017-05-02 PROCEDURE — 86580 TB INTRADERMAL TEST: CPT | Performed by: INTERNAL MEDICINE

## 2017-05-02 PROCEDURE — 94640 AIRWAY INHALATION TREATMENT: CPT

## 2017-05-02 PROCEDURE — 77010033678 HC OXYGEN DAILY

## 2017-05-02 PROCEDURE — 65660000000 HC RM CCU STEPDOWN

## 2017-05-02 PROCEDURE — 74011000302 HC RX REV CODE- 302: Performed by: INTERNAL MEDICINE

## 2017-05-02 PROCEDURE — 74011250637 HC RX REV CODE- 250/637: Performed by: INTERNAL MEDICINE

## 2017-05-02 RX ORDER — FUROSEMIDE 40 MG/1
40 TABLET ORAL DAILY
Status: DISCONTINUED | OUTPATIENT
Start: 2017-05-02 | End: 2017-05-04 | Stop reason: HOSPADM

## 2017-05-02 RX ADMIN — APIXABAN 5 MG: 5 TABLET, FILM COATED ORAL at 09:45

## 2017-05-02 RX ADMIN — PANTOPRAZOLE SODIUM 40 MG: 40 TABLET, DELAYED RELEASE ORAL at 09:45

## 2017-05-02 RX ADMIN — ALBUTEROL SULFATE 2.5 MG: 2.5 SOLUTION RESPIRATORY (INHALATION) at 13:15

## 2017-05-02 RX ADMIN — DOCUSATE SODIUM 100 MG: 100 CAPSULE, LIQUID FILLED ORAL at 17:34

## 2017-05-02 RX ADMIN — ALBUTEROL SULFATE 2.5 MG: 2.5 SOLUTION RESPIRATORY (INHALATION) at 07:00

## 2017-05-02 RX ADMIN — Medication 5 ML: at 17:35

## 2017-05-02 RX ADMIN — AMIODARONE HYDROCHLORIDE 200 MG: 200 TABLET ORAL at 21:08

## 2017-05-02 RX ADMIN — BUDESONIDE 500 MCG: 0.5 SUSPENSION RESPIRATORY (INHALATION) at 07:00

## 2017-05-02 RX ADMIN — METOPROLOL TARTRATE 37.5 MG: 25 TABLET ORAL at 06:10

## 2017-05-02 RX ADMIN — ALBUTEROL SULFATE 2.5 MG: 2.5 SOLUTION RESPIRATORY (INHALATION) at 00:06

## 2017-05-02 RX ADMIN — ALBUTEROL SULFATE 2.5 MG: 2.5 SOLUTION RESPIRATORY (INHALATION) at 19:44

## 2017-05-02 RX ADMIN — ATORVASTATIN CALCIUM 40 MG: 40 TABLET, FILM COATED ORAL at 21:08

## 2017-05-02 RX ADMIN — DOCUSATE SODIUM 100 MG: 100 CAPSULE, LIQUID FILLED ORAL at 09:45

## 2017-05-02 RX ADMIN — METOPROLOL TARTRATE 37.5 MG: 25 TABLET ORAL at 01:31

## 2017-05-02 RX ADMIN — AMIODARONE HYDROCHLORIDE 200 MG: 200 TABLET ORAL at 09:45

## 2017-05-02 RX ADMIN — BUDESONIDE 500 MCG: 0.5 SUSPENSION RESPIRATORY (INHALATION) at 19:44

## 2017-05-02 RX ADMIN — Medication 5 ML: at 06:12

## 2017-05-02 RX ADMIN — TUBERCULIN PURIFIED PROTEIN DERIVATIVE 5 UNITS: 5 INJECTION, SOLUTION INTRADERMAL at 12:47

## 2017-05-02 RX ADMIN — APIXABAN 5 MG: 5 TABLET, FILM COATED ORAL at 21:08

## 2017-05-02 RX ADMIN — FUROSEMIDE 40 MG: 40 TABLET ORAL at 09:45

## 2017-05-02 RX ADMIN — Medication 5 ML: at 21:08

## 2017-05-02 NOTE — PROGRESS NOTES
Bedside and Verbal shift change report given to self (oncoming nurse) by Robert Shafer RN (offgoing nurse). Report included the following information SBAR, Kardex, Procedure Summary, Intake/Output, MAR, Recent Results and Cardiac Rhythm a-fib 110s.

## 2017-05-02 NOTE — PROGRESS NOTES
[facility] CARDIOLOGY PROGRESS NOTE           5/2/2017 11:33 AM    Admit Date: 4/28/2017      Subjective:   Patient stated breathing is better. ROS:  Cardiovascular:  As noted above    Objective:      Vitals:    05/02/17 0131 05/02/17 0541 05/02/17 0700 05/02/17 0742   BP: 114/75 99/68  92/55   Pulse: (!) 113 (!) 112  (!) 114   Resp:  17 17   Temp:  97 °F (36.1 °C)  96.6 °F (35.9 °C)   SpO2:  100% 95% 95%   Weight:  60.6 kg (133 lb 11.2 oz)     Height:         Current Facility-Administered Medications   Medication Dose Route Frequency    furosemide (LASIX) tablet 40 mg  40 mg Oral DAILY    metoprolol tartrate (LOPRESSOR) tablet 37.5 mg  37.5 mg Oral Q6H    acetaminophen (TYLENOL) tablet 650 mg  650 mg Oral Q6H PRN    amiodarone (CORDARONE) tablet 200 mg  200 mg Oral Q12H    apixaban (ELIQUIS) tablet 5 mg  5 mg Oral Q12H    atorvastatin (LIPITOR) tablet 40 mg  40 mg Oral QHS    docusate sodium (COLACE) capsule 100 mg  100 mg Oral BID    levalbuterol (XOPENEX) nebulizer soln 0.63 mg/3 mL  0.63 mg Nebulization Q6H PRN    pantoprazole (PROTONIX) tablet 40 mg  40 mg Oral DAILY    sodium chloride (OCEAN) 0.65 % nasal spray 1 Spray  1 Spray Both Nostrils PRN    sodium chloride (NS) flush 5-10 mL  5-10 mL IntraVENous Q8H    sodium chloride (NS) flush 5-10 mL  5-10 mL IntraVENous PRN    morphine injection 2 mg  2 mg IntraVENous Q4H PRN    budesonide (PULMICORT) 500 mcg/2 ml nebulizer suspension  500 mcg Nebulization BID RT    And    albuterol CONCENTRATE 2.5mg/0.5 mL neb soln  2.5 mg Nebulization Q6H RT         Physical Exam:  General-No Acute Distress  Neck- supple, no JVD  CV- irreg irregular rate and rhythm   Lung- diminished at based with exp crackles. Abd- soft, nontender, nondistended  Ext- no edema bilaterally.  Grade 2 STAGE 1  Skin- warm and dry    Data Review:   Recent Labs      04/30/17   1143  04/30/17   0525   NA   --   145   K   --   4.2   BUN   --   21   CREA   -- 1.05*   GLU   --   114*   WBC   --   5.3   HGB   --   10.5*   HCT   --   33.1*   PLT   --   208   TROIQ  0.06*  0.06*       Assessment/Plan:     Principal Problem:    Volume overload (4/28/2017) patient on IV lasix 60 BID previosult. I&O cannot be recorded due to incontinence. Decreased lasix to 40 mg po.patient was not discharged on dirutics Family not in room, we discussed goals of care plan to get in tough with daughter later today.      Active Problems:    Hypertension (7/1/2015)      Hyperlipidemia (7/1/2015)      PE (pulmonary thromboembolism) ([facility] [address] (4/4/2017)      Atrial fibrillation ([facility] [address] (4/4/2017) rate controlled on AC      Do not resuscitate (4/7/2017)          Trenton Antonio MD  5/2/2017 11:33 AM

## 2017-05-02 NOTE — PROGRESS NOTES
Problem: Falls - Risk of  Goal: *Absence of falls  Outcome: Progressing Towards Goal  Patient progressing towards goal with no falls on current admission. Patient without confusion or agitation. Patient has unsteady gait on ambulation, requiring 1 person assist and times. Personal belongings are within reach. Bed is in the low and locked position with side rails up x3 and bed alarm turned on. Yellow gripper socks to bilateral feet. Call light within reach and patient verbalizes understanding of use. Daughter is at bedside and also verbalizes understanding.

## 2017-05-02 NOTE — PROGRESS NOTES
Problem: Mobility Impaired (Adult and Pediatric)  Goal: *Acute Goals and Plan of Care (Insert Text)  LTG:  (1.)Ms. Catherine Jensen will move from supine to sit and sit to supine and roll side to side in bed with siderails with MODIFIED INDEPENDENCE within 7 day(s). (2.)Ms. Catherine Jensen will perform all functional transfers with SUPERVISION using the least restrictive/no device within 7 day(s). (3.)Ms. Catherine Jensen will ambulate with STAND BY ASSIST for 150 feet with normal vital sign response with the least restrictive device within 7 day(s). PHYSICAL THERAPY: Daily Note, Treatment Day: 1st and AM 5/2/2017  INPATIENT: Hospital Day: 5  Payor: FIRST CHO[employer] VIP CARE PLUS / [place] [employer] VIP CARE [place]US / Product Type: [employer] Care Medicare /      NAME/AGE/GENDER: Lopez Mccormack is a 80 y.o. female  PRIMARY DIAGNOSIS: CHF  Volume overload  Volume overload Volume overload Volume overload        ICD-10: Treatment Diagnosis:       · Other abnormalities of gait and mobility (R26.89)   Precaution/Allergies:  Review of patient's allergies indicates no known allergies. ASSESSMENT:      Ms. Catherine Jensen presents supine on arrival, sleeping but awakens easily. Pt required CGA to sit to EOB on L side. Pt with good static sitting balance. Pt requested to use BSC. Sit to stand with CGA/MIN A, ambulated few steps from bed to [facility] with shuffled gait and MIN A. Pt required total assist for brief change, and MIN A for young care needs. Pt then sit to stand from [facility] with MIN A, slow shuffled steps 4' from [facility] to chair in room. Pt states she is tired today and declined to ambulate into hallway. Prior to admission pt was living alone and using a quad cane for ambulation. Pt is continuing to function well below baseline level prior to admission; pt may benefit from rehab at discharge. This section established at most recent assessment   PROBLEM LIST (Impairments causing functional limitations):  1.  Decreased ADL/Functional Activities  2. Decreased Transfer Abilities  3. Decreased Ambulation Ability/Technique  4. Decreased Balance  5. Decreased Activity Tolerance  6. Increased Fatigue    INTERVENTIONS PLANNED: (Benefits and precautions of physical therapy have been discussed with the patient.)  1. Balance Exercise  2. Bed Mobility  3. Gait Training  4. Therapeutic Activites  5. Therapeutic Exercise/Strengthening  6. Transfer Training  7. education  8. Group Therapy      TREATMENT PLAN: Frequency/Duration: 3 times a week for duration of hospital stay  Rehabilitation Potential For Stated Goals: EXCELLENT      RECOMMENDED REHABILITATION/EQUIPMENT: (at time of discharge pending progress): Continue Skilled Therapy and Rehab. HISTORY:   History of Present Injury/Illness (Reason for Referral):  Per MD note, \"History of persistent atrial fibrillation. Noted when patient presented to the hospital with a PE. Echo at that time with noted moderate left ventricular systolic dysfunction with ejection fraction of 30-35% [4/17]. Also presented with acute renal failure/anemia in patient with improving renal function on discharge with creatinine noted at 1.42     Poor historian and no family around today. Appears dyspnea at rest. States issues would likely PND/orthopnea and coughing. Uncertain about weight gain but significant edema noted. Past Medical History, Past Surgical History, Family history, Social History, and Medications were all reviewed with the patient today and updated as necessary. \"  Past Medical History/Comorbidities:   Ms. Raúl Jacob  has a past medical history of Asthma (7/1/2015); Heartburn (7/1/2015); Hyperlipidemia (7/1/2015); Hypertension (7/1/2015); Type 2 diabetes mellitus ([facility] [address] and Urge incontinence (7/1/2015). Ms. Raúl Jacob  has a past surgical history that includes cataract removal (2011).   Social History/Living Environment:   Home Environment: Rehabilitation facility  One/Two Story Residence: One story  Living Alone: Yes  Support Systems: Friends \ neighbors, Family member(s)  Patient Expects to be Discharged to[de-identified] Rehabilitation facility  Current DME Used/Available at Home: Cane, quad  Prior Level of Function/Work/Activity:  Prior to previous admission, patient was living at home alone and ambulating with quad cane. Daughter checked on her frequently. Patient was active and worked in yard, etc.      Number of Personal Factors/Comorbidities that affect the Plan of Care: 3+: HIGH COMPLEXITY   EXAMINATION:   Most Recent Physical Functioning:   Gross Assessment:                  Posture:  Posture Assessment: Forward head, Kyphosis, Rounded shoulders  Balance:  Sitting: Intact  Standing: Impaired  Standing - Static: Fair  Standing - Dynamic : Fair Bed Mobility:  Rolling: Contact guard assistance  Supine to Sit: Contact guard assistance  Sit to Supine:  (left up in chair)  Scooting: Contact guard assistance  Wheelchair Mobility:     Transfers:  Sit to Stand: Minimum assistance  Stand to Sit: Minimum assistance  Bed to Chair: Minimum assistance  Gait:     Base of Support: Widened  Speed/[place]: Shuffled; Slow  Step Length: Left shortened;Right shortened  Gait Abnormalities: Decreased step clearance;Shuffling gait (forward flexed posture)  Distance (ft): 6 Feet (ft) (bed to [facility], BSC to chair in room)  Assistive Device:  (HHA to [facility] and chair, close contact)  Ambulation - Level of Assistance: Minimal assistance       Body Structures Involved:  1. Heart Body Functions Affected:  1. Cardio  2. Movement Related Activities and Participation Affected:  1. Mobility  2. Self Care  3.  Domestic Life   Number of elements that affect the Plan of Care: 4+: HIGH COMPLEXITY   CLINICAL PRESENTATION:   Presentation: Evolving clinical presentation with changing clinical characteristics: MODERATE COMPLEXITY   CLINICAL DECISION MAKIN [facility] Inpatient Short Form  How much difficulty does the patient currently have. .. Unable A Lot A Little None   1. Turning over in bed (including adjusting bedclothes, sheets and blankets)? [ ] 1   [ ] 2   [X] 3   [ ] 4   2. Sitting down on and standing up from a chair with arms ( e.g., wheelchair, bedside commode, etc.)   [ ] 1   [ ] 2   [X] 3   [ ] 4   3. Moving from lying on back to sitting on the side of the bed? [ ] 1   [ ] 2   [X] 3   [ ] 4   How much help from another person does the patient currently need. .. Total A Lot A Little None   4. Moving to and from a bed to a chair (including a wheelchair)? [ ] 1   [ ] 2   [X] 3   [ ] 4   5. Need to walk in hospital room? [ ] 1   [ ] 2   [X] 3   [ ] 4   6. Climbing 3-5 steps with a railing? [ ] 1   [ ] 2   [X] 3   [ ] 4   © 2007, Trustees of [address] [place] [address], under license to [employer]. All rights reserved    Score:  Initial: 18 Most Recent: X (Date: -- )     Interpretation of Tool:  Represents activities that are increasingly more difficult (i.e. Bed mobility, Transfers, Gait). Score 24 23 22-20 19-15 14-10 9-7 6       Modifier CH CI CJ CK CL CM CN         · Mobility - Walking and Moving Around:               - CURRENT STATUS:    CK - 40%-59% impaired, limited or restricted               - GOAL STATUS:           CJ - 20%-39% impaired, limited or restricted               - D/C STATUS:                       ---------------To be determined---------------  Payor: [employer] VIP CARE PLUS / [place] [employer] VIP CARE PLUS / Product Type: Managed Care Medicare /       Medical Necessity:     · Patient is expected to demonstrate progress in strength, range of motion, balance, coordination and functional technique to increase independence with   and improve safety during all functional mobilit. Reason for Services/Other Comments:  · Patient continues to require skilled intervention due to medical complications.    Use of outcome tool(s) and clinical judgement create a POC that gives a: Questionable prediction of patient's progress: MODERATE COMPLEXITY                 TREATMENT:   (In addition to Assessment/Re-Assessment sessions the following treatments were rendered)   Pre-treatment Symptoms/Complaints:  \"I am going home with my family I think\"  Pain: Initial:   Pain Intensity 1: 0  Post Session:  0      Therapeutic Activity: (    15 min): Therapeutic activities including Bed transfers, Chair transfers, Toilet transfers and Ambulation on level ground to improve mobility, strength, balance and coordination. Required minimal   to promote static and dynamic balance in standing and promote coordination of bilateral, lower extremity(s). Braces/Orthotics/Lines/Etc:   · O2 Device: Room air  Treatment/Session Assessment:    · Response to Treatment:  See above. · Interdisciplinary Collaboration:  · Physical Therapist  · Registered Nurse  · After treatment position/precautions:  · Up in chair, Bed/Chair-wheels locked, Call light within reach and RN notified  · Compliance with Program/Exercises: compliant all of the time. · Recommendations/Intent for next treatment session: \"Next visit will focus on advancements to more challenging activities and reduction in assistance provided\".   Total Treatment Duration:  PT Patient Time In/Time Out  Time In: 1124  Time Out: 330 Praveen Jose, PT

## 2017-05-02 NOTE — PROGRESS NOTES
Bedside and Verbal shift change report given to Philippe Hurt RN (oncoming nurse) by self Ez law). Report included the following information SBAR, Kardex, Intake/Output, MAR, Recent Results and Cardiac Rhythm a-fib.

## 2017-05-02 NOTE — PROGRESS NOTES
Bedside and Verbal shift change report given to TOYA Johnson RN (oncoming nurse) by ANNE Adam RN (offgoing nurse). Report included the following information SBAR, Kardex, Procedure Summary, Intake/Output, MAR and Recent Results.

## 2017-05-02 NOTE — PROGRESS NOTES
Bedside and Verbal shift change report given to self (oncoming nurse) by Sherie Tripp RN (offgoing nurse). Report included the following information SBAR, Kardex, ED Summary, Procedure Summary, MAR, Recent Results and Cardiac Rhythm a-fib.

## 2017-05-02 NOTE — PROGRESS NOTES
IDALIA spoke with pt's adalu, Mark Iraheta , today. She has discussed the dc options with her mother (pt's je) and they have decided that it would best for pt to return to rehab before coming home. They do not want her to return to [facility]. Prefer [facility]. Referral made. Insurance precert will be required before dc. Addendum: Rehab bed offered by [facility]. Precert initiated.

## 2017-05-03 LAB
ANION GAP BLD CALC-SCNC: 6 MMOL/L (ref 7–16)
BUN SERPL-MCNC: 23 MG/DL (ref 8–23)
CALCIUM SERPL-MCNC: 8.6 MG/DL (ref 8.3–10.4)
CHLORIDE SERPL-SCNC: 91 MMOL/L (ref 98–107)
CO2 SERPL-SCNC: 43 MMOL/L (ref 21–32)
CREAT SERPL-MCNC: 1.53 MG/DL (ref 0.6–1)
ERYTHROCYTE [DISTWIDTH] IN BLOOD BY AUTOMATED COUNT: 17.8 % (ref 11.9–14.6)
GLUCOSE SERPL-MCNC: 157 MG/DL (ref 65–100)
HCT VFR BLD AUTO: 37.4 % (ref 35.8–46.3)
HGB BLD-MCNC: 11.9 G/DL (ref 11.7–15.4)
MCH RBC QN AUTO: 33.3 PG (ref 26.1–32.9)
MCHC RBC AUTO-ENTMCNC: 31.8 G/DL (ref 31.4–35)
MCV RBC AUTO: 104.8 FL (ref 79.6–97.8)
MM INDURATION POC: 0 MM (ref 0–5)
PLATELET # BLD AUTO: 202 K/UL (ref 150–450)
PMV BLD AUTO: 9.4 FL (ref 10.8–14.1)
POTASSIUM SERPL-SCNC: 4.2 MMOL/L (ref 3.5–5.1)
PPD POC: NEGATIVE NEGATIVE
RBC # BLD AUTO: 3.57 M/UL (ref 4.05–5.25)
SODIUM SERPL-SCNC: 140 MMOL/L (ref 136–145)
WBC # BLD AUTO: 6.8 K/UL (ref 4.3–11.1)

## 2017-05-03 PROCEDURE — 74011000250 HC RX REV CODE- 250: Performed by: INTERNAL MEDICINE

## 2017-05-03 PROCEDURE — 36415 COLL VENOUS BLD VENIPUNCTURE: CPT | Performed by: INTERNAL MEDICINE

## 2017-05-03 PROCEDURE — 80048 BASIC METABOLIC PNL TOTAL CA: CPT | Performed by: INTERNAL MEDICINE

## 2017-05-03 PROCEDURE — 85027 COMPLETE CBC AUTOMATED: CPT | Performed by: INTERNAL MEDICINE

## 2017-05-03 PROCEDURE — 74011250637 HC RX REV CODE- 250/637: Performed by: INTERNAL MEDICINE

## 2017-05-03 PROCEDURE — 97530 THERAPEUTIC ACTIVITIES: CPT

## 2017-05-03 PROCEDURE — 94760 N-INVAS EAR/PLS OXIMETRY 1: CPT

## 2017-05-03 PROCEDURE — 76450000000

## 2017-05-03 PROCEDURE — 74011250637 HC RX REV CODE- 250/637: Performed by: NURSE PRACTITIONER

## 2017-05-03 PROCEDURE — 65660000000 HC RM CCU STEPDOWN

## 2017-05-03 PROCEDURE — 97110 THERAPEUTIC EXERCISES: CPT

## 2017-05-03 PROCEDURE — 77010033678 HC OXYGEN DAILY

## 2017-05-03 PROCEDURE — 94640 AIRWAY INHALATION TREATMENT: CPT

## 2017-05-03 RX ADMIN — Medication 5 ML: at 21:13

## 2017-05-03 RX ADMIN — ALBUTEROL SULFATE 2.5 MG: 2.5 SOLUTION RESPIRATORY (INHALATION) at 01:25

## 2017-05-03 RX ADMIN — DOCUSATE SODIUM 100 MG: 100 CAPSULE, LIQUID FILLED ORAL at 09:39

## 2017-05-03 RX ADMIN — METOPROLOL TARTRATE 37.5 MG: 25 TABLET ORAL at 12:27

## 2017-05-03 RX ADMIN — AMIODARONE HYDROCHLORIDE 200 MG: 200 TABLET ORAL at 09:39

## 2017-05-03 RX ADMIN — DOCUSATE SODIUM 100 MG: 100 CAPSULE, LIQUID FILLED ORAL at 17:00

## 2017-05-03 RX ADMIN — AMIODARONE HYDROCHLORIDE 200 MG: 200 TABLET ORAL at 21:12

## 2017-05-03 RX ADMIN — Medication 10 ML: at 12:35

## 2017-05-03 RX ADMIN — ALBUTEROL SULFATE 2.5 MG: 2.5 SOLUTION RESPIRATORY (INHALATION) at 14:02

## 2017-05-03 RX ADMIN — METOPROLOL TARTRATE 37.5 MG: 25 TABLET ORAL at 00:51

## 2017-05-03 RX ADMIN — FUROSEMIDE 40 MG: 40 TABLET ORAL at 09:39

## 2017-05-03 RX ADMIN — ALBUTEROL SULFATE 2.5 MG: 2.5 SOLUTION RESPIRATORY (INHALATION) at 09:00

## 2017-05-03 RX ADMIN — BUDESONIDE 500 MCG: 0.5 SUSPENSION RESPIRATORY (INHALATION) at 19:27

## 2017-05-03 RX ADMIN — PANTOPRAZOLE SODIUM 40 MG: 40 TABLET, DELAYED RELEASE ORAL at 09:39

## 2017-05-03 RX ADMIN — ALBUTEROL SULFATE 2.5 MG: 2.5 SOLUTION RESPIRATORY (INHALATION) at 19:27

## 2017-05-03 RX ADMIN — METOPROLOL TARTRATE 37.5 MG: 25 TABLET ORAL at 06:40

## 2017-05-03 RX ADMIN — APIXABAN 2.5 MG: 2.5 TABLET, FILM COATED ORAL at 21:12

## 2017-05-03 RX ADMIN — ATORVASTATIN CALCIUM 40 MG: 40 TABLET, FILM COATED ORAL at 21:12

## 2017-05-03 RX ADMIN — APIXABAN 2.5 MG: 2.5 TABLET, FILM COATED ORAL at 09:39

## 2017-05-03 RX ADMIN — BUDESONIDE 500 MCG: 0.5 SUSPENSION RESPIRATORY (INHALATION) at 09:00

## 2017-05-03 NOTE — PROGRESS NOTES
SW spoke with pt's daughter, Katlin Cates and granddaughter, Zoraida Dewitt, in room today. They confirmed they want pt to dc to rehab at [facility] . A bed has been offered and insurance precert has been initiated.

## 2017-05-03 NOTE — PROGRESS NOTES
Problem: Self Care Deficits Care Plan (Adult)  Goal: *Acute Goals and Plan of Care (Insert Text)  1. Patient will complete lower body bathing and dressing with minimal assistance and adaptive equipment as needed. 2. Patient will complete toileting with minimal assistance. 3. Patient will tolerate 20 minutes of OT treatment with no rest breaks to increase activity tolerance for ADLs. 4. Patient will complete functional transfers with supervision and adaptive equipment as needed. Timeframe: 7 visits       OCCUPATIONAL THERAPY: Daily Note, Treatment Day: 2nd and AM    5/3/2017  INPATIENT: Hospital Day: 6  Payor: FIRST CHO[employer] VIP CARE PLUS / [place] [employer] VIP CARE [place]US / Product Type: Managed Care Medicare /      NAME/AGE/GENDER: Taylor Littlejohn is a 80 y.o. female  PRIMARY DIAGNOSIS:  CHF  Volume overload  Volume overload Volume overload Volume overload        ICD-10: Treatment Diagnosis:        · Generalized Muscle Weakness (M62.81)  · Localized edema (R60.1)   Precautions/Allergies:         Review of patient's allergies indicates no known allergies. ASSESSMENT:      Ms. Albert De Souza is a 80year old female admitted with SOB, edema. Found to have CHF and volume overload. Patient has been at [facility] for rehab since last admission. Patient reports needing assistance with walking, ADLs. Prior to that she was living alone and fairly independent. Her goals is to get back home. 5/3/2017 Pt was presented up in the chair upon arrival with family at her side. Pt pleasant and agreeable to treatment. Pt completed sit to stand with a rolling walker with cues to push up from the chair. Pt completed functional mobility in her room with a rolling walker and minimal assistance for safety and for sequencing with use of walker. Pt returned to her recliner chair and participated in very light AROM exercises.  Pt then completed functional mobility in her room again with the same distance (to the door) and same level of assistance as above. Pt participated in treatment with good effort and was left up in the recliner chair with belongings in reach and family at her side. Pt will continue to benefit from OT to increase progression toward above goals. This section established at most recent assessment   PROBLEM LIST (Impairments causing functional limitations):  1. Decreased Strength  2. Decreased ADL/Functional Activities  3. Decreased Transfer Abilities  4. Decreased Ambulation Ability/Technique  5. Decreased Balance  6. Decreased Activity Tolerance  7. Increased Fatigue  8. Edema/Girth    INTERVENTIONS PLANNED: (Benefits and precautions of occupational therapy have been discussed with the patient.)  1. Activities of daily living training  2. Adaptive equipment training  3. Group therapy  4. Theraputic activity  5. Theraputic exercise      TREATMENT PLAN: Frequency/Duration: Follow patient 3x/ week  to address above goals. Rehabilitation Potential For Stated Goals: GOOD      RECOMMENDED REHABILITATION/EQUIPMENT: (at time of discharge pending progress): Continue Skilled Therapy. OCCUPATIONAL PROFILE AND HISTORY:   History of Present Injury/Illness (Reason for Referral):  Per H&P, \"History of persistent atrial fibrillation. Noted when patient presented to the hospital with a PE. Echo at that time with noted moderate left ventricular systolic dysfunction with ejection fraction of 30-35% [4/17]. Also presented with acute renal failure/anemia in patient with improving renal function on discharge with creatinine noted at 1.42     Poor historian and no family around today. Appears dyspnea at rest. States issues would likely PND/orthopnea and coughing. Uncertain about weight gain but significant edema noted. \"  Past Medical History/Comorbidities:   Ms. Luis Carson  has a past medical history of Asthma (7/1/2015); Heartburn (7/1/2015); Hyperlipidemia (7/1/2015); Hypertension (7/1/2015);  Type 2 diabetes mellitus ([facility] [address] and Urge incontinence (7/1/2015). Ms. Molina Carroll  has a past surgical history that includes cataract removal (2011). Social History/Living Environment:   Home Environment: Rehabilitation facility  One/Two Story Residence: One story  Living Alone: Yes  Support Systems: Friends \ neighbors, Family member(s)  Patient Expects to be Discharged to[de-identified] Rehabilitation facility  Current DME Used/Available at Home: Eugena Pool, quad  Prior Level of Function/Work/Activity:  Patient lives alone and is fairly independent. She has been getting rehab since last admission. She wants to get back to home eventually. Number of Personal Factors/Comorbidities that affect the Plan of Care: Brief history (0):  LOW COMPLEXITY   ASSESSMENT OF OCCUPATIONAL PERFORMANCE[de-identified]   Activities of Daily Living:           Basic ADLs (From Assessment) Complex ADLs (From Assessment)   Basic ADL  Feeding: Setup  Oral Facial Hygiene/Grooming: Setup  Bathing: Moderate assistance  Upper Body Dressing: Minimum assistance  Lower Body Dressing: Maximum assistance  Toileting: Maximum assistance Instrumental ADL  Meal Preparation: Total assistance  Homemaking: Total assistance   Grooming/Bathing/Dressing Activities of Daily Living                             Bed/Mat Mobility  Sit to Stand: Minimum assistance          Most Recent Physical Functioning:   Gross Assessment:                  Posture:  Posture (WDL): Exceptions to WDL  Posture Assessment:  Forward head, Kyphosis, Rounded shoulders  Balance:  Sitting: Intact  Standing: Impaired  Standing - Static: Fair  Standing - Dynamic : Fair (-) Bed Mobility:     Wheelchair Mobility:     Transfers:  Sit to Stand: Minimum assistance  Stand to Sit: Minimum assistance                 Patient Vitals for the past 6 hrs:   BP BP Patient Position SpO2 O2 Flow Rate (L/min) Pulse   05/03/17 0755 118/81 At rest 100 % 2 l/min (!) 106   05/03/17 0912 - - 98 % 2 l/min -   05/03/17 1136 108/67 At rest 97 % 2 l/min (!) 109 Mental Status  Neurologic State: Alert  Orientation Level: Oriented X4  Cognition: Follows commands  Perception: Appears intact  Perseveration: No perseveration noted  Safety/Judgement: Awareness of environment, Fall prevention                               Physical Skills Involved:  1. Range of Motion  2. Balance  3. Mobility  4. Strength  5. Endurance Cognitive Skills Affected (resulting in the inability to perform in a timely and safe manner):  1. None Psychosocial Skills Affected:  1. Routines and Behaviors  2. Environmental Adaptations   Number of elements that affect the Plan of Care: 5+:  HIGH COMPLEXITY   CLINICAL DECISION MAKIN [facility] Mobility Inpatient Short Form  How much help from another person does the patient currently need. .. Total A Lot A Little None   1. Putting on and taking off regular lower body clothing?   [ ] 1   [X] 2   [ ] 3   [ ] 4   2. Bathing (including washing, rinsing, drying)? [ ] 1   [X] 2   [ ] 3   [ ] 4   3. Toileting, which includes using toilet, bedpan or urinal?   [ ] 1   [X] 2   [ ] 3   [ ] 4   4. Putting on and taking off regular upper body clothing?   [ ] 1   [ ] 2   [X] 3   [ ] 4   5. Taking care of personal grooming such as brushing teeth? [ ] 1   [ ] 2   [X] 3   [ ] 4   6. Eating meals? [ ] 1   [ ] 2   [X] 3   [ ] 4   © , Trustees of [address] [place] [address] [address] [address], under license to [employer]. All rights reserved    Score:  Initial: 15 Most Recent: X (Date: -- )     Interpretation of Tool:  Represents activities that are increasingly more difficult (i.e. Bed mobility, Transfers, Gait).        Score 24 23 22-20 19-15 14-10 9-7 6       Modifier CH CI CJ CK CL CM CN         · Self Care:               - CURRENT STATUS:    CK - 40%-59% impaired, limited or restricted               - GOAL STATUS:           CJ - 20%-39% impaired, limited or restricted               - D/C STATUS: ---------------To be determined---------------  Payor: FIRST CHOICE VIP CARE PLUS / [place] [employer] [place] VIP CARE PLUS / Product Type: Managed Care Medicare /       Medical Necessity:     · Patient demonstrates good rehab potential due to higher previous functional level. Reason for Services/Other Comments:  · Patient continues to require present interventions due to patient's inability to care for self at baseline level of function. Use of outcome tool(s) and clinical judgement create a POC that gives a: MODERATE COMPLEXITY             TREATMENT:   (In addition to Assessment/Re-Assessment sessions the following treatments were rendered)      Pre-treatment Symptoms/Complaints:  none  Pain: Initial:   Pain Intensity 1: 0  Post Session:  none      Therapeutic Activity: (15 minutes): Therapeutic activities including Chair transfers, sit to stand transfers, and static/dynamic standing to improve mobility, strength, balance, coordination and activity tolerance. Required minimal assistance to promote dynamic balance in standing. Therapeutic Exercise: (8 minutes):  Exercises per grid below to improve mobility and strength. Required minimal visual, verbal and manual cues to promote proper body mechanics. Progressed range as indicated.    Date:  5/3/17 Date:   Date:     Activity/Exercise Parameters Parameters Parameters   SROM shoulder flexion 8 reps     Shoulder protraction/retraction 8 reps     Cervical rotation 8 reps                                       Braces/Orthotics/Lines/Etc:   · IV  · O2 Device: Nasal cannula  Treatment/Session Assessment:    · Response to Treatment:  Tolerated well  · Interdisciplinary Collaboration:  · Certified Occupational Therapy Assistant and Registered Nurse  · After treatment position/precautions:  · Up in chair, Bed/Chair-wheels locked, Bed in low position, Call light within reach, RN notified and Family at bedside  · Compliance with Program/Exercises: compliant all of the time.  · Recommendations/Intent for next treatment session: \"Next visit will focus on advancements to more challenging activities and reduction in assistance provided\".   Total Treatment Duration:  OT Patient Time In/Time Out  Time In: 1035  Time Out: 845 Darell Heller

## 2017-05-03 NOTE — PROGRESS NOTES
Insurance authorization for rehab in progress. Pt will be able to dc to [facility] tomorrow if medically stable to do so.

## 2017-05-03 NOTE — CONSULTS
Discussed with Johana Rushing. Multiple conversations have occurred today with family regarding discharge plan, goals, etc.  Granddaughter has made decision to accept bed offer for STR and requested no further discussion at present.

## 2017-05-03 NOTE — PROGRESS NOTES
Problem: Falls - Risk of  Goal: *Absence of falls  Outcome: Progressing Towards Goal  Patient progressing towards goal with no falls on current admission. Patient without confusion, or agitation. Patient has unsteady gait on ambulation and uses one person assist to the bedside commode or bathroom. Personal belongings are within reach. Bed is in the low and locked position with side rails up x3 and bed alarm activated. Yellow gripper socks to bilateral feet. Call light within reach and patient verbalizes understanding of use.

## 2017-05-03 NOTE — PROGRESS NOTES
MEWS 4 at the Midnight VS due to elevated HR. Patient is a-fib and is on Amio 200mg Q12H and Metoprolol 37.5mg Q6H. Given midnight dose of Metoprolol. Patient assessed and no apparent distress. Charge nurse Roxana Calix RN informed of patient's situation. Will continue to monitor.

## 2017-05-03 NOTE — PROGRESS NOTES
[facility] CARDIOLOGY PROGRESS NOTE           5/3/2017 11:33 AM    Admit Date: 4/28/2017      Subjective:   Patient stated breathing is better. ROS:  Cardiovascular:  As noted above    Objective:      Vitals:    05/02/17 2025 05/03/17 0039 05/03/17 0125 05/03/17 0447   BP: 90/52 96/55  97/60   Pulse: (!) 109 (!) 111  (!) 110   Resp: 18 18  18   Temp: 97.3 °F (36.3 °C) 97.9 °F (36.6 °C)  97.5 °F (36.4 °C)   SpO2: 93% 99% 98% 98%   Weight:    59.5 kg (131 lb 1.6 oz)   Height:         Current Facility-Administered Medications   Medication Dose Route Frequency    furosemide (LASIX) tablet 40 mg  40 mg Oral DAILY    metoprolol tartrate (LOPRESSOR) tablet 37.5 mg  37.5 mg Oral Q6H    acetaminophen (TYLENOL) tablet 650 mg  650 mg Oral Q6H PRN    amiodarone (CORDARONE) tablet 200 mg  200 mg Oral Q12H    apixaban (ELIQUIS) tablet 5 mg  5 mg Oral Q12H    atorvastatin (LIPITOR) tablet 40 mg  40 mg Oral QHS    docusate sodium (COLACE) capsule 100 mg  100 mg Oral BID    levalbuterol (XOPENEX) nebulizer soln 0.63 mg/3 mL  0.63 mg Nebulization Q6H PRN    pantoprazole (PROTONIX) tablet 40 mg  40 mg Oral DAILY    sodium chloride (OCEAN) 0.65 % nasal spray 1 Spray  1 Spray Both Nostrils PRN    sodium chloride (NS) flush 5-10 mL  5-10 mL IntraVENous Q8H    sodium chloride (NS) flush 5-10 mL  5-10 mL IntraVENous PRN    morphine injection 2 mg  2 mg IntraVENous Q4H PRN    budesonide (PULMICORT) 500 mcg/2 ml nebulizer suspension  500 mcg Nebulization BID RT    And    albuterol CONCENTRATE 2.5mg/0.5 mL neb soln  2.5 mg Nebulization Q6H RT         Physical Exam:  General-No Acute Distress  Neck- supple, no JVD  CV- irreg irregular rate and rhythm   Lung- diminished at based with exp crackles. Abd- soft, nontender, nondistended  Ext- no edema bilaterally.  Grade 2 STAGE 1  Skin- warm and dry    Data Review:   Recent Labs      04/30/17   1143   TROIQ  0.06*       Assessment/Plan:     Principal Problem:    Volume overload (4/28/2017) patient on IV lasix 60 BID previosult. I&O cannot be recorded due to incontinence. Decreased lasix to 40 mg po will check labs today. Patient was seen by palliative care team on last admission. I would like for them to see the patient again. Discussed with patients daughter this am.       Active Problems:    Hypertension (7/1/2015)      Hyperlipidemia (7/1/2015)      PE (pulmonary thromboembolism) ([address] (4/4/2017)      Atrial fibrillation ([facility] [address] (4/4/2017) rate controlled in low 100's on AC. Patient less than 61Kg and 80years old will change apicxiban to lower dose.        Do not resuscitate (4/7/2017)          Tim Jones MD  5/3/2017 11:33 AM

## 2017-05-03 NOTE — PROGRESS NOTES
Bedside and Verbal shift change report given to self (oncoming nurse) by Sofia Medina (offgoing nurse). Report included the following information SBAR, Kardex, MAR and Recent Results.

## 2017-05-03 NOTE — PROGRESS NOTES
Bedside and Verbal shift change report given to Gerda Ulloa RN (oncoming nurse) by self(offgoing nurse). Report included the following information SBAR, Kardex, ED Summary, Procedure Summary, Intake/Output, MAR, Recent Results and Cardiac Rhythm a-fib 110s.

## 2017-05-04 VITALS
DIASTOLIC BLOOD PRESSURE: 62 MMHG | RESPIRATION RATE: 15 BRPM | TEMPERATURE: 97.8 F | SYSTOLIC BLOOD PRESSURE: 110 MMHG | OXYGEN SATURATION: 90 % | BODY MASS INDEX: 25.96 KG/M2 | HEIGHT: 59 IN | HEART RATE: 106 BPM | WEIGHT: 128.8 LBS

## 2017-05-04 PROBLEM — N17.9 ACUTE RENAL FAILURE (ARF) (HCC): Status: RESOLVED | Noted: 2017-04-07 | Resolved: 2017-05-04

## 2017-05-04 PROBLEM — I50.21 ACUTE SYSTOLIC (CONGESTIVE) HEART FAILURE (HCC): Status: ACTIVE | Noted: 2017-05-04

## 2017-05-04 LAB
ANION GAP BLD CALC-SCNC: 6 MMOL/L (ref 7–16)
BUN SERPL-MCNC: 25 MG/DL (ref 8–23)
CALCIUM SERPL-MCNC: 8.4 MG/DL (ref 8.3–10.4)
CHLORIDE SERPL-SCNC: 95 MMOL/L (ref 98–107)
CO2 SERPL-SCNC: 40 MMOL/L (ref 21–32)
CREAT SERPL-MCNC: 1.41 MG/DL (ref 0.6–1)
GLUCOSE SERPL-MCNC: 208 MG/DL (ref 65–100)
MM INDURATION POC: 0 MM (ref 0–5)
POTASSIUM SERPL-SCNC: 4 MMOL/L (ref 3.5–5.1)
PPD POC: NEGATIVE NEGATIVE
SODIUM SERPL-SCNC: 141 MMOL/L (ref 136–145)

## 2017-05-04 PROCEDURE — 74011250637 HC RX REV CODE- 250/637: Performed by: NURSE PRACTITIONER

## 2017-05-04 PROCEDURE — 74011000250 HC RX REV CODE- 250: Performed by: INTERNAL MEDICINE

## 2017-05-04 PROCEDURE — 94760 N-INVAS EAR/PLS OXIMETRY 1: CPT

## 2017-05-04 PROCEDURE — 94640 AIRWAY INHALATION TREATMENT: CPT

## 2017-05-04 PROCEDURE — 80048 BASIC METABOLIC PNL TOTAL CA: CPT | Performed by: PHYSICIAN ASSISTANT

## 2017-05-04 PROCEDURE — 36415 COLL VENOUS BLD VENIPUNCTURE: CPT | Performed by: PHYSICIAN ASSISTANT

## 2017-05-04 PROCEDURE — 74011250637 HC RX REV CODE- 250/637: Performed by: INTERNAL MEDICINE

## 2017-05-04 RX ORDER — METOPROLOL SUCCINATE 50 MG/1
50 TABLET, EXTENDED RELEASE ORAL 2 TIMES DAILY
Qty: 60 TAB | Refills: 6 | Status: SHIPPED
Start: 2017-05-04

## 2017-05-04 RX ORDER — FUROSEMIDE 40 MG/1
40 TABLET ORAL DAILY
Qty: 30 TAB | Refills: 6 | Status: SHIPPED
Start: 2017-05-04

## 2017-05-04 RX ORDER — METOPROLOL TARTRATE 75 MG/1
75 TABLET, FILM COATED ORAL EVERY 6 HOURS
Qty: 60 TAB | Refills: 6 | Status: SHIPPED
Start: 2017-05-04 | End: 2017-05-04

## 2017-05-04 RX ADMIN — DOCUSATE SODIUM 100 MG: 100 CAPSULE, LIQUID FILLED ORAL at 08:47

## 2017-05-04 RX ADMIN — METOPROLOL TARTRATE 37.5 MG: 25 TABLET ORAL at 01:02

## 2017-05-04 RX ADMIN — PANTOPRAZOLE SODIUM 40 MG: 40 TABLET, DELAYED RELEASE ORAL at 08:47

## 2017-05-04 RX ADMIN — AMIODARONE HYDROCHLORIDE 200 MG: 200 TABLET ORAL at 08:47

## 2017-05-04 RX ADMIN — METOPROLOL TARTRATE 37.5 MG: 25 TABLET ORAL at 05:23

## 2017-05-04 RX ADMIN — BUDESONIDE 500 MCG: 0.5 SUSPENSION RESPIRATORY (INHALATION) at 07:38

## 2017-05-04 RX ADMIN — APIXABAN 2.5 MG: 2.5 TABLET, FILM COATED ORAL at 08:47

## 2017-05-04 RX ADMIN — ALBUTEROL SULFATE 2.5 MG: 2.5 SOLUTION RESPIRATORY (INHALATION) at 02:10

## 2017-05-04 RX ADMIN — FUROSEMIDE 40 MG: 40 TABLET ORAL at 08:47

## 2017-05-04 RX ADMIN — Medication 5 ML: at 05:23

## 2017-05-04 RX ADMIN — ALBUTEROL SULFATE 2.5 MG: 2.5 SOLUTION RESPIRATORY (INHALATION) at 07:38

## 2017-05-04 NOTE — PROGRESS NOTES
[facility] CARDIOLOGY PROGRESS NOTE           5/4/2017 11:33 AM    Admit Date: 4/28/2017      Subjective:   Lying in bed, resting now, no CP. To SNF today. ROS:  Cardiovascular:  As noted above    Objective:      Vitals:    05/04/17 0059 05/04/17 0211 05/04/17 0517 05/04/17 0741   BP: 108/67  109/55    Pulse: (!) 104  (!) 109    Resp: 18  18    Temp: 98.4 °F (36.9 °C)  98.3 °F (36.8 °C)    SpO2: 99% 95% 97% 94%   Weight:   58.4 kg (128 lb 12.8 oz)    Height:         Current Facility-Administered Medications   Medication Dose Route Frequency    apixaban (ELIQUIS) tablet 2.5 mg  2.5 mg Oral Q12H    furosemide (LASIX) tablet 40 mg  40 mg Oral DAILY    metoprolol tartrate (LOPRESSOR) tablet 37.5 mg  37.5 mg Oral Q6H    acetaminophen (TYLENOL) tablet 650 mg  650 mg Oral Q6H PRN    amiodarone (CORDARONE) tablet 200 mg  200 mg Oral Q12H    atorvastatin (LIPITOR) tablet 40 mg  40 mg Oral QHS    docusate sodium (COLACE) capsule 100 mg  100 mg Oral BID    levalbuterol (XOPENEX) nebulizer soln 0.63 mg/3 mL  0.63 mg Nebulization Q6H PRN    pantoprazole (PROTONIX) tablet 40 mg  40 mg Oral DAILY    sodium chloride (OCEAN) 0.65 % nasal spray 1 Spray  1 Spray Both Nostrils PRN    sodium chloride (NS) flush 5-10 mL  5-10 mL IntraVENous Q8H    sodium chloride (NS) flush 5-10 mL  5-10 mL IntraVENous PRN    morphine injection 2 mg  2 mg IntraVENous Q4H PRN    budesonide (PULMICORT) 500 mcg/2 ml nebulizer suspension  500 mcg Nebulization BID RT    And    albuterol CONCENTRATE 2.5mg/0.5 mL neb soln  2.5 mg Nebulization Q6H RT         Physical Exam:  General-No Acute Distress  Neck- supple, no JVD  CV- irreg irregular rate and rhythm   Lung- diminished at based with exp crackles. Abd- soft, nontender, nondistended  Ext- no edema bilaterally.    Skin- warm and dry    Data Review:   Recent Labs      05/03/17   1107   NA  140   K  4.2   BUN  23   CREA  1.53*   GLU  157*   WBC  6.8   HGB  11.9 HCT  37.4   PLT  202       Assessment/Plan:     Principal Problem:    Volume overload (4/28/2017) better, on oral lasix. Check BMP this AM      Active Problems:    Hypertension (7/1/2015)      Hyperlipidemia (7/1/2015)      PE (pulmonary thromboembolism) ([facility] [address] (4/4/2017)      Atrial fibrillation ([facility] [address] (4/4/2017) rate controlled in low 100's on AC. Patient less than 61Kg and 80years old, dose of apicxiban to lower dose. Do not resuscitate (4/7/2017)    Daughter and family want SNF, not hospice at this time. Stable for d/c today after AM BMP, check Cr before d/c. Lasix has been decreased, we will need to follow kidney fxn as outpatient.         Sayra Hein DO  5/4/2017 8:34AM

## 2017-05-04 NOTE — PROGRESS NOTES
TRANSFER - OUT REPORT:    Verbal report given to Aviles Eyad on [employer]  being transferred to [facility] for routine progression of care       Report consisted of patients Situation, Background, Assessment and Recommendations(SBAR). Information from the following report(s) SBAR, Kardex, [facility] and Recent Results was reviewed with the receiving nurse. Opportunity for questions and clarification was provided.

## 2017-05-04 NOTE — DISCHARGE SUMMARY
[facility] Cardiology Discharge Summary     Patient ID:  Miguelito [place]  [id number]  82 y.o.  2/24/1926    Admit date: 4/28/2017    Discharge date:  5/4/2017    Admitting Physician: Curtis Ortega MD     Discharge Physician: JESUS Lord/Dr. Chico Hernández    Admission Diagnoses: CHF  Volume overload  Volume overload    Discharge Diagnoses:   Patient Active Problem List    Diagnosis Date Noted    Volume overload 04/28/2017    Abdominal pain 04/09/2017    Acute renal failure (ARF) ([facility] [address] 04/07/2017    Do not resuscitate 04/07/2017     Class: Chronic    Asthma 04/04/2017    PE (pulmonary thromboembolism) ([facility] [address] 04/04/2017    Atrial fibrillation ([facility] [address] 04/04/2017    Pleural effusion 04/04/2017    Debility 04/04/2017    Hypoxemia 04/04/2017    Hypertension 07/01/2015    Hyperlipidemia 07/01/2015    Heartburn 07/01/2015    Urge incontinence 07/01/2015       Cardiology Procedures this admission:  EchoCardiogram  Consults: Sarah Steward [id number] Course: Patient was seen in the office by Dr. Anita Josue in follow up. She was volume overloaded on exam and appeared short of breath at rest. She is a poor historian. She was unsure about weight gain. She was directly admitted to [facility] and was started on IV lasix for diuresis. She diuresed 2.9 liters. An echocardiogram was performed with report as follows:   -  Left ventricle: Systolic function was moderately reduced. Ejection fraction was estimated in the range of 30 % to 35 %. There was moderate diffuse hypokinesis with regional variations. -  Mitral valve: There was moderate to severe regurgitation. -  Tricuspid valve: There was moderate regurgitation. Amiodarone and BB were continued for rate control of a-fib. Her BP will not tolerate further titration of BB or addition of CCB. Palliative care was consulted. Multiple discussions were held regarding goals of care. Patient's daughter preferred that patient go to rehab at discharge. Rehab bed was arranged. The morning of 5/4/2017 patient was up feeling well without any complaints of shortness of breath. LE edema was much improved. Patient's labs were stable with creatinine of 1.53. Patient was determined stable and ready for discharge. Patient was instructed on the importance of medication compliance, low sodium diet, 2 liter per day fluid restriction and daily weights. For maximized medical therapy of congestive heart failure, patient will continue use of BB. BP will not tolerate ACE-I/ARB. The patient will have close transitional care follow up with [facility] Dr. Bebo De Leon on May 12th at 2:15pm THE [facility] [place]). DISPOSITION: The patient is being discharged home in stable condition on a low saturated fat, low cholesterol and low salt diet. The patient is instructed to advance activities as tolerated to the limit of fatigue or shortness of breath. The patient is informed to monitor daily weights and maintain a 2 liter per day fluid restriction. The patient is instructed to call the office for any shortness of breath, weight gain, or increased peripheral edema.         Discharge Exam:   Visit Vitals    /55 (BP 1 Location: Right arm, BP Patient Position: Head of bed elevated (Comment degrees))  Comment (BP Patient Position): 30 degrees    Pulse (!) 109    Temp 98.3 °F (36.8 °C)    Resp 18    Ht 4' 11\" (1.499 m)    Wt 58.4 kg (128 lb 12.8 oz)    SpO2 97%    BMI 26.01 kg/m2       Physical Exam:  General: Well Developed, Well Nourished, No Acute Distress, Alert & Oriented  Neck: supple, no JVD  Heart: S1S2 with IRR  Lungs: mostly clear throughout auscultation bilaterally  Abd: soft, nontender, nondistended, with good bowel sounds  Ext: warm, 1+ edema, calves supple/nontender, pulses 2+ bilaterally  Skin: warm and dry      Recent Results (from the past 24 hour(s))   METABOLIC PANEL, BASIC    Collection Time: 05/03/17 11:07 AM   Result Value Ref Range    Sodium 140 136 - 145 mmol/L    Potassium 4.2 3.5 - 5.1 mmol/L    Chloride 91 (L) 98 - 107 mmol/L    CO2 43 (HH) 21 - 32 mmol/L    Anion gap 6 (L) 7 - 16 mmol/L    Glucose 157 (H) 65 - 100 mg/dL    BUN 23 8 - 23 MG/DL    Creatinine 1.53 (H) 0.6 - 1.0 MG/DL    GFR est AA 41 (L) >60 ml/min/1.73m2    GFR est non-AA 34 (L) >60 ml/min/1.73m2    Calcium 8.6 8.3 - 10.4 MG/DL   CBC W/O DIFF    Collection Time: 05/03/17 11:07 AM   Result Value Ref Range    WBC 6.8 4.3 - 11.1 K/uL    RBC 3.57 (L) 4.05 - 5.25 M/uL    HGB 11.9 11.7 - 15.4 g/dL    HCT 37.4 35.8 - 46.3 %    .8 (H) 79.6 - 97.8 FL    MCH 33.3 (H) 26.1 - 32.9 PG    MCHC 31.8 31.4 - 35.0 g/dL    RDW 17.8 (H) 11.9 - 14.6 %    PLATELET 534 327 - 683 K/uL    MPV 9.4 (L) 10.8 - 14.1 FL   PLEASE READ & DOCUMENT PPD TEST IN 24 HRS    Collection Time: 05/03/17 12:50 PM   Result Value Ref Range    PPD Negative Negative    mm Induration 0 mm         Patient Instructions:   Current Discharge Medication List      START taking these medications    Details   furosemide (LASIX) 40 mg tablet Take 1 Tab by mouth daily. Qty: 30 Tab, Refills: 6      metoprolol succinate (TOPROL-XL) 50 mg XL tablet Take 1 Tab by mouth two (2) times a day. Qty: 60 Tab, Refills: 6         CONTINUE these medications which have CHANGED    Details   apixaban (ELIQUIS) 2.5 mg tablet Take 1 Tab by mouth every twelve (12) hours. Qty: 60 Tab, Refills: 11         CONTINUE these medications which have NOT CHANGED    Details   fluticasone-vilanterol (BREO ELLIPTA) 100-25 mcg/dose inhaler Take 1 Puff by inhalation daily. amiodarone (CORDARONE) 200 mg tablet Take 1 Tab by mouth every twelve (12) hours. Qty: 60 Tab, Refills: 11      docusate sodium (COLACE) 100 mg capsule Take 1 Cap by mouth two (2) times a day. Qty: 60 Cap, Refills: 11      sodium chloride (OCEAN) 0.65 % nasal spray 1 Anita by Both Nostrils route as needed for Congestion (Keep at bedside for PRN use.).   Qty: 15 mL, Refills: 11      levalbuterol (XOPENEX) 0.63 mg/3 mL nebu 3 mL by Nebulization route every six (6) hours as needed. Qty: 540 Package, Refills: 11      CERTAVITE SENIOR-ANTIOXIDANT 0.4-300-250 mg-mcg-mcg tab Take 1 Tab by mouth daily. Qty: 90 Tab, Refills: 3      atorvastatin (LIPITOR) 40 mg tablet Take 1 Tab by mouth nightly. Qty: 90 Tab, Refills: 3      pantoprazole (PROTONIX) 20 mg tablet Take 1 Tab by mouth daily. Indications: HEARTBURN  Qty: 90 Tab, Refills: 2      loratadine (CLARITIN) 10 mg tablet Take 1 Tab by mouth daily. Qty: 90 Tab, Refills: 3      acetaminophen (TYLENOL) 650 mg CR tablet Take 1 Tab by mouth every eight (8) hours. 2 TABS  Qty: 90 Tab, Refills: 11      CALCIUM PHOSPHATE/VITAMIN D2 (CALCIUM-VITAMIN D2 PO) Take  by mouth three (3) times daily.          STOP taking these medications       metoprolol tartrate (LOPRESSOR) 25 mg tablet Comments:   Reason for Stopping:         niacin ER (NIASPAN) 500 mg tablet Comments:   Reason for Stopping:         ENTERIC COATED ASPIRIN PO Comments:   Reason for Stopping:                 Signed:  Callie Thapa PA-C  5/4/2017  7:25 AM

## 2017-05-04 NOTE — DISCHARGE INSTRUCTIONS
Atrial Fibrillation: Care Instructions  Your Care Instructions    Atrial fibrillation is an irregular and often fast heartbeat. Treating this condition is important for several reasons. It can cause blood clots, which can travel from your heart to your brain and cause a stroke. If you have a fast heartbeat, you may feel lightheaded, dizzy, and weak. An irregular heartbeat can also increase your risk for heart failure. Atrial fibrillation is often the result of another heart condition, such as high blood pressure or coronary artery disease. Making changes to improve your heart condition will help you stay healthy and active. Follow-up care is a key part of your treatment and safety. Be sure to make and go to all appointments, and call your doctor if you are having problems. It's also a good idea to know your test results and keep a list of the medicines you take. How can you care for yourself at home? Medicines  · Take your medicines exactly as prescribed. Call your doctor if you think you are having a problem with your medicine. You will get more details on the specific medicines your doctor prescribes. · If your doctor has given you a blood thinner to prevent a stroke, be sure you get instructions about how to take your medicine safely. Blood thinners can cause serious bleeding problems. · Do not take any vitamins, over-the-counter drugs, or herbal products without talking to your doctor first.  Lifestyle changes  · Do not smoke. Smoking can increase your chance of a stroke and heart attack. If you need help quitting, talk to your doctor about stop-smoking programs and medicines. These can increase your chances of quitting for good. · Eat a heart-healthy diet. · Stay at a healthy weight. Lose weight if you need to. · Limit alcohol to 2 drinks a day for men and 1 drink a day for women. Too much alcohol can cause health problems. · Avoid colds and flu. Get a pneumococcal vaccine shot.  If you have had one before, ask your doctor whether you need another dose. Get a flu shot every year. If you must be around people with colds or flu, wash your hands often. Activity  · If your doctor recommends it, get more exercise. Walking is a good choice. Bit by bit, increase the amount you walk every day. Try for at least 30 minutes on most days of the week. You also may want to swim, bike, or do other activities. Your doctor may suggest that you join a cardiac rehabilitation program so that you can have help increasing your physical activity safely. · Start light exercise if your doctor says it is okay. Even a small amount will help you get stronger, have more energy, and manage stress. Walking is an easy way to get exercise. Start out by walking a little more than you did in the hospital. Gradually increase the amount you walk. · When you exercise, watch for signs that your heart is working too hard. You are pushing too hard if you cannot talk while you are exercising. If you become short of breath or dizzy or have chest pain, sit down and rest immediately. · Check your pulse regularly. Place two fingers on the artery at the palm side of your wrist, in line with your thumb. If your heartbeat seems uneven or fast, talk to your doctor. When should you call for help? Call 911 anytime you think you may need emergency care. For example, call if:  · You have symptoms of a heart attack. These may include:  ¨ Chest pain or pressure, or a strange feeling in the chest.  ¨ Sweating. ¨ Shortness of breath. ¨ Nausea or vomiting. ¨ Pain, pressure, or a strange feeling in the back, neck, jaw, or upper belly or in one or both shoulders or arms. ¨ Lightheadedness or sudden weakness. ¨ A fast or irregular heartbeat. After you call 911, the  may tell you to chew 1 adult-strength or 2 to 4 low-dose aspirin. Wait for an ambulance. Do not try to drive yourself. · You have symptoms of a stroke.  These may include:  ¨ Sudden numbness, tingling, weakness, or loss of movement in your face, arm, or leg, especially on only one side of your body. ¨ Sudden vision changes. ¨ Sudden trouble speaking. ¨ Sudden confusion or trouble understanding simple statements. ¨ Sudden problems with walking or balance. ¨ A sudden, severe headache that is different from past headaches. · You passed out (lost consciousness). Call your doctor now or seek immediate medical care if:  · You have new or increased shortness of breath. · You feel dizzy or lightheaded, or you feel like you may faint. · Your heart rate becomes irregular. · You can feel your heart flutter in your chest or skip heartbeats. Tell your doctor if these symptoms are new or worse. Watch closely for changes in your health, and be sure to contact your doctor if you have any problems. Where can you learn more? Go to http://michael-mauro.info/. Enter U020 in the search box to learn more about \"Atrial Fibrillation: Care Instructions. \"  Current as of: January 27, 2016  Content Version: 11.2  © 9797-6648 [employer]. Care instructions adapted under license by [employer] (which disclaims liability or warranty for this information). If you have questions about a medical condition or this instruction, always ask your healthcare professional. [address] any warranty or liability for your use of this information. DISCHARGE SUMMARY from Nurse    The following personal items are in your possession at time of discharge:    Dental Appliances: None  Visual Aid: Glasses     Home Medications: None  Jewelry: None  Clothing:  At bedside  Other Valuables: None             PATIENT INSTRUCTIONS:    After general anesthesia or intravenous sedation, for 24 hours or while taking prescription Narcotics:  · Limit your activities  · Do not drive and operate hazardous machinery  · Do not make important personal or business decisions  · Do  not drink alcoholic beverages  · If you have not urinated within 8 hours after discharge, please contact your surgeon on call. Report the following to your surgeon:  · Excessive pain, swelling, redness or odor of or around the surgical area  · Temperature over 100.5  · Nausea and vomiting lasting longer than 4 hours or if unable to take medications  · Any signs of decreased circulation or nerve impairment to extremity: change in color, persistent  numbness, tingling, coldness or increase pain  · Any questions          *  Please give a list of your current medications to your Primary Care Provider. *  Please update this list whenever your medications are discontinued, doses are      changed, or new medications (including over-the-counter products) are added. *  Please carry medication information at all times in case of emergency situations. These are general instructions for a healthy lifestyle:    No smoking/ No tobacco products/ Avoid exposure to second hand smoke    Surgeon General's Warning:  Quitting smoking now greatly reduces serious risk to your health. Obesity, smoking, and sedentary lifestyle greatly increases your risk for illness    A healthy diet, regular physical exercise & weight monitoring are important for maintaining a healthy lifestyle    You may be retaining fluid if you have a history of heart failure or if you experience any of the following symptoms:  Weight gain of 3 pounds or more overnight or 5 pounds in a week, increased swelling in our hands or feet or shortness of breath while lying flat in bed. Please call your doctor as soon as you notice any of these symptoms; do not wait until your next office visit.     Recognize signs and symptoms of STROKE:    F-face looks uneven    A-arms unable to move or move unevenly    S-speech slurred or non-existent    T-time-call 911 as soon as signs and symptoms begin-DO NOT go       Back to bed or wait to see if you get better-TIME IS BRAIN. Warning Signs of HEART ATTACK     Call 911 if you have these symptoms:   Chest discomfort. Most heart attacks involve discomfort in the center of the chest that lasts more than a few minutes, or that goes away and comes back. It can feel like uncomfortable pressure, squeezing, fullness, or pain.  Discomfort in other areas of the upper body. Symptoms can include pain or discomfort in one or both arms, the back, neck, jaw, or stomach.  Shortness of breath with or without chest discomfort.  Other signs may include breaking out in a cold sweat, nausea, or lightheadedness. Don't wait more than five minutes to call 911 - MINUTES MATTER! Fast action can save your life. Calling 911 is almost always the fastest way to get lifesaving treatment. Emergency Medical Services staff can begin treatment when they arrive -- up to an hour sooner than if someone gets to the hospital by car. The discharge information has been reviewed with the patient. The patient verbalized understanding. Discharge medications reviewed with the patient and appropriate educational materials and side effects teaching were provided.

## 2017-05-04 NOTE — PROGRESS NOTES
Verbal bedside report given to oncoming RNMolly. Patient's situation, background, assessment and recommendations provided. Opportunity for questions provided. Oncoming RN assumed care of patient.

## 2017-05-04 NOTE — PROGRESS NOTES
Care Management Interventions  Plan discussed with Pt/Family/Caregiver: Yes  Freedom of Choice Offered: Yes  Discharge Location  Discharge Placement: Rehab Unit Subacute    DC to [facility] for rehab with transport via [employer].

## 2017-05-04 NOTE — PROGRESS NOTES
Bedside shift change report received from [facility]. Report included the following information SBAR, Kardex, MAR and Recent Results.

## 2017-05-05 ENCOUNTER — PATIENT OUTREACH (OUTPATIENT)
Dept: CASE MANAGEMENT | Age: 82
End: 2017-05-05

## 2017-05-05 NOTE — PROGRESS NOTES
Per [facility] patient discharged to a Preferred Provider [facility]) on 05/04/2017, patient will be included in weekly care coordination call. Notification of patient discharge will be sent to Vianey Parks RN [facility]. No [facility] Outreach This note will not be viewable in [facility].

## 2017-05-17 ENCOUNTER — HOSPITAL ENCOUNTER (OUTPATIENT)
Dept: LAB | Age: 82
Discharge: HOME | End: 2017-05-17

## 2017-05-17 LAB — CREAT SERPL-MCNC: 1.45 MG/DL (ref 0.6–1)

## 2017-05-17 PROCEDURE — 82565 ASSAY OF CREATININE: CPT

## 2017-05-22 ENCOUNTER — HOSPITAL ENCOUNTER (OUTPATIENT)
Dept: CT IMAGING | Age: 82
Discharge: HOME | End: 2017-05-22
Attending: NURSE PRACTITIONER
Payer: COMMERCIAL

## 2017-05-22 VITALS — WEIGHT: 129 LBS | BODY MASS INDEX: 26.05 KG/M2

## 2017-05-22 DIAGNOSIS — R19.07 ABDOMINAL OR PELVIC SWELLING, MASS, OR LUMP, GENERALIZED: ICD-10-CM

## 2017-05-22 PROCEDURE — 74177 CT ABD & PELVIS W/CONTRAST: CPT

## 2017-05-22 PROCEDURE — 74011250636 HC RX REV CODE- 250/636

## 2017-05-22 PROCEDURE — 74011636320 HC RX REV CODE- 636/320

## 2017-05-22 PROCEDURE — 74011000258 HC RX REV CODE- 258

## 2017-05-22 RX ORDER — SODIUM CHLORIDE 0.9 % (FLUSH) 0.9 %
10 SYRINGE (ML) INJECTION
Status: COMPLETED | OUTPATIENT
Start: 2017-05-22 | End: 2017-05-22

## 2017-05-22 RX ORDER — SODIUM CHLORIDE 9 MG/ML
58 INJECTION, SOLUTION INTRAVENOUS CONTINUOUS
Status: DISCONTINUED | OUTPATIENT
Start: 2017-05-22 | End: 2017-05-26 | Stop reason: HOSPADM

## 2017-05-22 RX ADMIN — SODIUM CHLORIDE 58 ML/HR: 900 INJECTION, SOLUTION INTRAVENOUS at 09:20

## 2017-05-22 RX ADMIN — SODIUM CHLORIDE 100 ML: 900 INJECTION, SOLUTION INTRAVENOUS at 12:46

## 2017-05-22 RX ADMIN — Medication 10 ML: at 12:46

## 2017-05-22 RX ADMIN — IOPAMIDOL 100 ML: 755 INJECTION, SOLUTION INTRAVENOUS at 12:45

## 2017-05-22 RX ADMIN — DIATRIZOATE MEGLUMINE AND DIATRIZOATE SODIUM 15 ML: 660; 100 LIQUID ORAL; RECTAL at 12:46

## 2017-06-21 ENCOUNTER — HOME HEALTH ADMISSION (OUTPATIENT)
Dept: HOME HEALTH SERVICES | Facility: HOME HEALTH | Age: 82
End: 2017-06-21

## 2021-10-22 ENCOUNTER — APPOINTMENT (OUTPATIENT)
Dept: GENERAL RADIOLOGY | Age: 86
DRG: 565 | End: 2021-10-22
Attending: EMERGENCY MEDICINE
Payer: COMMERCIAL

## 2021-10-22 ENCOUNTER — HOSPITAL ENCOUNTER (INPATIENT)
Age: 86
LOS: 6 days | Discharge: NURSING, REHAB OR LONG-TERM CARE | DRG: 565 | End: 2021-10-28
Attending: EMERGENCY MEDICINE | Admitting: FAMILY MEDICINE
Payer: COMMERCIAL

## 2021-10-22 DIAGNOSIS — T79.6XXA TRAUMATIC RHABDOMYOLYSIS, INITIAL ENCOUNTER (HCC): Primary | ICD-10-CM

## 2021-10-22 DIAGNOSIS — Z60.2 LIVING ALONE: ICD-10-CM

## 2021-10-22 PROBLEM — M62.82 RHABDOMYOLYSIS: Status: ACTIVE | Noted: 2021-10-22

## 2021-10-22 LAB
ALBUMIN SERPL-MCNC: 3.2 G/DL (ref 3.2–4.6)
ALBUMIN/GLOB SERPL: 0.6 {RATIO} (ref 1.2–3.5)
ALP SERPL-CCNC: 66 U/L (ref 50–136)
ALT SERPL-CCNC: 33 U/L (ref 12–65)
ANION GAP SERPL CALC-SCNC: 8 MMOL/L (ref 7–16)
AST SERPL-CCNC: 42 U/L (ref 15–37)
ATRIAL RATE: 147 BPM
BASOPHILS # BLD: 0 K/UL (ref 0–0.2)
BASOPHILS NFR BLD: 0 % (ref 0–2)
BILIRUB SERPL-MCNC: 1.3 MG/DL (ref 0.2–1.1)
BUN SERPL-MCNC: 24 MG/DL (ref 8–23)
CALCIUM SERPL-MCNC: 9.5 MG/DL (ref 8.3–10.4)
CALCULATED P AXIS, ECG09: 103 DEGREES
CALCULATED R AXIS, ECG10: 76 DEGREES
CALCULATED T AXIS, ECG11: 38 DEGREES
CHLORIDE SERPL-SCNC: 104 MMOL/L (ref 98–107)
CK SERPL-CCNC: 831 U/L (ref 21–215)
CO2 SERPL-SCNC: 29 MMOL/L (ref 21–32)
CREAT SERPL-MCNC: 1.23 MG/DL (ref 0.6–1)
DIAGNOSIS, 93000: NORMAL
DIFFERENTIAL METHOD BLD: ABNORMAL
EOSINOPHIL # BLD: 0 K/UL (ref 0–0.8)
EOSINOPHIL NFR BLD: 0 % (ref 0.5–7.8)
ERYTHROCYTE [DISTWIDTH] IN BLOOD BY AUTOMATED COUNT: 11.9 % (ref 11.9–14.6)
EST. AVERAGE GLUCOSE BLD GHB EST-MCNC: 137 MG/DL
GLOBULIN SER CALC-MCNC: 5 G/DL (ref 2.3–3.5)
GLUCOSE BLD STRIP.AUTO-MCNC: 143 MG/DL (ref 65–100)
GLUCOSE BLD STRIP.AUTO-MCNC: 194 MG/DL (ref 65–100)
GLUCOSE SERPL-MCNC: 156 MG/DL (ref 65–100)
HBA1C MFR BLD: 6.4 % (ref 4.2–6.3)
HCT VFR BLD AUTO: 39.8 % (ref 35.8–46.3)
HGB BLD-MCNC: 12.9 G/DL (ref 11.7–15.4)
IMM GRANULOCYTES # BLD AUTO: 0 K/UL (ref 0–0.5)
IMM GRANULOCYTES NFR BLD AUTO: 0 % (ref 0–5)
LYMPHOCYTES # BLD: 0.9 K/UL (ref 0.5–4.6)
LYMPHOCYTES NFR BLD: 10 % (ref 13–44)
MCH RBC QN AUTO: 32.7 PG (ref 26.1–32.9)
MCHC RBC AUTO-ENTMCNC: 32.4 G/DL (ref 31.4–35)
MCV RBC AUTO: 101 FL (ref 79.6–97.8)
MONOCYTES # BLD: 0.7 K/UL (ref 0.1–1.3)
MONOCYTES NFR BLD: 8 % (ref 4–12)
NEUTS SEG # BLD: 7.4 K/UL (ref 1.7–8.2)
NEUTS SEG NFR BLD: 82 % (ref 43–78)
NRBC # BLD: 0 K/UL (ref 0–0.2)
P-R INTERVAL, ECG05: 176 MS
PLATELET # BLD AUTO: 194 K/UL (ref 150–450)
PMV BLD AUTO: 11 FL (ref 9.4–12.3)
POTASSIUM SERPL-SCNC: 4 MMOL/L (ref 3.5–5.1)
PROT SERPL-MCNC: 8.2 G/DL (ref 6.3–8.2)
Q-T INTERVAL, ECG07: 372 MS
QRS DURATION, ECG06: 74 MS
QTC CALCULATION (BEZET), ECG08: 447 MS
RBC # BLD AUTO: 3.94 M/UL (ref 4.05–5.2)
SERVICE CMNT-IMP: ABNORMAL
SERVICE CMNT-IMP: ABNORMAL
SODIUM SERPL-SCNC: 141 MMOL/L (ref 136–145)
T4 FREE SERPL-MCNC: 1.5 NG/DL (ref 0.78–1.46)
TSH SERPL DL<=0.005 MIU/L-ACNC: 1.06 UIU/ML (ref 0.36–3.74)
VENTRICULAR RATE, ECG03: 87 BPM
WBC # BLD AUTO: 9 K/UL (ref 4.3–11.1)

## 2021-10-22 PROCEDURE — 82962 GLUCOSE BLOOD TEST: CPT

## 2021-10-22 PROCEDURE — 80053 COMPREHEN METABOLIC PANEL: CPT

## 2021-10-22 PROCEDURE — 82550 ASSAY OF CK (CPK): CPT

## 2021-10-22 PROCEDURE — 74011250637 HC RX REV CODE- 250/637: Performed by: EMERGENCY MEDICINE

## 2021-10-22 PROCEDURE — 74011250636 HC RX REV CODE- 250/636: Performed by: FAMILY MEDICINE

## 2021-10-22 PROCEDURE — 74011636637 HC RX REV CODE- 636/637: Performed by: FAMILY MEDICINE

## 2021-10-22 PROCEDURE — 74011250637 HC RX REV CODE- 250/637: Performed by: FAMILY MEDICINE

## 2021-10-22 PROCEDURE — 83036 HEMOGLOBIN GLYCOSYLATED A1C: CPT

## 2021-10-22 PROCEDURE — 74011000250 HC RX REV CODE- 250: Performed by: FAMILY MEDICINE

## 2021-10-22 PROCEDURE — 84439 ASSAY OF FREE THYROXINE: CPT

## 2021-10-22 PROCEDURE — 85025 COMPLETE CBC W/AUTO DIFF WBC: CPT

## 2021-10-22 PROCEDURE — 93005 ELECTROCARDIOGRAM TRACING: CPT | Performed by: EMERGENCY MEDICINE

## 2021-10-22 PROCEDURE — 74011250636 HC RX REV CODE- 250/636: Performed by: EMERGENCY MEDICINE

## 2021-10-22 PROCEDURE — 77030040393 HC DRSG OPTIFOAM GENT MDII -B

## 2021-10-22 PROCEDURE — 86580 TB INTRADERMAL TEST: CPT | Performed by: FAMILY MEDICINE

## 2021-10-22 PROCEDURE — 94760 N-INVAS EAR/PLS OXIMETRY 1: CPT

## 2021-10-22 PROCEDURE — 2709999900 HC NON-CHARGEABLE SUPPLY

## 2021-10-22 PROCEDURE — 77030040830 HC CATH URETH FOL MDII -A

## 2021-10-22 PROCEDURE — 73502 X-RAY EXAM HIP UNI 2-3 VIEWS: CPT

## 2021-10-22 PROCEDURE — 65270000029 HC RM PRIVATE

## 2021-10-22 PROCEDURE — 71046 X-RAY EXAM CHEST 2 VIEWS: CPT

## 2021-10-22 PROCEDURE — 84443 ASSAY THYROID STIM HORMONE: CPT

## 2021-10-22 PROCEDURE — 81003 URINALYSIS AUTO W/O SCOPE: CPT

## 2021-10-22 PROCEDURE — 99285 EMERGENCY DEPT VISIT HI MDM: CPT

## 2021-10-22 PROCEDURE — 73110 X-RAY EXAM OF WRIST: CPT

## 2021-10-22 RX ORDER — LANOLIN ALCOHOL/MO/W.PET/CERES
400 CREAM (GRAM) TOPICAL DAILY
Status: DISCONTINUED | OUTPATIENT
Start: 2021-10-23 | End: 2021-10-28 | Stop reason: HOSPADM

## 2021-10-22 RX ORDER — ACETAMINOPHEN 650 MG/1
650 SUPPOSITORY RECTAL
Status: DISCONTINUED | OUTPATIENT
Start: 2021-10-22 | End: 2021-10-28 | Stop reason: HOSPADM

## 2021-10-22 RX ORDER — PANTOPRAZOLE SODIUM 40 MG/1
40 TABLET, DELAYED RELEASE ORAL DAILY
Status: DISCONTINUED | OUTPATIENT
Start: 2021-10-23 | End: 2021-10-28 | Stop reason: HOSPADM

## 2021-10-22 RX ORDER — ONDANSETRON 4 MG/1
4 TABLET, ORALLY DISINTEGRATING ORAL
Status: DISCONTINUED | OUTPATIENT
Start: 2021-10-22 | End: 2021-10-28 | Stop reason: HOSPADM

## 2021-10-22 RX ORDER — SODIUM CHLORIDE 0.9 % (FLUSH) 0.9 %
5-40 SYRINGE (ML) INJECTION AS NEEDED
Status: DISCONTINUED | OUTPATIENT
Start: 2021-10-22 | End: 2021-10-28 | Stop reason: HOSPADM

## 2021-10-22 RX ORDER — LANOLIN ALCOHOL/MO/W.PET/CERES
1000 CREAM (GRAM) TOPICAL DAILY
Status: DISCONTINUED | OUTPATIENT
Start: 2021-10-23 | End: 2021-10-28 | Stop reason: HOSPADM

## 2021-10-22 RX ORDER — METOPROLOL SUCCINATE 50 MG/1
50 TABLET, EXTENDED RELEASE ORAL 2 TIMES DAILY
Status: DISCONTINUED | OUTPATIENT
Start: 2021-10-22 | End: 2021-10-28 | Stop reason: HOSPADM

## 2021-10-22 RX ORDER — CETIRIZINE HYDROCHLORIDE 5 MG/1
10 TABLET ORAL DAILY
Status: DISCONTINUED | OUTPATIENT
Start: 2021-10-23 | End: 2021-10-28 | Stop reason: HOSPADM

## 2021-10-22 RX ORDER — POLYETHYLENE GLYCOL 3350 17 G/17G
17 POWDER, FOR SOLUTION ORAL DAILY PRN
Status: DISCONTINUED | OUTPATIENT
Start: 2021-10-22 | End: 2021-10-28 | Stop reason: HOSPADM

## 2021-10-22 RX ORDER — NYSTATIN 100000 [USP'U]/G
POWDER TOPICAL 2 TIMES DAILY
Status: DISCONTINUED | OUTPATIENT
Start: 2021-10-22 | End: 2021-10-28 | Stop reason: HOSPADM

## 2021-10-22 RX ORDER — ACETAMINOPHEN 325 MG/1
650 TABLET ORAL
Status: DISCONTINUED | OUTPATIENT
Start: 2021-10-22 | End: 2021-10-28 | Stop reason: HOSPADM

## 2021-10-22 RX ORDER — ONDANSETRON 2 MG/ML
4 INJECTION INTRAMUSCULAR; INTRAVENOUS
Status: DISCONTINUED | OUTPATIENT
Start: 2021-10-22 | End: 2021-10-28 | Stop reason: HOSPADM

## 2021-10-22 RX ORDER — SODIUM CHLORIDE 9 MG/ML
50 INJECTION, SOLUTION INTRAVENOUS CONTINUOUS
Status: DISPENSED | OUTPATIENT
Start: 2021-10-22 | End: 2021-10-23

## 2021-10-22 RX ORDER — DOCUSATE SODIUM 100 MG/1
100 CAPSULE, LIQUID FILLED ORAL 2 TIMES DAILY
Status: DISCONTINUED | OUTPATIENT
Start: 2021-10-22 | End: 2021-10-26

## 2021-10-22 RX ORDER — BUDESONIDE AND FORMOTEROL FUMARATE DIHYDRATE 160; 4.5 UG/1; UG/1
2 AEROSOL RESPIRATORY (INHALATION) 2 TIMES DAILY
Status: DISCONTINUED | OUTPATIENT
Start: 2021-10-22 | End: 2021-10-28 | Stop reason: HOSPADM

## 2021-10-22 RX ORDER — LORAZEPAM 1 MG/1
1 TABLET ORAL
Status: DISCONTINUED | OUTPATIENT
Start: 2021-10-22 | End: 2021-10-22

## 2021-10-22 RX ORDER — LORAZEPAM 2 MG/ML
1 INJECTION INTRAMUSCULAR
Status: DISCONTINUED | OUTPATIENT
Start: 2021-10-22 | End: 2021-10-28 | Stop reason: HOSPADM

## 2021-10-22 RX ORDER — NYSTATIN 100000 [USP'U]/G
POWDER TOPICAL 2 TIMES DAILY
Status: DISCONTINUED | OUTPATIENT
Start: 2021-10-22 | End: 2021-10-22

## 2021-10-22 RX ORDER — SODIUM CHLORIDE 0.9 % (FLUSH) 0.9 %
5-40 SYRINGE (ML) INJECTION EVERY 8 HOURS
Status: DISCONTINUED | OUTPATIENT
Start: 2021-10-22 | End: 2021-10-28 | Stop reason: HOSPADM

## 2021-10-22 RX ORDER — AMIODARONE HYDROCHLORIDE 200 MG/1
200 TABLET ORAL DAILY
Status: DISCONTINUED | OUTPATIENT
Start: 2021-10-23 | End: 2021-10-28 | Stop reason: HOSPADM

## 2021-10-22 RX ORDER — SENNOSIDES 8.6 MG/1
1 TABLET ORAL DAILY
Status: DISCONTINUED | OUTPATIENT
Start: 2021-10-23 | End: 2021-10-26

## 2021-10-22 RX ORDER — LEVOTHYROXINE SODIUM 50 UG/1
25 TABLET ORAL
Status: DISCONTINUED | OUTPATIENT
Start: 2021-10-23 | End: 2021-10-28 | Stop reason: HOSPADM

## 2021-10-22 RX ORDER — INSULIN LISPRO 100 [IU]/ML
INJECTION, SOLUTION INTRAVENOUS; SUBCUTANEOUS
Status: DISCONTINUED | OUTPATIENT
Start: 2021-10-22 | End: 2021-10-28 | Stop reason: HOSPADM

## 2021-10-22 RX ADMIN — INSULIN LISPRO 2 UNITS: 100 INJECTION, SOLUTION INTRAVENOUS; SUBCUTANEOUS at 21:39

## 2021-10-22 RX ADMIN — NYSTATIN: 100000 POWDER TOPICAL at 21:14

## 2021-10-22 RX ADMIN — TUBERCULIN PURIFIED PROTEIN DERIVATIVE 5 UNITS: 5 INJECTION, SOLUTION INTRADERMAL at 21:40

## 2021-10-22 RX ADMIN — LORAZEPAM 1 MG: 2 INJECTION INTRAMUSCULAR; INTRAVENOUS at 22:31

## 2021-10-22 RX ADMIN — METOPROLOL SUCCINATE 50 MG: 50 TABLET, EXTENDED RELEASE ORAL at 18:24

## 2021-10-22 RX ADMIN — SODIUM CHLORIDE 50 ML/HR: 900 INJECTION, SOLUTION INTRAVENOUS at 21:15

## 2021-10-22 RX ADMIN — DOCUSATE SODIUM 100 MG: 100 CAPSULE, LIQUID FILLED ORAL at 18:24

## 2021-10-22 SDOH — SOCIAL STABILITY - SOCIAL INSECURITY: PROBLEMS RELATED TO LIVING ALONE: Z60.2

## 2021-10-22 NOTE — H&P
Hospitalist Admission History and Physical     NAME:  Van Benjamin   Age:  80 y.o.  :   1926   MRN:   [id number]  PCP: Chang Kurtz MD  Consulting MD:  Treatment Team: Attending Provider: Javad Kwan DO; Primary Nurse: Maribel Howell RN    Chief Complaint   Patient presents with   Leesasintia Castelan         HPI:   Patient is a 80 y.o. female who presented to the ED after being found on floor for past day. Last seen two days ago. [facility] office was called for welfare check. Denies syncope or dizziness. She states she just fell and could not get back up. Daughter in room states mother has had falls in the past week. Hx of dementia, parkinson dx but I do not see any medications she takes for this, HTN, HLD, GERD, hypothyroidism, pulmonary embolus in 2017, EF 30% in 2017, a fib on xarelto, CKD stage 3, and DM type II. Vitals - temp 100.1,     Left wrist x ray - Bony fragment projecting off the dorsal margin of the proximal carpal row. Findings could represent the sequela of degenerative change, though a triquetral fracture could have this appearance as well. Consider correlation with MRI for further evaluation if it will alter the clinical management.   Past Medical History:   Diagnosis Date    Asthma 2015    Heartburn 2015    Hyperlipidemia 2015    Hypertension 2015    Type 2 diabetes mellitus (HCC)     Urge incontinence 2015        Past Surgical History:   Procedure Laterality Date    HX CATARACT REMOVAL          Family History   Problem Relation Age of Onset    Diabetes Father     Diabetes Other     Cancer Other     Breast Cancer Child         2 daughters had breast cancer    Breast Cancer Sister        Social History     Social History Narrative    Not on file        Social History     Tobacco Use    Smoking status: Never Smoker    Smokeless tobacco: Never Used   Substance Use Topics    Alcohol use: No        Social History     Substance and Sexual Activity   Drug Use No         No Known Allergies    Prior to Admission medications    Medication Sig Start Date End Date Taking? Authorizing Provider   acetaminophen (TYLENOL) 650 mg CR tablet Take 650 mg by mouth every six (6) hours as needed for Pain. Provider, Historical   Cholecalciferol, Vitamin D3, 50,000 unit cap Take  by mouth. Provider, Historical   senna (SENNA) 8.6 mg tablet Take 1 Tab by mouth daily. Provider, Historical   cyanocobalamin (VITAMIN B-12) 1,000 mcg tablet Take 1,000 mcg by mouth daily. Provider, Historical   omeprazole (PRILOSEC) 20 mg capsule Take 20 mg by mouth daily. Provider, Historical   multivitamin (ONE A DAY) tablet Take 1 Tab by mouth daily. Provider, Historical   rivaroxaban (XARELTO) 15 mg tab tablet Take  by mouth daily. Provider, Historical   potassium chloride (KLOR-CON) 20 mEq packet Take 20 mEq by mouth two (2) times a day. Provider, Historical   magnesium oxide (MAG-OX) 400 mg tablet Take 400 mg by mouth daily. Provider, Historical   levothyroxine (SYNTHROID) 25 mcg tablet Take  by mouth Daily (before breakfast). Provider, Historical   furosemide (LASIX) 40 mg tablet Take 1 Tab by mouth daily. 5/4/17   Jacque Mazariegos PA   metoprolol succinate (TOPROL-XL) 50 mg XL tablet Take 1 Tab by mouth two (2) times a day. 5/4/17   Jacque Mazariegos PA   fluticasone-vilanterol (BREO ELLIPTA) 100-25 mcg/dose inhaler Take 1 Puff by inhalation daily. Provider, Historical   amiodarone (CORDARONE) 200 mg tablet Take 1 Tab by mouth every twelve (12) hours. Patient taking differently: Take 200 mg by mouth daily. 4/14/17   JESUS Richard   docusate sodium (COLACE) 100 mg capsule Take 1 Cap by mouth two (2) times a day. 4/14/17   JESUS Richard   sodium chloride (OCEAN) 0.65 % nasal spray 1 [place] by Both Nostrils route as needed for Congestion (Keep at bedside for PRN use. ). 4/14/17   JESUS Richard   CERTAVITE SENIOR-ANTIOXIDANT 0.4-300-250 mg-mcg-mcg tab Take 1 Tab by mouth daily. 12/2/16   Maliha Overall, MD   loratadine (CLARITIN) 10 mg tablet Take 1 Tab by mouth daily. 7/11/16   Maliha Overall, MD           Review of Systems    Constitutional: NAD  Eyes:  no change in visual acuity, no photophobia  Ears, nose, mouth, throat, and face: no  Odynphagia, dysphagia, no thrush or exudate, negative for chronic sinus congestion, recurrent headaches  Respiratory: negative for SOB, hemoptysis or cough  Cardiovascular: negative for CP, palpitations, or PND  Gastrointestinal: unable to eat the food given in ER--has no teeth  Genitourinary: no urgency, frequency, or dysuria, no nocturia  Integument/breast: negative for skin rash or skin lesions  Hematologic/lymphatic: negative for known bleeding disorder  Musculoskeletal:negative for joint pain or joint tenderness  Neurological: negative for lightheadedness, syncope or presyncopal events, no seizure or CVA history  Behavioral/Psych: negative for depression or chronic anxiety,   Endocrine: negative for polydyspia, polyuria or intolerance to heat or cold  Allergic/Immunologic: negative for chronic allergic rhinitis, or known connective tissue disorder      Objective:     Patient Vitals for the past 24 hrs:   Temp Pulse Resp BP SpO2   10/22/21 1403 -- -- -- -- 95 %   10/22/21 1242 100.1 °F (37.8 °C) 84 18 (!) 142/75 95 %        No intake/output data recorded. No intake/output data recorded.     Data Review:   Recent Results (from the past 24 hour(s))   EKG, 12 LEAD, INITIAL    Collection Time: 10/22/21 12:55 PM   Result Value Ref Range    Ventricular Rate 87 BPM    Atrial Rate 147 BPM    P-R Interval 176 ms    QRS Duration 74 ms    Q-T Interval 372 ms    QTC Calculation (Bezet) 447 ms    Calculated P Axis 103 degrees    Calculated R Axis 76 degrees    Calculated T Axis 38 degrees    Diagnosis       Sinus tachycardia with Premature atrial complexes  Otherwise normal ECG  When compared with ECG of 04-APR-2017 12:30,  Previous ECG has undetermined rhythm, needs review  ST now depressed in Anterior leads  Nonspecific T wave abnormality, improved in Inferior leads  Nonspecific T wave abnormality no longer evident in Anterolateral leads  Confirmed by Juan Mueller ([id number]) on 10/22/2021 4:20:53 PM     GLUCOSE, POC    Collection Time: 10/22/21 12:59 PM   Result Value Ref Range    Glucose (POC) 143 (H) 65 - 100 mg/dL    Performed by Roberto    METABOLIC PANEL, COMPREHENSIVE    Collection Time: 10/22/21  1:01 PM   Result Value Ref Range    Sodium 141 136 - 145 mmol/L    Potassium 4.0 3.5 - 5.1 mmol/L    Chloride 104 98 - 107 mmol/L    CO2 29 21 - 32 mmol/L    Anion gap 8 7 - 16 mmol/L    Glucose 156 (H) 65 - 100 mg/dL    BUN 24 (H) 8 - 23 MG/DL    Creatinine 1.23 (H) 0.6 - 1.0 MG/DL    GFR est AA 52 (L) >60 ml/min/1.73m2    GFR est non-AA 43 (L) >60 ml/min/1.73m2    Calcium 9.5 8.3 - 10.4 MG/DL    Bilirubin, total 1.3 (H) 0.2 - 1.1 MG/DL    ALT (SGPT) 33 12 - 65 U/L    AST (SGOT) 42 (H) 15 - 37 U/L    Alk. phosphatase 66 50 - 136 U/L    Protein, total 8.2 6.3 - 8.2 g/dL    Albumin 3.2 3.2 - 4.6 g/dL    Globulin 5.0 (H) 2.3 - 3.5 g/dL    A-G Ratio 0.6 (L) 1.2 - 3.5     CBC WITH AUTOMATED DIFF    Collection Time: 10/22/21  1:01 PM   Result Value Ref Range    WBC 9.0 4.3 - 11.1 K/uL    RBC 3.94 (L) 4.05 - 5.2 M/uL    HGB 12.9 11.7 - 15.4 g/dL    HCT 39.8 35.8 - 46.3 %    .0 (H) 79.6 - 97.8 FL    MCH 32.7 26.1 - 32.9 PG    MCHC 32.4 31.4 - 35.0 g/dL    RDW 11.9 11.9 - 14.6 %    PLATELET 005 284 - 754 K/uL    MPV 11.0 9.4 - 12.3 FL    ABSOLUTE NRBC 0.00 0.0 - 0.2 K/uL    DF AUTOMATED      NEUTROPHILS 82 (H) 43 - 78 %    LYMPHOCYTES 10 (L) 13 - 44 %    MONOCYTES 8 4.0 - 12.0 %    EOSINOPHILS 0 (L) 0.5 - 7.8 %    BASOPHILS 0 0.0 - 2.0 %    IMMATURE GRANULOCYTES 0 0.0 - 5.0 %    ABS. NEUTROPHILS 7.4 1.7 - 8.2 K/UL    ABS. LYMPHOCYTES 0.9 0.5 - 4.6 K/UL    ABS. MONOCYTES 0.7 0.1 - 1.3 K/UL    ABS. EOSINOPHILS 0.0 0.0 - 0.8 K/UL    ABS. BASOPHILS 0.0 0.0 - 0.2 K/UL    ABS. IMM. GRANS. 0.0 0.0 - 0.5 K/UL   CK    Collection Time: 10/22/21  3:56 PM   Result Value Ref Range     (H) 21 - 215 U/L       Physical Exam:     General:  Alert, cooperative, very hard of hearing. Eyes:  Erythema around eyes. No obvious purulent drainage   Ears:  No deformity. Nose: Nares normal   Mouth/Throat: No teeth, dry tongue   Neck: no JVD. Back:   deferred   Lungs:   Clear to auscultation bilaterally. Heart:  Regular rate and rhythm, S1, S2 normal   Abdomen:   Soft, non-tender. Bowel sounds normal. No masses,  No organomegaly. Extremities: Trace edema to LE bilaterally. Good left radial pulse with wrist in splint. Non tender to palpation at this area. Pulses: 2+ and symmetric all extremities. Skin: Small bruise to left lateral hip   Lymph nodes: Cervical, supraclavicular, and axillary nodes normal.   Neurologic: Very hard of hearing. Limited ROM in bed. Assessment and Plan     Principal Problem:    Rhabdomyolysis (10/22/2021)    Active Problems:    Hypertension (7/1/2015)      Hyperlipidemia (7/1/2015)      Atrial fibrillation ([facility] [address] (4/4/2017)    Rhabdomyolysis - IV fluids. Hold her K supplement and lasix. Consult PT/OT. Apparently not safe to live alone, will need case management. Gentle fluids since hx of EF 30%. Will repeat ECHO    A fib - Xarelto, BB, amiodarone. Daughter admits patient having multiple falls in the past week. May need to address benefits versus risks of Xarelto in a 79 yo frail woman with dementia who now is falling. I tried to address in the room but then the daughter started talking on the phone. Do not think has wrist fracture. Non tender on exam. Certainly if worsens, can order MRI of left wrist.     Parkinson dx? - Do not see any medications for this. Hypothyroidism - Levothyroxine. Check TSH/T4    DM type II - SS. Check A1C    EF 30% - Repeat ECHO.  Appearing compensated on exam. Cardiac monitor.  Strict Is and Os    Hx of PE - Xarelto    COPD - Symbicort    PPI    Consult PT/OT    Warm compresses to eyes    Patient wishes to be FULL CODE    DVT prophylaxis - Xarelto    Signed By: Dyan Kidd DO   October 22, 2021

## 2021-10-22 NOTE — ED TRIAGE NOTES
Pt iqra by [address] office called for Welfare check. Pt Found in bathroom on floor since 2-3pm yesterday. High content hoarder. Pt lives alone. Pt states she fell. Denies LOC. C\o bilateral knee and back pain. Hx of dementia and parkinsons. Left arm is red and swollen.  Small bruise on left hip

## 2021-10-22 NOTE — ED NOTES
TRANSFER - OUT REPORT:    Verbal report given to 2nd floor RN  on [facility]  being transferred to room 223 for routine progression of care       Report consisted of patients Situation, Background, Assessment and   Recommendations(SBAR). Information from the following report(s) SBAR was reviewed with the receiving nurse. Lines:   Peripheral IV 10/22/21 Right Antecubital (Active)   Site Assessment Clean, dry, & intact 10/22/21 1253   Phlebitis Assessment 0 10/22/21 1253   Infiltration Assessment 0 10/22/21 1253   Dressing Status Clean, dry, & intact 10/22/21 1253   Dressing Type Transparent 10/22/21 1253   Hub Color/Line Status Blue 10/22/21 1253        Opportunity for questions and clarification was provided.       Patient transported with:   [employer]

## 2021-10-23 ENCOUNTER — APPOINTMENT (OUTPATIENT)
Dept: NON INVASIVE DIAGNOSTICS | Age: 86
DRG: 565 | End: 2021-10-23
Attending: FAMILY MEDICINE
Payer: COMMERCIAL

## 2021-10-23 LAB
ANION GAP SERPL CALC-SCNC: 7 MMOL/L (ref 7–16)
BASOPHILS # BLD: 0 K/UL (ref 0–0.2)
BASOPHILS NFR BLD: 0 % (ref 0–2)
BUN SERPL-MCNC: 27 MG/DL (ref 8–23)
CALCIUM SERPL-MCNC: 9 MG/DL (ref 8.3–10.4)
CHLORIDE SERPL-SCNC: 108 MMOL/L (ref 98–107)
CK SERPL-CCNC: 692 U/L (ref 21–215)
CO2 SERPL-SCNC: 29 MMOL/L (ref 21–32)
CREAT SERPL-MCNC: 1.11 MG/DL (ref 0.6–1)
DIFFERENTIAL METHOD BLD: ABNORMAL
EOSINOPHIL # BLD: 0 K/UL (ref 0–0.8)
EOSINOPHIL NFR BLD: 1 % (ref 0.5–7.8)
ERYTHROCYTE [DISTWIDTH] IN BLOOD BY AUTOMATED COUNT: 11.9 % (ref 11.9–14.6)
GLUCOSE BLD STRIP.AUTO-MCNC: 132 MG/DL (ref 65–100)
GLUCOSE BLD STRIP.AUTO-MCNC: 135 MG/DL (ref 65–100)
GLUCOSE BLD STRIP.AUTO-MCNC: 139 MG/DL (ref 65–100)
GLUCOSE BLD STRIP.AUTO-MCNC: 147 MG/DL (ref 65–100)
GLUCOSE SERPL-MCNC: 135 MG/DL (ref 65–100)
HCT VFR BLD AUTO: 36 % (ref 35.8–46.3)
HGB BLD-MCNC: 11.5 G/DL (ref 11.7–15.4)
IMM GRANULOCYTES # BLD AUTO: 0 K/UL (ref 0–0.5)
IMM GRANULOCYTES NFR BLD AUTO: 0 % (ref 0–5)
LYMPHOCYTES # BLD: 1.3 K/UL (ref 0.5–4.6)
LYMPHOCYTES NFR BLD: 16 % (ref 13–44)
MCH RBC QN AUTO: 31.8 PG (ref 26.1–32.9)
MCHC RBC AUTO-ENTMCNC: 31.9 G/DL (ref 31.4–35)
MCV RBC AUTO: 99.4 FL (ref 79.6–97.8)
MM INDURATION POC: 0 MM (ref 0–5)
MONOCYTES # BLD: 0.8 K/UL (ref 0.1–1.3)
MONOCYTES NFR BLD: 10 % (ref 4–12)
NEUTS SEG # BLD: 5.8 K/UL (ref 1.7–8.2)
NEUTS SEG NFR BLD: 73 % (ref 43–78)
NRBC # BLD: 0 K/UL (ref 0–0.2)
PLATELET # BLD AUTO: 176 K/UL (ref 150–450)
PMV BLD AUTO: 10.2 FL (ref 9.4–12.3)
POTASSIUM SERPL-SCNC: 3.6 MMOL/L (ref 3.5–5.1)
PPD POC: NEGATIVE NEGATIVE
RBC # BLD AUTO: 3.62 M/UL (ref 4.05–5.2)
SERVICE CMNT-IMP: ABNORMAL
SODIUM SERPL-SCNC: 144 MMOL/L (ref 136–145)
WBC # BLD AUTO: 8 K/UL (ref 4.3–11.1)

## 2021-10-23 PROCEDURE — 97530 THERAPEUTIC ACTIVITIES: CPT

## 2021-10-23 PROCEDURE — 82550 ASSAY OF CK (CPK): CPT

## 2021-10-23 PROCEDURE — 65270000029 HC RM PRIVATE

## 2021-10-23 PROCEDURE — 36415 COLL VENOUS BLD VENIPUNCTURE: CPT

## 2021-10-23 PROCEDURE — 92610 EVALUATE SWALLOWING FUNCTION: CPT

## 2021-10-23 PROCEDURE — 82962 GLUCOSE BLOOD TEST: CPT

## 2021-10-23 PROCEDURE — 85025 COMPLETE CBC W/AUTO DIFF WBC: CPT

## 2021-10-23 PROCEDURE — 94640 AIRWAY INHALATION TREATMENT: CPT

## 2021-10-23 PROCEDURE — 97535 SELF CARE MNGMENT TRAINING: CPT

## 2021-10-23 PROCEDURE — 74011250637 HC RX REV CODE- 250/637: Performed by: FAMILY MEDICINE

## 2021-10-23 PROCEDURE — 94664 DEMO&/EVAL PT USE INHALER: CPT

## 2021-10-23 PROCEDURE — 80048 BASIC METABOLIC PNL TOTAL CA: CPT

## 2021-10-23 PROCEDURE — 97166 OT EVAL MOD COMPLEX 45 MIN: CPT

## 2021-10-23 PROCEDURE — 94760 N-INVAS EAR/PLS OXIMETRY 1: CPT

## 2021-10-23 PROCEDURE — 2709999900 HC NON-CHARGEABLE SUPPLY

## 2021-10-23 PROCEDURE — 97161 PT EVAL LOW COMPLEX 20 MIN: CPT

## 2021-10-23 PROCEDURE — 77030040830 HC CATH URETH FOL MDII -A

## 2021-10-23 PROCEDURE — 93306 TTE W/DOPPLER COMPLETE: CPT

## 2021-10-23 RX ADMIN — DOCUSATE SODIUM 100 MG: 100 CAPSULE, LIQUID FILLED ORAL at 18:02

## 2021-10-23 RX ADMIN — LEVOTHYROXINE SODIUM 25 MCG: 0.05 TABLET ORAL at 08:44

## 2021-10-23 RX ADMIN — SODIUM CHLORIDE 10 ML: 9 INJECTION INTRAMUSCULAR; INTRAVENOUS; SUBCUTANEOUS at 21:59

## 2021-10-23 RX ADMIN — CETIRIZINE HYDROCHLORIDE 10 MG: 5 TABLET ORAL at 08:44

## 2021-10-23 RX ADMIN — SODIUM CHLORIDE 10 ML: 9 INJECTION INTRAMUSCULAR; INTRAVENOUS; SUBCUTANEOUS at 05:07

## 2021-10-23 RX ADMIN — DOCUSATE SODIUM 100 MG: 100 CAPSULE, LIQUID FILLED ORAL at 08:44

## 2021-10-23 RX ADMIN — METOPROLOL SUCCINATE 50 MG: 50 TABLET, EXTENDED RELEASE ORAL at 18:02

## 2021-10-23 RX ADMIN — BUDESONIDE AND FORMOTEROL FUMARATE DIHYDRATE 2 PUFF: 160; 4.5 AEROSOL RESPIRATORY (INHALATION) at 19:58

## 2021-10-23 RX ADMIN — Medication 400 MG: at 08:45

## 2021-10-23 RX ADMIN — AMIODARONE HYDROCHLORIDE 200 MG: 200 TABLET ORAL at 08:45

## 2021-10-23 RX ADMIN — BUDESONIDE AND FORMOTEROL FUMARATE DIHYDRATE 2 PUFF: 160; 4.5 AEROSOL RESPIRATORY (INHALATION) at 08:07

## 2021-10-23 RX ADMIN — RIVAROXABAN 15 MG: 15 TABLET, FILM COATED ORAL at 08:44

## 2021-10-23 RX ADMIN — SENNOSIDES 8.6 MG: 8.6 TABLET, FILM COATED ORAL at 08:44

## 2021-10-23 RX ADMIN — SODIUM CHLORIDE 10 ML: 9 INJECTION INTRAMUSCULAR; INTRAVENOUS; SUBCUTANEOUS at 13:56

## 2021-10-23 RX ADMIN — NYSTATIN: 100000 POWDER TOPICAL at 21:57

## 2021-10-23 RX ADMIN — ACETAMINOPHEN 650 MG: 325 TABLET ORAL at 13:56

## 2021-10-23 RX ADMIN — NYSTATIN: 100000 POWDER TOPICAL at 08:50

## 2021-10-23 RX ADMIN — PANTOPRAZOLE SODIUM 40 MG: 40 TABLET, DELAYED RELEASE ORAL at 08:44

## 2021-10-23 RX ADMIN — CYANOCOBALAMIN TAB 1000 MCG 1000 MCG: 1000 TAB at 08:44

## 2021-10-23 RX ADMIN — METOPROLOL SUCCINATE 50 MG: 50 TABLET, EXTENDED RELEASE ORAL at 08:45

## 2021-10-23 NOTE — PROGRESS NOTES
END OF SHIFT NOTE:    INTAKE/OUTPUT  10/22 0701 - 10/23 0700  In: 0   Out: 250 [Urine:250]  Voiding: YES  Catheter: YES  Drain:              Flatus: Patient does have flatus present. Stool:  1 occurrences. Characteristics:  Stool Assessment  Stool Color: Brown  Stool Appearance: Loose, Soft  Stool Amount: Medium  Stool Source/Status: Rectum    Emesis: 0 occurrences. Characteristics:        VITAL SIGNS  Patient Vitals for the past 12 hrs:   Temp Pulse Resp BP SpO2   10/23/21 1802 -- 68 -- (!) 141/54 --   10/23/21 1541 99.8 °F (37.7 °C) 70 22 (!) 92/53 93 %   10/23/21 1221 97.8 °F (36.6 °C) 73 18 106/69 93 %   10/23/21 1136 -- -- -- 135/61 --   10/23/21 0845 -- 70 -- 135/61 --   10/23/21 0808 -- -- -- -- 92 %       Pain Assessment  Pain Intensity 1: 1 (10/23/21 1305)  Pain Location 1: Back  Pain Intervention(s) 1: Back rub, Ambulation/Increased Activity, Distraction, Emotional support, Family Support, Nurse notified, Repositioned, Rest, Therapeutic presence, Therapeutic touch, Other (comment) (changed brief)  Patient Stated Pain Goal: 0    Ambulating  Yes (with OT, up to chair)    Shift report given to oncoming nurse at the bedside.     Eren Diaz RN

## 2021-10-23 NOTE — ED PROVIDER NOTES
History is of fall in the middle of the yesterday afternoon. Lives alone and does not seem self capable with 's office doing home situation check. Daughter here but does not live with her/ report to staff from EMS is that patient is a horder and her home quite a mess. The history is provided by the patient and a relative. Fall  The accident occurred 12 to 24 hours ago (middle of night). The fall occurred while standing. She fell from a height of ground level. She landed on hard floor. The pain is moderate. There was no drug use involved in the accident. There was no alcohol use involved in the accident. Pertinent negatives include no fever, no numbness, no nausea, no vomiting, no extremity weakness, no loss of consciousness, no tingling and no laceration. The risk factors include dementia and being elderly.          Past Medical History:   Diagnosis Date    Asthma 7/1/2015    Heartburn 7/1/2015    Hyperlipidemia 7/1/2015    Hypertension 7/1/2015    Type 2 diabetes mellitus (HCC)     Urge incontinence 7/1/2015       Past Surgical History:   Procedure Laterality Date    HX CATARACT REMOVAL  2011         Family History:   Problem Relation Age of Onset    Diabetes Father     Diabetes Other     Cancer Other     Breast Cancer Child         2 daughters had breast cancer    Breast Cancer Sister        Social History     Socioeconomic History    Marital status: SINGLE     Spouse name: Not on file    Number of children: Not on file    Years of education: Not on file    Highest education level: Not on file   Occupational History    Not on file   Tobacco Use    Smoking status: Never Smoker    Smokeless tobacco: Never Used   Substance and Sexual Activity    Alcohol use: No    Drug use: No    Sexual activity: Not on file   Other Topics Concern    Not on file   Social History Narrative    Not on file     Social Determinants of Health     Financial Resource Strain:     Difficulty of Paying Living Expenses:    Food Insecurity:     Worried About Running Out of Food in the Last Year:     [address] in the Last Year:    Transportation Needs:     Lack of Transportation (Medical):  Lack of Transportation (Non-Medical):    Physical Activity:     Days of Exercise per Week:     Minutes of Exercise per Session:    Stress:     Feeling of Stress :    Social Connections:     Frequency of Communication with Friends and Family:     Frequency of Social Gatherings with Friends and Family:     Attends Restorationist Services:     Active Member of Clubs or Organizations:     Attends Club or Organization Meetings:     Marital Status:    Intimate Partner Violence:     Fear of Current or Ex-Partner:     Emotionally Abused:     Physically Abused:     Sexually Abused: ALLERGIES: Patient has no known allergies. Review of Systems   Constitutional: Negative for chills and fever. Respiratory: Negative. Cardiovascular: Negative. Gastrointestinal: Negative for nausea and vomiting. Genitourinary: Negative. Musculoskeletal: Negative. Negative for extremity weakness. Neurological: Negative for tingling, loss of consciousness and numbness. Psychiatric/Behavioral: Negative for decreased concentration. All other systems reviewed and are negative. Vitals:    10/22/21 1822 10/22/21 1826 10/22/21 2048 10/22/21 2342   BP:  134/74 130/76 128/60   Pulse: 93 97 98 69   Resp:   18 18   Temp:   99 °F (37.2 °C) 98.6 °F (37 °C)   SpO2: 100%  98% 94%   Weight:       Height:                Physical Exam  Vitals and nursing note reviewed. Constitutional:       General: She is not in acute distress. Appearance: She is well-developed and normal weight. She is not ill-appearing or diaphoretic. HENT:      Head: Atraumatic.       Right Ear: External ear normal.      Left Ear: External ear normal.      Mouth/Throat:      Mouth: Mucous membranes are moist.   Eyes:      General: No scleral icterus. Cardiovascular:      Rate and Rhythm: Normal rate. Pulmonary:      Effort: Pulmonary effort is normal. No respiratory distress. Abdominal:      General: Abdomen is flat. Palpations: Abdomen is soft. Tenderness: There is no abdominal tenderness. There is no right CVA tenderness, left CVA tenderness, guarding or rebound. Musculoskeletal:         General: Tenderness present. Cervical back: Neck supple. Right lower leg: No edema. Left lower leg: No edema. Comments: Puffy left dorsal wrist  Mild soreness left hip region but FROM and no shortening. No other changes   Skin:     General: Skin is warm and dry. Findings: No laceration. Neurological:      Mental Status: She is alert. Mental status is at baseline. Psychiatric:         Thought Content: Thought content normal.          MDM  Number of Diagnoses or Management Options  Living alone  Traumatic rhabdomyolysis, initial encounter [facility])  Diagnosis management comments: All and in floor for quite some time. No capable family support (daughter here states she has \"some dementia too\"). EMS reports unsafe living situation concerns. Will need hydration and ongoing social evaluation.        Amount and/or Complexity of Data Reviewed  Clinical lab tests: ordered and reviewed  Tests in the radiology section of CPT®: reviewed and ordered  Tests in the medicine section of CPT®: (===============================================  ED EKG Interpretation  EKG was interpreted in the absence of a cardiologist.    EKG rhythm: normal sinus rhythm  Rate: 87  ST Segments: some PACs      Harleen Larios MD; 10/26/2021 @9:55 PM================    )  Obtain history from someone other than the patient: yes  Review and summarize past medical records: yes  Discuss the patient with other providers: yes  Independent visualization of images, tracings, or specimens: yes    Risk of Complications, Morbidity, and/or Mortality  Presenting problems: moderate  Diagnostic procedures: moderate  Management options: moderate           Procedures

## 2021-10-23 NOTE — PROGRESS NOTES
PT Note:  PT orders received and chart review initiated. Attempted PT evaluation, however, patient undergoing echo. Will attempt another time/day as schedule permits.   A Nataly Dhaliwal, PT, DPT

## 2021-10-23 NOTE — ROUTINE PROCESS
TRANSFER - IN REPORT:    Verbal report received from Ella HENSLEY on [employer] being received from [phone] for routine progression of care. Report consisted of patients Situation, Background, Assessment and Recommendations(SBAR). Information from the following report(s) SBAR, Kardex, ED Summary, Intake/Output and Recent Results was reviewed. Opportunity for questions and clarification was provided. Assessment completed upon patients arrival to unit and care assumed. Patient received to room 223. Patient connected to monitor and assessment completed. Plan of care reviewed. Patient oriented to room and call light. Patient aware to use call light to communicate any chest pain or needs. Admission skin assessment completed with second RN and reveals the following: Tender glossy yeast to abd folds. Nystatin Powder requested. Blanchable redness to bottom. Allevyn applied. No s/sx of pressure breakdown including heels, bottom and bony prominences.

## 2021-10-23 NOTE — PROGRESS NOTES
Reviewed notes for concerns      Per notes:      HPI:   Patient is a 80 y.o. female who presented to the ED after being found on floor for past day. Last seen two days ago. [facility] office was called for welfare check. Denies syncope or dizziness. She states she just fell and could not get back up. Daughter in room states mother has had falls in the past week. Hx of dementia, parkinson dx but I do not see any medications she takes for this, HTN, HLD, GERD, hypothyroidism, pulmonary embolus in 2017, EF 30% in 2017, a fib on xarelto, CKD stage 3, and DM type II. Will continue to assess how to best serve this family      DET.  SCORE 50

## 2021-10-23 NOTE — PROGRESS NOTES
ACUTE OT GOALS:  (Developed with and agreed upon by patient and/or caregiver.)  1. Patient will complete full body bathing and dressing with min A, additional time and adaptive equipment as needed. 2. Patient will complete toileting with min A.   3. Patient will tolerate 23 minutes of OT treatment with less than 3 rest breaks to increase activity tolerance for ADLs. 4. Patient will complete functional transfers with CGA and adaptive equipment as needed.      Timeframe: 7 visits       OCCUPATIONAL THERAPY ASSESSMENT: Initial Assessment, Daily Note and PM   OT Treatment Day # 1    Patricia Green is a 80 y.o. female   PRIMARY DIAGNOSIS: Rhabdomyolysis  Rhabdomyolysis [M62.82]       Reason for Referral:  S/p fall  ICD-10: Treatment Diagnosis: Generalized Muscle Weakness (M62.81)  Other lack of cordination (R27.8)  Difficulty in walking, Not elsewhere classified (R26.2)  Other abnormalities of gait and mobility (R26.89)  Repeated Falls (R29.6)  History of falling (Z91.81)  INPATIENT: [place] / [place] / Product Type: Medicare /   ASSESSMENT:     REHAB RECOMMENDATIONS:   Recommendation to date pending progress:  Setting:   Short-term Rehab   with possible LTC  Equipment:    To Be Determined   based on progress     PRIOR LEVEL OF FUNCTION:  (Prior to Hospitalization)  INITIAL/CURRENT LEVEL OF FUNCTION:  (Based on today's evaluation)   Bathing:   Modified Independent sponge bath only  Dressing:   Modified Independent  Feeding/Grooming:   Modified Independent edentulous  Toileting:   Modified Independent wears brief  Functional Mobility:   Modified Independent with rollator Bathing:   Maximal Assistance  Dressing:   Maximal Assistance  Feeding/Grooming:   Standby Assistance  Toileting:   Maximal Assistance accident in brief  Functional Mobility:   Moderate Assistance x 2 with RW     ASSESSMENT:  Ms. Alphonso Gomes is a 81 YO R dominant WF admitted s/p fall in her bathroom and was down for several hours. Xray negative for fracture but pt has wrist cockup in the room, not wearing it. Pt very [place] and dtr and son answered most questions. Pt oriented x3, not date. Pt reports her low back and side hurts since fall. Decreased mobility since then. Pt gto up from bed this afternoon with mod A and additional time, sit to stand min A, mod A with RW ambulated to toilet today, very unsteady and shaking all over body, high falls risk, sat to toilet but had loose stool in brief. Max A overall toileting and donning new brief. Pt got all over her R hand wiping from the front. Stood at sink for handwashing and managed faucet, refused to use soap. Sat to recliner at end of session with alarm in place and 3 family members at bedside. Pt is functioning below her baseline and could benefit from skilled OT to maximize her independence and safety in ADLs and functional mobility. Pt is not safe to be home alone and family reports they can not provide 24/7 care. SUBJECTIVE:   Ms. Alphonso Gomes states, \"I need to use the bathroom, but I think I had an accident. \"    SOCIAL HISTORY/LIVING ENVIRONMENT: lives alone in [place] home with 5 steps, L HR at entrance, has T/S but only sponge bathes, uses rollator all the time, holds to dtr and uses [place] getting from house to car when they go out, gets MOWs and reheats meals, daughter stays with pt during the day but leaves her at times as well and pt is alone at night. Sleeps in recliner. Wears pull up briefs all the time. No longer driving. Pt manages all her money and business affairs per son. A with IADLs when she lets her daughter help. Very [place]. 2 recent falls.    Home Environment: Private residence  # Steps to Enter: 5  One/Two Story Residence: One story  Living Alone: Yes  Support Systems: Child(chase)    OBJECTIVE:     PAIN: VITAL SIGNS: LINES/DRAINS:   Pre Treatment: Pain Screen  Pain Scale 1: Numeric (0 - 10)  Pain Intensity 1: 1  Pain Onset 1: post fall  Pain Location 1: Back  Pain Description 1: Aching  Pain Intervention(s) 1: Back rub; Ambulation/Increased Activity; Distraction; Emotional support; Family Support;Nurse notified;Repositioned;Rest;Therapeutic presence; Therapeutic touch; Other (comment) (changed brief)  Patient Stated Pain Goal: 0  Post Treatment: 4/10 after ambulation and toileting, RN alerted   adult brief  O2 Device: None (Room air)     GROSS EVALUATION:  B UEs, R dom Within Functional Limits Abnormal/ Functional Abnormal/ Non-Functional (see comments) Not Tested Comments:   AROM [] [x] [] [] B UEs limited at shoulders, L worse than R, can't make full fists   PROM [] [] [] [x]    Strength [] [x] [] [] Weak [place]   Balance [] [x] [] [] unsteady and tremors all over, dtr reports pt has Parkinsons but takes no meds   Posture [] [x] [] [] Forward head, rounded shoulders   Sensation [x] [] [] []    Coordination [] [x] [] [] Swollen fingers/joints   Tone [] [] [] [x]    Edema [] [x] [] [] swelling in B UEs   Activity Tolerance [] [x] [] [] Needs rest breaks    [] [] [] []      COGNITION/  PERCEPTION: Intact Impaired   (see comments) Comments:   Orientation [] [x] Not date   Vision [] [x]    Hearing [] [x] Very [place]   Judgment/ Insight [] [x]    Attention [x] []    Memory [x] []    Command Following [x] []    Emotional Regulation [x] []     [] []      ACTIVITIES OF DAILY LIVING: I Mod I S SBA CGA Min Mod Max Total NT Comments   BASIC ADLs:              Bathing/ Showering [] [] [] [] [] [] [] [x] [] []    Toileting [] [] [] [] [] [] [] [x] [] []    Dressing [] [] [] [] [] [] [] [x] [] []    Feeding [] [] [] [x] [] [] [] [] [] []    Grooming [] [] [] [] [] [] [] [x] [] []    Personal Device Care [] [] [] [] [] [] [] [] [] []    Functional Mobility [] [] [] [] [] [] [x] [] [] [] x2 with RW   I=Independent, Mod I=Modified Independent, S=Supervision, SBA=Standby Assistance, CGA=Contact Guard Assistance,   Min=Minimal Assistance, Mod=Moderate Assistance, Max=Maximal Assistance, Total=Total Assistance, NT=Not Tested    MOBILITY: I Mod I S SBA CGA Min Mod Max Total  NT x2 Comments:   Supine to sit [] [] [] [] [] [] [x] [] [] [] []    Sit to supine [] [] [] [] [] [] [] [] [] [x] [] Up to recliner   Sit to stand [] [] [] [] [] [x] [x] [] [] [] [x]    Bed to chair [] [] [] [] [] [] [x] [] [] [] [x]    I=Independent, Mod I=Modified Independent, S=Supervision, SBA=Standby Assistance, CGA=Contact Guard Assistance,   Min=Minimal Assistance, Mod=Moderate Assistance, Max=Maximal Assistance, Total=Total Assistance, NT=Not [facility] 6 Clicks   Daily Activity Inpatient Short Form        How much help from another person does the patient currently need. .. Total A Lot A Little None   1. Putting on and taking off regular lower body clothing? [] 1   [x] 2   [] 3   [] 4   2. Bathing (including washing, rinsing, drying)? [] 1   [x] 2   [] 3   [] 4   3. Toileting, which includes using toilet, bedpan or urinal?   [] 1   [x] 2   [] 3   [] 4   4. Putting on and taking off regular upper body clothing? [] 1   [x] 2   [] 3   [] 4   5. Taking care of personal grooming such as brushing teeth? [] 1   [] 2   [x] 3   [] 4   6. Eating meals? [] 1   [] 2   [x] 3   [] 4   © 2007, Trustees of Owned it, under license to [employer]. All rights reserved     Score:  Initial: 14, completed 10/23/2021 Most Recent: X (Date: -- )   Interpretation of Tool:  Represents activities that are increasingly more difficult (i.e. Bed mobility, Transfers, Gait). PLAN:   FREQUENCY/DURATION: OT Plan of Care: 3 times/week for duration of hospital stay or until stated goals are met, whichever comes first.    PROBLEM LIST:   (Skilled intervention is medically necessary to address:)  1. Decreased ADL/Functional Activities  2. Decreased Activity Tolerance  3. Decreased AROM/PROM  4. Decreased Balance  5. Decreased Cognition  6. Decreased Coordination  7. Decreased Gait Ability  8.  Decreased Strength  9. Decreased Transfer Abilities  10. Increased Pain   INTERVENTIONS PLANNED:   (Benefits and precautions of occupational therapy have been discussed with the patient.)  1. Self Care Training  2. Therapeutic Activity  3. Therapeutic Exercise/HEP  4. Neuromuscular Re-education  5. Education     TREATMENT:     EVALUATION: Moderate Complexity : (Untimed Charge)    TREATMENT:   ($$ Self Care/Home Management: 23-37 mins    )  Co-Treatment PT/OT necessary due to patient's decreased overall endurance/tolerance levels, as well as need for high level skilled assistance to complete functional transfers/mobility and functional tasks  Self Care (30 Minutes): Self care including Upper Body Bathing, Lower Body Bathing, Toileting, Upper Body Dressing, Lower Body Dressing, Self Feeding, Grooming and ADL Adaptive Equipment Training to increase independence and decrease level of assistance required.     TREATMENT GRID:  N/A    AFTER TREATMENT POSITION/PRECAUTIONS:  Alarm Activated, Chair, Needs within reach, RN notified and Visitors at bedside    INTERDISCIPLINARY COLLABORATION:  RN/PCT, PT/PTA, OT/KESSLER and RN Case Manager/     TOTAL TREATMENT DURATION:  43 mins  OT Patient Time In/Time Out  Time In: 1305  Time Out: 103 Verito Minor, OT   Mitch Quintero, MS, OTR/L

## 2021-10-23 NOTE — PROGRESS NOTES
Problem: Dysphagia (Adult)  Goal: *Acute Goals and Plan of Care (Insert Text)  Note: ST. Pt. Will tolerate soft/bite size diet with thin liquids with no overt s/sx of aspiration/penetration. 2. Pt. Will participate in speech/language/cognitive evaluation with 100% participation. LTG: Pt. Will tolerate least restrictive diet without respiratory decline. SPEECH LANGUAGE PATHOLOGY: DYSPHAGIA- Initial Assessment    NAME/AGE/GENDER: Edilma Garcia is a 80 y.o. female  DATE: 10/23/2021  PRIMARY DIAGNOSIS: Rhabdomyolysis [M62.82]       ICD-10: Treatment Diagnosis: R13.12 Dysphagia, Oropharyngeal Phase    RECOMMENDATIONS   DIET:   Soft and Bite-Sized  Thin Liquids    MEDICATIONS: With liquid  Crushed in puree  As tolerated     PRECAUTIONS, MODIFICATIONS, AND STRATEGIES  Slow rate of intake  Small bites/sips  Upright at 90 degrees during meal  Alternate liquids/solids  Small sips and bites     RECOMMENDATIONS FOR CONTINUED SPEECH THERAPY:   YES: Anticipate need for ongoing speech therapy during this hospitalization and at next level of care. ASSESSMENT   Patient presents with oropharyngeal dysphagia characterized by decreased mastication quality. Per family, cognitive linguistic changes s/p 24+ hours on the floor - will allow medical management and provide cognitive linguistic evaluation as indicated. Family appears in agreement that he requires additional supervision. Recommend soft, bite size diet with thin liquids - fully upright with PO intake. Follow for possible need for cognitive linguistic evaluation/treatment. EDUCATION:  Recommendations discussed with Patient, Family, and RN    REHABILITATION POTENTIAL FOR STATED GOALS: Good    PLAN    FREQUENCY/DURATION:   Continue to follow patient 3 times a week for duration of hospital stay to address above goals.  Recommendations for next treatment session: Next treatment session will address diet tolerance and po trials  Possible cognitive-linguistic assessment pending clinical course and imaging results    CONTINUATION OF SKILLED SERVICES/MEDICAL NECESSITY:  Patient is expected to demonstrate progress in  swallow strength, swallow timeliness, swallow function, diet tolerance, and swallow safety in order to  improve swallow safety, work toward diet advancement, and decrease aspiration risk. SUBJECTIVE   Drowsy, family at bedside      Pain: Pain Scale 1: FLACC  Pain Intensity 1: 0    History of Present Injury/Illness: Ms. Justine Morales  has a past medical history of Asthma (7/1/2015), Heartburn (7/1/2015), Hyperlipidemia (7/1/2015), Hypertension (7/1/2015), Type 2 diabetes mellitus ([facility] [address] and Urge incontinence (7/1/2015). . She also  has a past surgical history that includes hx cataract removal (2011). PRECAUTIONS/ALLERGIES: Patient has no known allergies. Problem List:  (Impairments causing functional limitations):  Dysphagia     Previous Dysphagia: NONE REPORTED  Diet Prior to Evaluation: NPO awaiting speech    Orientation:  Person    Cognitive-Linguistic Screening:  Alertness  Drowsy, but arousable with minimal cues  Speech Production:   Some dysarthria, but able to understand  Expressive Language:  Minimal output  Receptive Language:  Needs repetition and/or demonstration for command following  Cognition:   Per family below baseline  Prior Level of Function: at home independently with family support intermittently  Recommendations: Given results of screening, further evaluation is indicated to assess cognitive linguistic function if deficits persist post medical management. OBJECTIVE   Oral Motor:   Labial: Decreased seal  Dentition:  single tooth  Oral Hygiene: Dry  Lingual: Decreased rate, Decreased strength, and Impaired coordination    Dysphagia evaluation:   Patient consumed trials of ice chip, thin liquids via tsp/cup/straw sip, puree, mixed consistency fruit and cracker. Increased mastication time with solids.  Mild oral residue cleared with liquid wash. Overt s/x were present upon SLP entering room with patient drinking coffee via straw while lying nearly flat. Family education regarding importance of positioning and increased risk for aspiration. Family verbalized understanding. Recommend soft, bite size diet with thin liquids. Medications as tolerated. Tool Used: Dysphagia Outcome and Severity Scale (CHELA)    Score Comments   Normal Diet  [] 7 With no strategies or extra time needed   Functional Swallow  [] 6 May have mild oral or pharyngeal delay   Mild Dysphagia  [] 5 Which may require one diet consistency restricted    Mild-Moderate Dysphagia  [x] 4 With 1-2 diet consistencies restricted   Moderate Dysphagia  [] 3 With 2 or more diet consistencies restricted   Moderate-Severe Dysphagia  [] 2 With partial PO strategies (trials with ST only)   Severe Dysphagia  [] 1 With inability to tolerate any PO safely      Score:  Initial: 4 Most Recent: x (Date 10/23/21 )   Interpretation of Tool: The Dysphagia Outcome and Severity Scale (CHELA) is a simple, easy-to-use, 7-point scale developed to systematically rate the functional severity of dysphagia based on objective assessment and make recommendations for diet level, independence level, and type of nutrition. Current Medications:   No current facility-administered medications on file prior to encounter. Current Outpatient Medications on File Prior to Encounter   Medication Sig Dispense Refill    acetaminophen (TYLENOL) 650 mg CR tablet Take 650 mg by mouth every six (6) hours as needed for Pain. Cholecalciferol, Vitamin D3, 50,000 unit cap Take  by mouth. senna (SENNA) 8.6 mg tablet Take 1 Tab by mouth daily. cyanocobalamin (VITAMIN B-12) 1,000 mcg tablet Take 1,000 mcg by mouth daily. omeprazole (PRILOSEC) 20 mg capsule Take 20 mg by mouth daily. multivitamin (ONE A DAY) tablet Take 1 Tab by mouth daily.       rivaroxaban (XARELTO) 15 mg tab tablet Take by mouth daily. potassium chloride (KLOR-CON) 20 mEq packet Take 20 mEq by mouth two (2) times a day. magnesium oxide (MAG-OX) 400 mg tablet Take 400 mg by mouth daily. levothyroxine (SYNTHROID) 25 mcg tablet Take  by mouth Daily (before breakfast). furosemide (LASIX) 40 mg tablet Take 1 Tab by mouth daily. 30 Tab 6    metoprolol succinate (TOPROL-XL) 50 mg XL tablet Take 1 Tab by mouth two (2) times a day. 60 Tab 6    fluticasone-vilanterol (BREO ELLIPTA) 100-25 mcg/dose inhaler Take 1 Puff by inhalation daily. amiodarone (CORDARONE) 200 mg tablet Take 1 Tab by mouth every twelve (12) hours. (Patient taking differently: Take 200 mg by mouth daily.) 60 Tab 11    docusate sodium (COLACE) 100 mg capsule Take 1 Cap by mouth two (2) times a day. 60 Cap 11    sodium chloride (OCEAN) 0.65 % nasal spray 1 [place] by Both Nostrils route as needed for Congestion (Keep at bedside for PRN use. ). 15 mL 11    CERTAVITE SENIOR-ANTIOXIDANT 0.4-300-250 mg-mcg-mcg tab Take 1 Tab by mouth daily. 90 Tab 3    loratadine (CLARITIN) 10 mg tablet Take 1 Tab by mouth daily. 90 Tab 3        INTERDISCIPLINARY COLLABORATION: RN    After treatment position/precautions:  Upright in bed  RN notified    Total Treatment Duration:   Time In: 1130  Time Out: [address] [facility] [address].  Snehal Savage MS, CCC-SLP         Speech Language Pathologist          Acute Rehabilitation Services                   Contact: Rick

## 2021-10-23 NOTE — PROGRESS NOTES
Problem: Falls - Risk of  Goal: *Absence of Falls  Description: Document Jennagabi Sethi Fall Risk and appropriate interventions in the flowsheet. Outcome: Progressing Towards Goal  Note: Fall Risk Interventions:       Mentation Interventions: Door open when patient unattended, Evaluate medications/consider consulting pharmacy, Bed/chair exit alarm    Medication Interventions: Bed/chair exit alarm, Evaluate medications/consider consulting pharmacy, Patient to call before getting OOB, Teach patient to arise slowly    Elimination Interventions: Bed/chair exit alarm, Call light in reach    History of Falls Interventions: Bed/chair exit alarm, Consult care management for discharge planning, Door open when patient unattended, Evaluate medications/consider consulting pharmacy, Investigate reason for fall, Room close to nurse's station         Problem: Patient Education: Go to Patient Education Activity  Goal: Patient/Family Education  Outcome: Progressing Towards Goal     Problem: Pressure Injury - Risk of  Goal: *Prevention of pressure injury  Description: Document Sergei Scale and appropriate interventions in the flowsheet.   Outcome: Progressing Towards Goal  Note: Pressure Injury Interventions:  Sensory Interventions: Assess changes in LOC, Keep linens dry and wrinkle-free, Maintain/enhance activity level    Moisture Interventions: Absorbent underpads, Apply protective barrier, creams and emollients, Internal/External urinary devices, Moisture barrier    Activity Interventions: Increase time out of bed, Pressure redistribution bed/mattress(bed type), PT/OT evaluation    Mobility Interventions: HOB 30 degrees or less, Pressure redistribution bed/mattress (bed type), PT/OT evaluation    Nutrition Interventions: Document food/fluid/supplement intake                     Problem: Patient Education: Go to Patient Education Activity  Goal: Patient/Family Education  Outcome: Progressing Towards Goal     Problem: Non-Violent Restraints  Goal: Removal from restraints as soon as assessed to be safe  Outcome: Progressing Towards Goal  Goal: No harm/injury to patient while restraints in use  Outcome: Progressing Towards Goal  Goal: Patient's dignity will be maintained  Outcome: Progressing Towards Goal  Goal: Patient Interventions  Outcome: Progressing Towards Goal     Problem: Patient Education: Go to Patient Education Activity  Goal: Patient/Family Education  Outcome: Progressing Towards Goal     Problem: Patient Education: Go to Patient Education Activity  Goal: Patient/Family Education  Outcome: Progressing Towards Goal     Problem: Patient Education: Go to Patient Education Activity  Goal: Patient/Family Education  Outcome: Progressing Towards Goal

## 2021-10-23 NOTE — PROGRESS NOTES
Hospitalist Progress Note   Admit Date:  10/22/2021 12:31 PM   Name:  Leydi Boogie   Age:  80 y.o. Sex:  female  :  1926   MRN:  [id number]   Room:      Presenting Complaint: Fall    Reason(s) for Admission: Rhabdomyolysis [facility] [place] Course & Interval History:   Patient is a 79 y/o female with PMH HTN, HLD, GERD, hypothyroidism, PE (), sCHF (EF 30% ), Atrial Fibrillation (on Xarelto), CKD III, DMII, dementia, possible Parkinsons who presented to ED after being found on floor past day after 's were called to perform welfare check. Patient states she fell and was unable to get back up. Daughter indicates multiple falls past week. In ED, patient was febrile to 100.1 with CK of 831. Left Wrist XR with bony fragment projecting off the dorsal margin of the proximal carpal row. Findings could represent degenerative change, though a triquetral fracture could have this appearance as well. Left Hip XR with no acute fracture or dislocation. Subjective (10/23/21): Patient [place] but oriented x 4. Denies chest pain, SOB, abdominal pain. Endorses pain in back from sitting and left hip pain when walking.     Assessment & Plan:     Principal Problem:  Rhabdomyolysis  -Received gentle IVF due to history of sCHF  -Hold K supplement and Lasix  - --> 692    Active Problems:  Hypertension   -Normotensive 135/61, continue BB    Atrial fibrillation (HCC)   -On Xarelto, BB, Amiodarone  -Need to discuss risk vs. Benefit of [address] in elderly patient with dementia and hx of recent multiple falls  -Continuous telemetry    Hypothyroidism  -TSH wnl, T4 1.5, continue Synthroid    Hx of PE (2017)  -On Xarelto  -Stable on RA    Dementia  -Noted, re-orient as needed, is oriented x 4 on rounding, encourage good sleep-wake cycle, minimize nighttime interruptions, promote good sleep    Diabetes Mellitus II  -A1C 6.4 (10/22)  -SSI while inpatient    Chronic Systolic Heart Failure  -EF 30% 2017  -Lasix and K supplement held  -Repeat Echo ordered for further eval    Concern for Dysphagia  -Passed bedside swallow with nurse, SLP consult pending, continue Dysphagia minced and moist diet until further SLP recs    History of Recent Falls  -PT/OT/PPD  -CM consulted - not safe to live alone    Dispo/Discharge Planning:  Pending clinical course    Diet:  ADULT DIET Dysphagia - Minced & Moist; 4 carb choices (60 gm/meal); Low Fat/Low Chol/High Fiber/2 gm Na  DVT PPx: Xarelto  Code status: Full Code    Hospital Problems as of 10/23/2021 Date Reviewed: 6/1/2017        Codes Class Noted - Resolved POA    * (Principal) Rhabdomyolysis ICD-10-CM: J79.36  ICD-9-CM: 728.88  10/22/2021 - Present Unknown        Atrial fibrillation ([facility] [address] ICD-10-CM: I48.91  ICD-9-CM: 427.31  4/4/2017 - Present Yes        Hypertension ICD-10-CM: I10  ICD-9-CM: 401.9  7/1/2015 - Present Yes        Hyperlipidemia ICD-10-CM: E78.5  ICD-9-CM: 272.4  7/1/2015 - Present Yes              Objective:     Patient Vitals for the past 24 hrs:   Temp Pulse Resp BP SpO2   10/23/21 0845 -- 70 -- 135/61 --   10/23/21 0808 -- -- -- -- 92 %   10/23/21 0714 98.2 °F (36.8 °C) 72 19 133/67 95 %   10/23/21 0447 98.1 °F (36.7 °C) 71 18 138/76 96 %   10/22/21 2342 98.6 °F (37 °C) 69 18 128/60 94 %   10/22/21 2048 99 °F (37.2 °C) 98 18 130/76 98 %   10/22/21 1826 -- 97 -- 134/74 --   10/22/21 1822 -- 93 -- -- 100 %   10/22/21 1557 -- 77 -- (!) 164/74 94 %   10/22/21 1403 -- -- -- -- 95 %   10/22/21 1242 100.1 °F (37.8 °C) 84 18 (!) 142/75 95 %     Oxygen Therapy  O2 Sat (%): 92 % (10/23/21 0808)  Pulse via Oximetry: 75 beats per minute (10/23/21 0808)  O2 Device: None (Room air) (10/23/21 0808)    Estimated body mass index is 32.01 kg/m² as calculated from the following:    Height as of this encounter: 5' 2\" (1.575 m). Weight as of this encounter: 79.4 kg (175 lb).     Intake/Output Summary (Last 24 hours) at 10/23/2021 1047  Last data filed at 10/23/2021 0900  Gross per 24 hour   Intake 0 ml   Output 250 ml   Net -250 ml         Physical Exam:   Blood pressure 135/61, pulse 70, temperature 98.2 °F (36.8 °C), resp. rate 19, height 5' 2\" (1.575 m), weight 79.4 kg (175 lb), SpO2 92 %. General:    Frail, no acute distress, [place]  Head:  Normocephalic, atraumatic  Eyes:  Sclerae appear normal.  Pupils equally round. ENT:  Nares appear normal, no drainage. Moist oral mucosa. Missing teeth. Neck:  No restricted ROM. Trachea midline   CV:   Irregular rate and rhythm. No m/r/g. No JVD. Lungs:   CTAB. No wheezing, rhonchi. Respirations even, unlabored  Abdomen: Bowel sounds present. Soft, nontender, nondistended. Extremities: No cyanosis or clubbing. No edema  Skin:     No rashes and normal coloration. Warm and dry. Neuro:  CN II-XII grossly intact. Sensation intact. A&Ox4  Psych:  Normal mood and affect.       I have reviewed ordered lab tests and independently visualized imaging below:    Recent Labs:  Recent Results (from the past 48 hour(s))   EKG, 12 LEAD, INITIAL    Collection Time: 10/22/21 12:55 PM   Result Value Ref Range    Ventricular Rate 87 BPM    Atrial Rate 147 BPM    P-R Interval 176 ms    QRS Duration 74 ms    Q-T Interval 372 ms    QTC Calculation (Bezet) 447 ms    Calculated P Axis 103 degrees    Calculated R Axis 76 degrees    Calculated T Axis 38 degrees    Diagnosis       Sinus tachycardia with Premature atrial complexes  Otherwise normal ECG  When compared with ECG of 04-APR-2017 12:30,  Previous ECG has undetermined rhythm, needs review  ST now depressed in Anterior leads  Nonspecific T wave abnormality, improved in Inferior leads  Nonspecific T wave abnormality no longer evident in Anterolateral leads  Confirmed by Miles Lee ([id number]) on 10/22/2021 4:20:53 PM     GLUCOSE, POC    Collection Time: 10/22/21 12:59 PM   Result Value Ref Range    Glucose (POC) 143 (H) 65 - 100 mg/dL    Performed by [facility]    [facility] 3RD GENERATION Collection Time: 10/22/21  1:00 PM   Result Value Ref Range    TSH 1.060 0.358 - 3.740 uIU/mL   T4, FREE    Collection Time: 10/22/21  1:00 PM   Result Value Ref Range    T4, Free 1.5 (H) 0.78 - 8.62 NG/DL   METABOLIC PANEL, COMPREHENSIVE    Collection Time: 10/22/21  1:01 PM   Result Value Ref Range    Sodium 141 136 - 145 mmol/L    Potassium 4.0 3.5 - 5.1 mmol/L    Chloride 104 98 - 107 mmol/L    CO2 29 21 - 32 mmol/L    Anion gap 8 7 - 16 mmol/L    Glucose 156 (H) 65 - 100 mg/dL    BUN 24 (H) 8 - 23 MG/DL    Creatinine 1.23 (H) 0.6 - 1.0 MG/DL    GFR est AA 52 (L) >60 ml/min/1.73m2    GFR est non-AA 43 (L) >60 ml/min/1.73m2    Calcium 9.5 8.3 - 10.4 MG/DL    Bilirubin, total 1.3 (H) 0.2 - 1.1 MG/DL    ALT (SGPT) 33 12 - 65 U/L    AST (SGOT) 42 (H) 15 - 37 U/L    Alk. phosphatase 66 50 - 136 U/L    Protein, total 8.2 6.3 - 8.2 g/dL    Albumin 3.2 3.2 - 4.6 g/dL    Globulin 5.0 (H) 2.3 - 3.5 g/dL    A-G Ratio 0.6 (L) 1.2 - 3.5     CBC WITH AUTOMATED DIFF    Collection Time: 10/22/21  1:01 PM   Result Value Ref Range    WBC 9.0 4.3 - 11.1 K/uL    RBC 3.94 (L) 4.05 - 5.2 M/uL    HGB 12.9 11.7 - 15.4 g/dL    HCT 39.8 35.8 - 46.3 %    .0 (H) 79.6 - 97.8 FL    MCH 32.7 26.1 - 32.9 PG    MCHC 32.4 31.4 - 35.0 g/dL    RDW 11.9 11.9 - 14.6 %    PLATELET 291 235 - 342 K/uL    MPV 11.0 9.4 - 12.3 FL    ABSOLUTE NRBC 0.00 0.0 - 0.2 K/uL    DF AUTOMATED      NEUTROPHILS 82 (H) 43 - 78 %    LYMPHOCYTES 10 (L) 13 - 44 %    MONOCYTES 8 4.0 - 12.0 %    EOSINOPHILS 0 (L) 0.5 - 7.8 %    BASOPHILS 0 0.0 - 2.0 %    IMMATURE GRANULOCYTES 0 0.0 - 5.0 %    ABS. NEUTROPHILS 7.4 1.7 - 8.2 K/UL    ABS. LYMPHOCYTES 0.9 0.5 - 4.6 K/UL    ABS. MONOCYTES 0.7 0.1 - 1.3 K/UL    ABS. EOSINOPHILS 0.0 0.0 - 0.8 K/UL    ABS. BASOPHILS 0.0 0.0 - 0.2 K/UL    ABS. IMM.  GRANS. 0.0 0.0 - 0.5 K/UL   HEMOGLOBIN A1C WITH EAG    Collection Time: 10/22/21  1:01 PM   Result Value Ref Range    Hemoglobin A1c 6.4 (H) 4.2 - 6.3 %    Est. average glucose 137 mg/dL   CK    Collection Time: 10/22/21  3:56 PM   Result Value Ref Range     (H) 21 - 215 U/L   GLUCOSE, POC    Collection Time: 10/22/21  8:56 PM   Result Value Ref Range    Glucose (POC) 194 (H) 65 - 100 mg/dL    Performed by [facility]    METABOLIC PANEL, BASIC    Collection Time: 10/23/21  5:25 AM   Result Value Ref Range    Sodium 144 136 - 145 mmol/L    Potassium 3.6 3.5 - 5.1 mmol/L    Chloride 108 (H) 98 - 107 mmol/L    CO2 29 21 - 32 mmol/L    Anion gap 7 7 - 16 mmol/L    Glucose 135 (H) 65 - 100 mg/dL    BUN 27 (H) 8 - 23 MG/DL    Creatinine 1.11 (H) 0.6 - 1.0 MG/DL    GFR est AA 59 (L) >60 ml/min/1.73m2    GFR est non-AA 49 (L) >60 ml/min/1.73m2    Calcium 9.0 8.3 - 10.4 MG/DL   CBC WITH AUTOMATED DIFF    Collection Time: 10/23/21  5:25 AM   Result Value Ref Range    WBC 8.0 4.3 - 11.1 K/uL    RBC 3.62 (L) 4.05 - 5.2 M/uL    HGB 11.5 (L) 11.7 - 15.4 g/dL    HCT 36.0 35.8 - 46.3 %    MCV 99.4 (H) 79.6 - 97.8 FL    MCH 31.8 26.1 - 32.9 PG    MCHC 31.9 31.4 - 35.0 g/dL    RDW 11.9 11.9 - 14.6 %    PLATELET 738 082 - 127 K/uL    MPV 10.2 9.4 - 12.3 FL    ABSOLUTE NRBC 0.00 0.0 - 0.2 K/uL    DF AUTOMATED      NEUTROPHILS 73 43 - 78 %    LYMPHOCYTES 16 13 - 44 %    MONOCYTES 10 4.0 - 12.0 %    EOSINOPHILS 1 0.5 - 7.8 %    BASOPHILS 0 0.0 - 2.0 %    IMMATURE GRANULOCYTES 0 0.0 - 5.0 %    ABS. NEUTROPHILS 5.8 1.7 - 8.2 K/UL    ABS. LYMPHOCYTES 1.3 0.5 - 4.6 K/UL    ABS. MONOCYTES 0.8 0.1 - 1.3 K/UL    ABS. EOSINOPHILS 0.0 0.0 - 0.8 K/UL    ABS. BASOPHILS 0.0 0.0 - 0.2 K/UL    ABS. IMM.  GRANS. 0.0 0.0 - 0.5 K/UL   CK    Collection Time: 10/23/21  5:25 AM   Result Value Ref Range     (H) 21 - 215 U/L   GLUCOSE, POC    Collection Time: 10/23/21  8:14 AM   Result Value Ref Range    Glucose (POC) 132 (H) 65 - 100 mg/dL    Performed by [employer] [place]        All Micro Results     None          Other Studies:  XR CHEST PA LAT    Result Date: 10/22/2021  History: fall with chest pain Two views chest COMPARISON: 4/29/2017 Findings: No new alveolar infiltrate or pleural effusion. The cardiac silhouette, and mediastinal contour, and osseous structures are stable. No acute findings. XR WRIST LT AP/LAT/OBL MIN 3V    Result Date: 10/22/2021  History: Fall with left hip and left wrist pain EXAM: Left hip and left wrist series FINDINGS: Left hip: There is osteopenia degenerative change of the low lumbar spine present. No acute fracture or dislocation. Left wrist: Degenerative change of the first carpometacarpal articulation present. There is a bony fragment projecting off the dorsal margin of the proximal carpal row. There is osteopenia. 1. Bony fragment projecting off the dorsal margin of the proximal carpal row. Findings could represent the sequela of degenerative change, though a triquetral fracture could have this appearance as well. Consider correlation with MRI for further evaluation if it will alter the clinical management. 2. Osteopenia. XR HIP LT W OR WO PELV 2-3 VWS    Result Date: 10/22/2021  History: Fall with left hip and left wrist pain EXAM: Left hip and left wrist series FINDINGS: Left hip: There is osteopenia degenerative change of the low lumbar spine present. No acute fracture or dislocation. Left wrist: Degenerative change of the first carpometacarpal articulation present. There is a bony fragment projecting off the dorsal margin of the proximal carpal row. There is osteopenia. 1. Bony fragment projecting off the dorsal margin of the proximal carpal row. Findings could represent the sequela of degenerative change, though a triquetral fracture could have this appearance as well. Consider correlation with MRI for further evaluation if it will alter the clinical management. 2. Osteopenia.       Current Meds:  Current Facility-Administered Medications   Medication Dose Route Frequency    amiodarone (CORDARONE) tablet 200 mg  200 mg Oral DAILY    cyanocobalamin tablet 1,000 mcg 1,000 mcg Oral DAILY    docusate sodium (COLACE) capsule 100 mg  100 mg Oral BID    budesonide-formoteroL (SYMBICORT) 160-4.5 mcg/actuation HFA inhaler 2 Puff  2 Puff Inhalation BID    levothyroxine (SYNTHROID) tablet 25 mcg  25 mcg Oral ACB    cetirizine (ZYRTEC) tablet 10 mg  10 mg Oral DAILY    metoprolol succinate (TOPROL-XL) XL tablet 50 mg  50 mg Oral BID    magnesium oxide (MAG-OX) tablet 400 mg  400 mg Oral DAILY    pantoprazole (PROTONIX) tablet 40 mg  40 mg Oral DAILY    rivaroxaban (XARELTO) tablet 15 mg  15 mg Oral DAILY WITH BREAKFAST    senna (SENOKOT) tablet 8.6 mg  1 Tablet Oral DAILY    tuberculin injection 5 Units  5 Units IntraDERMal ONCE    insulin lispro (HUMALOG) injection   SubCUTAneous AC&HS    sodium chloride (NS) flush 5-40 mL  5-40 mL IntraVENous Q8H    sodium chloride (NS) flush 5-40 mL  5-40 mL IntraVENous PRN    acetaminophen (TYLENOL) tablet 650 mg  650 mg Oral Q6H PRN    Or    acetaminophen (TYLENOL) suppository 650 mg  650 mg Rectal Q6H PRN    polyethylene glycol (MIRALAX) packet 17 g  17 g Oral DAILY PRN    ondansetron (ZOFRAN ODT) tablet 4 mg  4 mg Oral Q8H PRN    Or    ondansetron (ZOFRAN) injection 4 mg  4 mg IntraVENous Q6H PRN    nystatin (MYCOSTATIN) 100,000 unit/gram powder   Topical BID    LORazepam (ATIVAN) injection 1 mg  1 mg IntraVENous Q4H PRN     Labs, vital signs, diagnostic testing reviewed. Case discussed with patient, care team, Dr. Hank Mcmillan. Signed:  Mauricio Knight NP    Part of this note may have been written by using a voice dictation software. The note has been proof read but may still contain some grammatical/other typographical errors.

## 2021-10-24 LAB
ALBUMIN SERPL-MCNC: 2.4 G/DL (ref 3.2–4.6)
ALBUMIN/GLOB SERPL: 0.6 {RATIO} (ref 1.2–3.5)
ALP SERPL-CCNC: 57 U/L (ref 50–136)
ALT SERPL-CCNC: 34 U/L (ref 12–65)
ANION GAP SERPL CALC-SCNC: 5 MMOL/L (ref 7–16)
AST SERPL-CCNC: 38 U/L (ref 15–37)
BILIRUB SERPL-MCNC: 0.5 MG/DL (ref 0.2–1.1)
BUN SERPL-MCNC: 30 MG/DL (ref 8–23)
CALCIUM SERPL-MCNC: 8.6 MG/DL (ref 8.3–10.4)
CHLORIDE SERPL-SCNC: 105 MMOL/L (ref 98–107)
CK SERPL-CCNC: 295 U/L (ref 21–215)
CO2 SERPL-SCNC: 28 MMOL/L (ref 21–32)
CREAT SERPL-MCNC: 1.27 MG/DL (ref 0.6–1)
ECHO AO ASC DIAM: 2.9 CM
ECHO AO ROOT DIAM: 2.6 CM
ECHO AV AREA PEAK VELOCITY: 1.46 CM2
ECHO AV AREA/BSA PEAK VELOCITY: 0.8 CM2/M2
ECHO AV PEAK GRADIENT: 6 MMHG
ECHO AV PEAK VELOCITY: 119 CM/S
ECHO EST RA PRESSURE: 3 MMHG
ECHO LA AREA 2C: 23 CM2
ECHO LA AREA 4C: 15 CM2
ECHO LA MAJOR AXIS: 5.51 CM
ECHO LA MINOR AXIS: 3.04 CM
ECHO LV E' LATERAL VELOCITY: 6.09 CM/S
ECHO LV E' SEPTAL VELOCITY: 3.4 CM/S
ECHO LV EDV A2C: 48.7 CM3
ECHO LV EDV A4C: 50.3 CM3
ECHO LV ESV A2C: 21.2 CM3
ECHO LV ESV A4C: 18.7 CM3
ECHO LV INTERNAL DIMENSION DIASTOLIC: 3.51 CM (ref 3.9–5.3)
ECHO LV INTERNAL DIMENSION SYSTOLIC: 2.26 CM
ECHO LV IVSD: 0.86 CM (ref 0.6–0.9)
ECHO LV MASS 2D: 98.6 G (ref 67–162)
ECHO LV MASS INDEX 2D: 54.5 G/M2 (ref 43–95)
ECHO LV POSTERIOR WALL DIASTOLIC: 1.07 CM (ref 0.6–0.9)
ECHO LVOT DIAM: 1.7 CM
ECHO LVOT PEAK GRADIENT: 2 MMHG
ECHO MV A VELOCITY: 38.3 CM/S
ECHO MV E DECELERATION TIME (DT): 225 MS
ECHO MV E VELOCITY: 102 CM/S
ECHO MV E/A RATIO: 2.66
ECHO MV E/E' LATERAL: 16.75
ECHO MV E/E' RATIO (AVERAGED): 23.37
ECHO MV E/E' SEPTAL: 30
ECHO RIGHT VENTRICULAR SYSTOLIC PRESSURE (RVSP): 34 MMHG
ECHO RV INTERNAL DIMENSION: 2.81 CM
ECHO RV TAPSE: 2.05 CM (ref 1.5–2)
ECHO TV REGURGITANT MAX VELOCITY: 279 CM/S
ECHO TV REGURGITANT PEAK GRADIENT: 31 MMHG
ERYTHROCYTE [DISTWIDTH] IN BLOOD BY AUTOMATED COUNT: 12 % (ref 11.9–14.6)
GLOBULIN SER CALC-MCNC: 4.3 G/DL (ref 2.3–3.5)
GLUCOSE BLD STRIP.AUTO-MCNC: 127 MG/DL (ref 65–100)
GLUCOSE BLD STRIP.AUTO-MCNC: 137 MG/DL (ref 65–100)
GLUCOSE BLD STRIP.AUTO-MCNC: 170 MG/DL (ref 65–100)
GLUCOSE BLD STRIP.AUTO-MCNC: 201 MG/DL (ref 65–100)
GLUCOSE SERPL-MCNC: 151 MG/DL (ref 65–100)
HCT VFR BLD AUTO: 33.8 % (ref 35.8–46.3)
HGB BLD-MCNC: 10.9 G/DL (ref 11.7–15.4)
MCH RBC QN AUTO: 33.1 PG (ref 26.1–32.9)
MCHC RBC AUTO-ENTMCNC: 32.2 G/DL (ref 31.4–35)
MCV RBC AUTO: 102.7 FL (ref 79.6–97.8)
MM INDURATION POC: 0 MM (ref 0–5)
NRBC # BLD: 0 K/UL (ref 0–0.2)
PLATELET # BLD AUTO: 180 K/UL (ref 150–450)
PMV BLD AUTO: 10.7 FL (ref 9.4–12.3)
POTASSIUM SERPL-SCNC: 3.7 MMOL/L (ref 3.5–5.1)
PPD POC: NEGATIVE NEGATIVE
PROT SERPL-MCNC: 6.7 G/DL (ref 6.3–8.2)
RBC # BLD AUTO: 3.29 M/UL (ref 4.05–5.2)
SERVICE CMNT-IMP: ABNORMAL
SODIUM SERPL-SCNC: 138 MMOL/L (ref 136–145)
WBC # BLD AUTO: 7 K/UL (ref 4.3–11.1)

## 2021-10-24 PROCEDURE — 36415 COLL VENOUS BLD VENIPUNCTURE: CPT

## 2021-10-24 PROCEDURE — 94640 AIRWAY INHALATION TREATMENT: CPT

## 2021-10-24 PROCEDURE — 74011250636 HC RX REV CODE- 250/636: Performed by: STUDENT IN AN ORGANIZED HEALTH CARE EDUCATION/TRAINING PROGRAM

## 2021-10-24 PROCEDURE — 82550 ASSAY OF CK (CPK): CPT

## 2021-10-24 PROCEDURE — 74011000250 HC RX REV CODE- 250: Performed by: STUDENT IN AN ORGANIZED HEALTH CARE EDUCATION/TRAINING PROGRAM

## 2021-10-24 PROCEDURE — 77030040361 HC SLV COMPR DVT MDII -B

## 2021-10-24 PROCEDURE — 77030040393 HC DRSG OPTIFOAM GENT MDII -B

## 2021-10-24 PROCEDURE — 2709999900 HC NON-CHARGEABLE SUPPLY

## 2021-10-24 PROCEDURE — 74011636637 HC RX REV CODE- 636/637: Performed by: FAMILY MEDICINE

## 2021-10-24 PROCEDURE — 82962 GLUCOSE BLOOD TEST: CPT

## 2021-10-24 PROCEDURE — 74011250637 HC RX REV CODE- 250/637: Performed by: FAMILY MEDICINE

## 2021-10-24 PROCEDURE — 65270000029 HC RM PRIVATE

## 2021-10-24 PROCEDURE — 80053 COMPREHEN METABOLIC PANEL: CPT

## 2021-10-24 PROCEDURE — 85027 COMPLETE CBC AUTOMATED: CPT

## 2021-10-24 RX ORDER — LIDOCAINE 4 G/100G
1 PATCH TOPICAL EVERY 24 HOURS
Status: DISCONTINUED | OUTPATIENT
Start: 2021-10-24 | End: 2021-10-28 | Stop reason: HOSPADM

## 2021-10-24 RX ORDER — SODIUM CHLORIDE, SODIUM LACTATE, POTASSIUM CHLORIDE, CALCIUM CHLORIDE 600; 310; 30; 20 MG/100ML; MG/100ML; MG/100ML; MG/100ML
75 INJECTION, SOLUTION INTRAVENOUS CONTINUOUS
Status: DISPENSED | OUTPATIENT
Start: 2021-10-24 | End: 2021-10-24

## 2021-10-24 RX ADMIN — METOPROLOL SUCCINATE 50 MG: 50 TABLET, EXTENDED RELEASE ORAL at 17:04

## 2021-10-24 RX ADMIN — BUDESONIDE AND FORMOTEROL FUMARATE DIHYDRATE 2 PUFF: 160; 4.5 AEROSOL RESPIRATORY (INHALATION) at 07:44

## 2021-10-24 RX ADMIN — RIVAROXABAN 15 MG: 15 TABLET, FILM COATED ORAL at 09:20

## 2021-10-24 RX ADMIN — SODIUM CHLORIDE 10 ML: 9 INJECTION INTRAMUSCULAR; INTRAVENOUS; SUBCUTANEOUS at 06:28

## 2021-10-24 RX ADMIN — NYSTATIN: 100000 POWDER TOPICAL at 09:21

## 2021-10-24 RX ADMIN — BUDESONIDE AND FORMOTEROL FUMARATE DIHYDRATE 2 PUFF: 160; 4.5 AEROSOL RESPIRATORY (INHALATION) at 19:27

## 2021-10-24 RX ADMIN — CETIRIZINE HYDROCHLORIDE 10 MG: 5 TABLET ORAL at 09:18

## 2021-10-24 RX ADMIN — DOCUSATE SODIUM 100 MG: 100 CAPSULE, LIQUID FILLED ORAL at 09:18

## 2021-10-24 RX ADMIN — SODIUM CHLORIDE 10 ML: 9 INJECTION INTRAMUSCULAR; INTRAVENOUS; SUBCUTANEOUS at 13:53

## 2021-10-24 RX ADMIN — PANTOPRAZOLE SODIUM 40 MG: 40 TABLET, DELAYED RELEASE ORAL at 09:20

## 2021-10-24 RX ADMIN — SENNOSIDES 8.6 MG: 8.6 TABLET, FILM COATED ORAL at 09:18

## 2021-10-24 RX ADMIN — LEVOTHYROXINE SODIUM 25 MCG: 0.05 TABLET ORAL at 09:20

## 2021-10-24 RX ADMIN — DOCUSATE SODIUM 100 MG: 100 CAPSULE, LIQUID FILLED ORAL at 17:04

## 2021-10-24 RX ADMIN — ACETAMINOPHEN 650 MG: 325 TABLET ORAL at 09:18

## 2021-10-24 RX ADMIN — INSULIN LISPRO 2 UNITS: 100 INJECTION, SOLUTION INTRAVENOUS; SUBCUTANEOUS at 17:03

## 2021-10-24 RX ADMIN — CYANOCOBALAMIN TAB 1000 MCG 1000 MCG: 1000 TAB at 09:21

## 2021-10-24 RX ADMIN — INSULIN LISPRO 4 UNITS: 100 INJECTION, SOLUTION INTRAVENOUS; SUBCUTANEOUS at 12:23

## 2021-10-24 RX ADMIN — Medication 400 MG: at 09:18

## 2021-10-24 RX ADMIN — SODIUM CHLORIDE, SODIUM LACTATE, POTASSIUM CHLORIDE, AND CALCIUM CHLORIDE 75 ML/HR: 600; 310; 30; 20 INJECTION, SOLUTION INTRAVENOUS at 09:14

## 2021-10-24 RX ADMIN — ACETAMINOPHEN 650 MG: 325 TABLET ORAL at 17:04

## 2021-10-24 RX ADMIN — NYSTATIN: 100000 POWDER TOPICAL at 21:17

## 2021-10-24 NOTE — PROGRESS NOTES
Hospitalist Progress Note   Admit Date:  10/22/2021 12:31 PM   Name:  Jose Daniel Gambino   Age:  80 y.o. Sex:  female  :  1926   MRN:  [id number]   Room:      Presenting Complaint: Fall    Reason(s) for Admission: Rhabdomyolysis [facility] [place] Course & Interval History:   Patient is a 81 y/o female with PMH HTN, HLD, GERD, hypothyroidism, PE (2017), sCHF (EF 30% ), Atrial Fibrillation (on Xarelto), CKD III, DMII, dementia, possible Parkinsons who presented to ED after being found on floor past day after 's were called to perform welfare check. Patient states she fell and was unable to get back up. Daughter indicates multiple falls past week. In ED, patient was febrile to 100.1 with CK of 831. Left Wrist XR with bony fragment projecting off the dorsal margin of the proximal carpal row. Findings could represent degenerative change, though a triquetral fracture could have this appearance as well. Left Hip XR with no acute fracture or dislocation. Subjective (10/24/21):  Patient [place] but oriented x 4. Drowsy but awakened by voice. Denies chest pain, SOB, abdominal pain. Endorses RUE pain today and back pain. CM to assist with rehab options today.     Assessment & Plan:     Principal Problem:  Rhabdomyolysis  -Received gentle IVF due to history of sCHF  -Hold K supplement and Lasix  - --> 692 --> 295    Active Problems:  Hypertension   -Normotensive 125/69, on BB    Atrial fibrillation (HCC)   -On Xarelto, BB, Amiodarone  -Need to discuss risk vs. benefit of [address] in elderly patient with dementia and increased falls - CHADSVASC 6 with high risk for stroke and HASBLED 2 with mod risk for bleed  -Continuous telemetry, currently NSR HR 64  -Cannot locate Amiodarone on active prescriptions, daughter brought bottles to bedside and no Amiodarone present, will hold, daughter not sure, continue BB for rate control    Hypothyroidism  -TSH wnl, T4 1.5, continue Synthroid    Hx of PE (2017)  -On Xarelto  -Stable on RA    Dementia  -Noted, re-orient as needed, is oriented x 4 on rounding, encourage good sleep-wake cycle, minimize nighttime interruptions, promote good sleep    Diabetes Mellitus II  -A1C 6.4 (10/22)  -SSI while inpatient    Chronic Systolic Heart Failure  -EF 30% 2017  -Lasix and K supplement held  -Repeat Echo 10/23 with LVEF normal systolic and diastolic function    Concern for Dysphagia  -Passed bedside swallow with nurse, SLP evaluated with oropharyngeal dysphagia, recs for soft, bite size diet with thin liquids    History of Recent Falls  -PT/OT with recs for skilled therapy  -Family would like rehab with transition to [place] [address] consulted for assistance with facility choice    Dispo/Discharge Planning:  Pending rehab placement    Diet:  ADULT DIET Dysphagia - Soft & Bite Sized; 4 carb choices (60 gm/meal);  Low Fat/Low Chol/High Fiber/2 gm Na  DVT PPx: Xarelto  Code status: Full Code    Hospital Problems as of 10/24/2021 Date Reviewed: 6/1/2017        Codes Class Noted - Resolved POA    * (Principal) Rhabdomyolysis ICD-10-CM: D20.15  ICD-9-CM: 728.88  10/22/2021 - Present Unknown        Atrial fibrillation ([facility] [address] ICD-10-CM: I48.91  ICD-9-CM: 427.31  4/4/2017 - Present Yes        Hypertension ICD-10-CM: I10  ICD-9-CM: 401.9  7/1/2015 - Present Yes        Hyperlipidemia ICD-10-CM: E78.5  ICD-9-CM: 272.4  7/1/2015 - Present Yes              Objective:     Patient Vitals for the past 24 hrs:   Temp Pulse Resp BP SpO2   10/24/21 1119 98.4 °F (36.9 °C) 62 17 125/69 92 %   10/24/21 0744 -- -- -- -- 100 %   10/24/21 0712 98.9 °F (37.2 °C) 64 19 122/66 93 %   10/24/21 0403 98 °F (36.7 °C) 86 20 125/82 96 %   10/23/21 2344 98.2 °F (36.8 °C) 76 20 115/66 94 %   10/23/21 2026 98 °F (36.7 °C) 73 21 105/63 95 %   10/23/21 2000 -- -- -- -- 94 %   10/23/21 1802 -- 68 -- (!) 141/54 --   10/23/21 1541 99.8 °F (37.7 °C) 70 22 (!) 92/53 93 %     Oxygen Therapy  O2 Sat (%): 92 % (10/24/21 1119)  Pulse via Oximetry: 54 beats per minute (10/24/21 0744)  O2 Device: None (Room air) (10/24/21 0744)    Estimated body mass index is 32.01 kg/m² as calculated from the following:    Height as of this encounter: 5' 2\" (1.575 m). Weight as of this encounter: 79.4 kg (175 lb). Intake/Output Summary (Last 24 hours) at 10/24/2021 1351  Last data filed at 10/24/2021 1256  Gross per 24 hour   Intake 1200 ml   Output 675 ml   Net 525 ml         Physical Exam:   Blood pressure 125/69, pulse 62, temperature 98.4 °F (36.9 °C), resp. rate 17, height 5' 2\" (1.575 m), weight 79.4 kg (175 lb), SpO2 92 %. General:    Frail, no acute distress, [place], drowsy  Head:  Normocephalic, atraumatic  Eyes:  Sclerae appear normal.  Pupils equally round. ENT:  Nares appear normal, no drainage. Moist oral mucosa. Missing teeth. Neck:  No restricted ROM. Trachea midline   CV:   Irregular rate and rhythm. No m/r/g. No JVD. Lungs:   CTAB. No wheezing, rhonchi. Respirations even, unlabored  Abdomen: Bowel sounds present. Soft, nontender, nondistended. Extremities: No cyanosis or clubbing. No edema. Left wrist without erythema, mild swelling, not tender to palpation  Skin:     No rashes and normal coloration. Warm and dry. Neuro:  CN II-XII grossly intact. Sensation intact. A&Ox4, moves all extremities  Psych:  Normal mood and affect.       I have reviewed ordered lab tests and independently visualized imaging below:    Recent Labs:  Recent Results (from the past 48 hour(s))   CK    Collection Time: 10/22/21  3:56 PM   Result Value Ref Range     (H) 21 - 215 U/L   GLUCOSE, POC    Collection Time: 10/22/21  8:56 PM   Result Value Ref Range    Glucose (POC) 194 (H) 65 - 100 mg/dL    Performed by WUT    PLEASE READ & DOCUMENT PPD TEST IN 24 HRS    Collection Time: 10/23/21 12:00 AM   Result Value Ref Range    PPD Negative Negative    mm Induration 0 0 - 5 mm   METABOLIC PANEL, BASIC    Collection Time: 10/23/21  5:25 AM   Result Value Ref Range    Sodium 144 136 - 145 mmol/L    Potassium 3.6 3.5 - 5.1 mmol/L    Chloride 108 (H) 98 - 107 mmol/L    CO2 29 21 - 32 mmol/L    Anion gap 7 7 - 16 mmol/L    Glucose 135 (H) 65 - 100 mg/dL    BUN 27 (H) 8 - 23 MG/DL    Creatinine 1.11 (H) 0.6 - 1.0 MG/DL    GFR est AA 59 (L) >60 ml/min/1.73m2    GFR est non-AA 49 (L) >60 ml/min/1.73m2    Calcium 9.0 8.3 - 10.4 MG/DL   CBC WITH AUTOMATED DIFF    Collection Time: 10/23/21  5:25 AM   Result Value Ref Range    WBC 8.0 4.3 - 11.1 K/uL    RBC 3.62 (L) 4.05 - 5.2 M/uL    HGB 11.5 (L) 11.7 - 15.4 g/dL    HCT 36.0 35.8 - 46.3 %    MCV 99.4 (H) 79.6 - 97.8 FL    MCH 31.8 26.1 - 32.9 PG    MCHC 31.9 31.4 - 35.0 g/dL    RDW 11.9 11.9 - 14.6 %    PLATELET 248 259 - 203 K/uL    MPV 10.2 9.4 - 12.3 FL    ABSOLUTE NRBC 0.00 0.0 - 0.2 K/uL    DF AUTOMATED      NEUTROPHILS 73 43 - 78 %    LYMPHOCYTES 16 13 - 44 %    MONOCYTES 10 4.0 - 12.0 %    EOSINOPHILS 1 0.5 - 7.8 %    BASOPHILS 0 0.0 - 2.0 %    IMMATURE GRANULOCYTES 0 0.0 - 5.0 %    ABS. NEUTROPHILS 5.8 1.7 - 8.2 K/UL    ABS. LYMPHOCYTES 1.3 0.5 - 4.6 K/UL    ABS. MONOCYTES 0.8 0.1 - 1.3 K/UL    ABS. EOSINOPHILS 0.0 0.0 - 0.8 K/UL    ABS. BASOPHILS 0.0 0.0 - 0.2 K/UL    ABS. IMM.  GRANS. 0.0 0.0 - 0.5 K/UL   CK    Collection Time: 10/23/21  5:25 AM   Result Value Ref Range     (H) 21 - 215 U/L   GLUCOSE, POC    Collection Time: 10/23/21  8:14 AM   Result Value Ref Range    Glucose (POC) 132 (H) 65 - 100 mg/dL    Performed by iMlady Santillan    ECHO ADULT COMPLETE    Collection Time: 10/23/21 11:30 AM   Result Value Ref Range    LV ED Vol A2C 48.70 cm3    LV ED Vol A4C 50.30 cm3    LV ES Vol A2C 21.20 cm3    LV ES Vol A4C 18.70 cm3    IVSd 0.86 0.60 - 0.90 cm    LVIDd 3.51 (A) 3.90 - 5.30 cm    LVIDs 2.26 cm    LVOT d 1.70 cm    LVOT Peak Gradient 2.00 mmHg    LVPWd 1.07 (A) 0.60 - 0.90 cm    LV E' Lateral Velocity 6.09 cm/s    LV E' Septal Velocity 3.40 cm/s    Left Atrium Minor Axis 3.04 cm    Left Atrium Major Axis 5.51 cm    LA Area 2C 23.00 cm2    LA Area 4C 15.00 cm2    Aortic Valve Systolic Peak Velocity 787.46 cm/s    AoV PG 6.00 mmHg    Aortic Valve Area by Continuity of Peak Velocity 1.46 cm2    Ao Root D 2.60 cm    AO ASC D 2.90 cm    Mitral Valve E Wave Deceleration Time 225.00 ms    MV A Hany 38.30 cm/s    MV E Hany 102.00 cm/s    RVIDd 2.81 cm    Tapse 2.05 (A) 1.50 - 2.00 cm    TR Max Velocity 279.00 cm/s    Triscuspid Valve Regurgitation Peak Gradient 31.00 mmHg    MV E/A 2.66     LV Mass AL 98.6 67.0 - 162.0 g    LV Mass AL Index 54.5 43.0 - 95.0 g/m2    E/E' lateral 16.75     E/E' septal 30.00     RVSP 34.0 mmHg    E/E' ratio (averaged) 23.37     Est. RA Pressure 3.0 mmHg    LORETTA/BSA Pk Hany 0.8 cm2/m2   GLUCOSE, POC    Collection Time: 10/23/21 12:19 PM   Result Value Ref Range    Glucose (POC) 139 (H) 65 - 100 mg/dL    Performed by Binta Harrington    GLUCOSE, POC    Collection Time: 10/23/21  4:09 PM   Result Value Ref Range    Glucose (POC) 135 (H) 65 - 100 mg/dL    Performed by Ghazala    GLUCOSE, POC    Collection Time: 10/23/21  9:01 PM   Result Value Ref Range    Glucose (POC) 147 (H) 65 - 100 mg/dL    Performed by Jeremy    CBC W/O DIFF    Collection Time: 10/24/21  5:06 AM   Result Value Ref Range    WBC 7.0 4.3 - 11.1 K/uL    RBC 3.29 (L) 4.05 - 5.2 M/uL    HGB 10.9 (L) 11.7 - 15.4 g/dL    HCT 33.8 (L) 35.8 - 46.3 %    .7 (H) 79.6 - 97.8 FL    MCH 33.1 (H) 26.1 - 32.9 PG    MCHC 32.2 31.4 - 35.0 g/dL    RDW 12.0 11.9 - 14.6 %    PLATELET 109 970 - 338 K/uL    MPV 10.7 9.4 - 12.3 FL    ABSOLUTE NRBC 0.00 0.0 - 0.2 K/uL   CK    Collection Time: 10/24/21  5:06 AM   Result Value Ref Range     (H) 21 - 632 U/L   METABOLIC PANEL, COMPREHENSIVE    Collection Time: 10/24/21  5:06 AM   Result Value Ref Range    Sodium 138 136 - 145 mmol/L    Potassium 3.7 3.5 - 5.1 mmol/L    Chloride 105 98 - 107 mmol/L    CO2 28 21 - 32 mmol/L    Anion gap 5 (L) 7 - 16 mmol/L    Glucose 151 (H) 65 - 100 mg/dL    BUN 30 (H) 8 - 23 MG/DL    Creatinine 1.27 (H) 0.6 - 1.0 MG/DL    GFR est AA 50 (L) >60 ml/min/1.73m2    GFR est non-AA 42 (L) >60 ml/min/1.73m2    Calcium 8.6 8.3 - 10.4 MG/DL    Bilirubin, total 0.5 0.2 - 1.1 MG/DL    ALT (SGPT) 34 12 - 65 U/L    AST (SGOT) 38 (H) 15 - 37 U/L    Alk. phosphatase 57 50 - 136 U/L    Protein, total 6.7 6.3 - 8.2 g/dL    Albumin 2.4 (L) 3.2 - 4.6 g/dL    Globulin 4.3 (H) 2.3 - 3.5 g/dL    A-G Ratio 0.6 (L) 1.2 - 3.5     GLUCOSE, POC    Collection Time: 10/24/21  8:08 AM   Result Value Ref Range    Glucose (POC) 137 (H) 65 - 100 mg/dL    Performed by Gennaro Cai, POC    Collection Time: 10/24/21 11:58 AM   Result Value Ref Range    Glucose (POC) 201 (H) 65 - 100 mg/dL    Performed by John Hoffman Micro Results     None          Other Studies:  No results found.     Current Meds:  Current Facility-Administered Medications   Medication Dose Route Frequency    lactated Ringers infusion  75 mL/hr IntraVENous CONTINUOUS    lidocaine 4 % patch 1 Patch  1 Patch TransDERmal Q24H    amiodarone (CORDARONE) tablet 200 mg  200 mg Oral DAILY    cyanocobalamin tablet 1,000 mcg  1,000 mcg Oral DAILY    docusate sodium (COLACE) capsule 100 mg  100 mg Oral BID    budesonide-formoteroL (SYMBICORT) 160-4.5 mcg/actuation HFA inhaler 2 Puff  2 Puff Inhalation BID    levothyroxine (SYNTHROID) tablet 25 mcg  25 mcg Oral ACB    cetirizine (ZYRTEC) tablet 10 mg  10 mg Oral DAILY    metoprolol succinate (TOPROL-XL) XL tablet 50 mg  50 mg Oral BID    magnesium oxide (MAG-OX) tablet 400 mg  400 mg Oral DAILY    pantoprazole (PROTONIX) tablet 40 mg  40 mg Oral DAILY    rivaroxaban (XARELTO) tablet 15 mg  15 mg Oral DAILY WITH BREAKFAST    senna (SENOKOT) tablet 8.6 mg  1 Tablet Oral DAILY    insulin lispro (HUMALOG) injection   SubCUTAneous AC&HS    sodium chloride (NS) flush 5-40 mL  5-40 mL IntraVENous Q8H    sodium chloride (NS) flush 5-40 mL  5-40 mL IntraVENous PRN    acetaminophen (TYLENOL) tablet 650 mg  650 mg Oral Q6H PRN    Or    acetaminophen (TYLENOL) suppository 650 mg  650 mg Rectal Q6H PRN    polyethylene glycol (MIRALAX) packet 17 g  17 g Oral DAILY PRN    ondansetron (ZOFRAN ODT) tablet 4 mg  4 mg Oral Q8H PRN    Or    ondansetron (ZOFRAN) injection 4 mg  4 mg IntraVENous Q6H PRN    nystatin (MYCOSTATIN) 100,000 unit/gram powder   Topical BID    LORazepam (ATIVAN) injection 1 mg  1 mg IntraVENous Q4H PRN     Labs, vital signs, diagnostic testing reviewed. Case discussed with patient, care team, Dr. Moises Maldonado. Signed:  Claudy Cisneros NP    Part of this note may have been written by using a voice dictation software. The note has been proof read but may still contain some grammatical/other typographical errors.

## 2021-10-24 NOTE — PROGRESS NOTES
CM advised that family requesting STR for patient. CM spoke with patient's daughter to complete initial assessment. Patient lives alone in a one story home with 4 NOBLE. Prior to hospitalization, patient was independent with ADL's, including bathing and dressing. DME's include a cane and rollator. Patient gets Meals on Wheels on Mondays, Wednesdays and Fridays. Daughter states that patient had [facility] in 2017 after leaving  [address] [facility] in 2017 (unknown if it was [address] [facility]). Daughter asked that referrals be sent to [facility] [address]. Referrals faxed. PPD has already been placed. CM will continue to follow to assist with discharge planning. Care Management Interventions  PCP Verified by CM:  Yes  Transition of Care Consult (CM Consult): SNF  Physical Therapy Consult: Yes  Occupational Therapy Consult: Yes  Speech Therapy Consult: Yes  Support Systems: Parent(s)  Discharge Location  Discharge Placement: Skilled nursing facility

## 2021-10-24 NOTE — PROGRESS NOTES
END OF SHIFT NOTE:    INTAKE/OUTPUT  10/23 0701 - 10/24 0700  In: 480 [P.O.:480]  Out: 675 [Urine:675]  Voiding: YES  Catheter: YES  Drain:              Flatus: Patient does have flatus present. Stool:  1 occurrences. Characteristics:  Stool Assessment  Stool Color: Brown  Stool Appearance: Other (comment) (no stool to assess at this time)  Stool Amount: Medium  Stool Source/Status: Rectum    Emesis: 0 occurrences. Characteristics:        VITAL SIGNS  Patient Vitals for the past 12 hrs:   Temp Pulse Resp BP SpO2   10/24/21 1704 -- 66 -- (!) 105/45 --   10/24/21 1613 98 °F (36.7 °C) 100 16 100/63 95 %   10/24/21 1119 98.4 °F (36.9 °C) 62 17 125/69 92 %   10/24/21 0744 -- -- -- -- 100 %   10/24/21 0712 98.9 °F (37.2 °C) 64 19 122/66 93 %       Pain Assessment  Pain Intensity 1: 0 (10/24/21 0403)  Pain Location 1: Back  Pain Intervention(s) 1: Back rub, Ambulation/Increased Activity, Distraction, Emotional support, Family Support, Nurse notified, Repositioned, Rest, Therapeutic presence, Therapeutic touch, Other (comment) (changed brief)  Patient Stated Pain Goal: 0    Ambulating  No    Shift report given to oncoming nurse at the bedside.     Elif Jernigan RN

## 2021-10-24 NOTE — PROGRESS NOTES
Problem: Falls - Risk of  Goal: *Absence of Falls  Description: Document Earma Antonella Fall Risk and appropriate interventions in the flowsheet. Outcome: Progressing Towards Goal  Note: Fall Risk Interventions:  Mobility Interventions: Communicate number of staff needed for ambulation/transfer, Patient to call before getting OOB    Mentation Interventions: Adequate sleep, hydration, pain control, Bed/chair exit alarm, Door open when patient unattended, Increase mobility, Reorient patient    Medication Interventions: Evaluate medications/consider consulting pharmacy, Patient to call before getting OOB, Teach patient to arise slowly    Elimination Interventions: Call light in reach, Patient to call for help with toileting needs    History of Falls Interventions: Bed/chair exit alarm, Consult care management for discharge planning, Door open when patient unattended, Evaluate medications/consider consulting pharmacy, Investigate reason for fall, Room close to nurse's station         Problem: Pressure Injury - Risk of  Goal: *Prevention of pressure injury  Description: Document Sergei Scale and appropriate interventions in the flowsheet.   Outcome: Progressing Towards Goal  Note: Pressure Injury Interventions:  Sensory Interventions: Assess changes in LOC, Assess need for specialty bed, Check visual cues for pain, Keep linens dry and wrinkle-free, Minimize linen layers    Moisture Interventions: Absorbent underpads, Apply protective barrier, creams and emollients, Internal/External urinary devices, Minimize layers    Activity Interventions: Increase time out of bed, Pressure redistribution bed/mattress(bed type), PT/OT evaluation    Mobility Interventions: HOB 30 degrees or less, Pressure redistribution bed/mattress (bed type), PT/OT evaluation    Nutrition Interventions: Document food/fluid/supplement intake                     Problem: Non-Violent Restraints  Goal: Removal from restraints as soon as assessed to be safe  Outcome: Progressing Towards Goal  Note: Restraints D/C 10/24  Goal: No harm/injury to patient while restraints in use  Outcome: Progressing Towards Goal  Goal: Patient's dignity will be maintained  Outcome: Progressing Towards Goal  Goal: Patient Interventions  Outcome: Progressing Towards Goal

## 2021-10-24 NOTE — PROGRESS NOTES
END OF SHIFT NOTE:    INTAKE/OUTPUT  10/23 0701 - 10/24 0700  In: 480 [P.O.:480]  Out: 675 [Urine:675]  Voiding: NO  Catheter: YES  Drain:              Flatus: Patient does have flatus present. Stool:  2 occurrences. Characteristics:  Stool Assessment  Stool Color: Brown  Stool Appearance: Loose, Soft  Stool Amount: Medium  Stool Source/Status: Rectum    Emesis: 0 occurrences. Characteristics:        VITAL SIGNS  Patient Vitals for the past 12 hrs:   Temp Pulse Resp BP SpO2   10/24/21 0403 98 °F (36.7 °C) 86 20 125/82 96 %   10/23/21 2344 98.2 °F (36.8 °C) 76 20 115/66 94 %   10/23/21 2026 98 °F (36.7 °C) 73 21 105/63 95 %   10/23/21 2000 -- -- -- -- 94 %       Pain Assessment  Pain Intensity 1: 0 (10/24/21 0403)  Pain Location 1: Back  Pain Intervention(s) 1: Back rub, Ambulation/Increased Activity, Distraction, Emotional support, Family Support, Nurse notified, Repositioned, Rest, Therapeutic presence, Therapeutic touch, Other (comment) (changed brief)  Patient Stated Pain Goal: 0    Ambulating  No    Shift report given to oncoming nurse at the bedside.     Elena Mesa RN

## 2021-10-25 LAB
ALBUMIN SERPL-MCNC: 2.2 G/DL (ref 3.2–4.6)
ALBUMIN/GLOB SERPL: 0.5 {RATIO} (ref 1.2–3.5)
ALP SERPL-CCNC: 48 U/L (ref 50–136)
ALT SERPL-CCNC: 28 U/L (ref 12–65)
ANION GAP SERPL CALC-SCNC: 7 MMOL/L (ref 7–16)
AST SERPL-CCNC: 31 U/L (ref 15–37)
BILIRUB SERPL-MCNC: 0.5 MG/DL (ref 0.2–1.1)
BUN SERPL-MCNC: 24 MG/DL (ref 8–23)
CALCIUM SERPL-MCNC: 8.9 MG/DL (ref 8.3–10.4)
CHLORIDE SERPL-SCNC: 107 MMOL/L (ref 98–107)
CK SERPL-CCNC: 136 U/L (ref 21–215)
CO2 SERPL-SCNC: 28 MMOL/L (ref 21–32)
COVID-19 RAPID TEST, COVR: NOT DETECTED
CREAT SERPL-MCNC: 1.04 MG/DL (ref 0.6–1)
ERYTHROCYTE [DISTWIDTH] IN BLOOD BY AUTOMATED COUNT: 11.9 % (ref 11.9–14.6)
GLOBULIN SER CALC-MCNC: 4.1 G/DL (ref 2.3–3.5)
GLUCOSE BLD STRIP.AUTO-MCNC: 120 MG/DL (ref 65–100)
GLUCOSE BLD STRIP.AUTO-MCNC: 121 MG/DL (ref 65–100)
GLUCOSE BLD STRIP.AUTO-MCNC: 160 MG/DL (ref 65–100)
GLUCOSE BLD STRIP.AUTO-MCNC: 194 MG/DL (ref 65–100)
GLUCOSE SERPL-MCNC: 128 MG/DL (ref 65–100)
HCT VFR BLD AUTO: 31.3 % (ref 35.8–46.3)
HGB BLD-MCNC: 10.2 G/DL (ref 11.7–15.4)
MCH RBC QN AUTO: 32.6 PG (ref 26.1–32.9)
MCHC RBC AUTO-ENTMCNC: 32.6 G/DL (ref 31.4–35)
MCV RBC AUTO: 100 FL (ref 79.6–97.8)
NRBC # BLD: 0 K/UL (ref 0–0.2)
PLATELET # BLD AUTO: 215 K/UL (ref 150–450)
PMV BLD AUTO: 10.8 FL (ref 9.4–12.3)
POTASSIUM SERPL-SCNC: 4.1 MMOL/L (ref 3.5–5.1)
PROT SERPL-MCNC: 6.3 G/DL (ref 6.3–8.2)
RBC # BLD AUTO: 3.13 M/UL (ref 4.05–5.2)
SARS-COV-2, COV2: NORMAL
SERVICE CMNT-IMP: ABNORMAL
SODIUM SERPL-SCNC: 142 MMOL/L (ref 136–145)
SOURCE, COVRS: NORMAL
WBC # BLD AUTO: 4.9 K/UL (ref 4.3–11.1)

## 2021-10-25 PROCEDURE — 87635 SARS-COV-2 COVID-19 AMP PRB: CPT

## 2021-10-25 PROCEDURE — 74011636637 HC RX REV CODE- 636/637: Performed by: FAMILY MEDICINE

## 2021-10-25 PROCEDURE — 82550 ASSAY OF CK (CPK): CPT

## 2021-10-25 PROCEDURE — 97530 THERAPEUTIC ACTIVITIES: CPT

## 2021-10-25 PROCEDURE — 94640 AIRWAY INHALATION TREATMENT: CPT

## 2021-10-25 PROCEDURE — 65270000029 HC RM PRIVATE

## 2021-10-25 PROCEDURE — 74011250637 HC RX REV CODE- 250/637: Performed by: FAMILY MEDICINE

## 2021-10-25 PROCEDURE — 2709999900 HC NON-CHARGEABLE SUPPLY

## 2021-10-25 PROCEDURE — 94760 N-INVAS EAR/PLS OXIMETRY 1: CPT

## 2021-10-25 PROCEDURE — 36415 COLL VENOUS BLD VENIPUNCTURE: CPT

## 2021-10-25 PROCEDURE — 92526 ORAL FUNCTION THERAPY: CPT

## 2021-10-25 PROCEDURE — 85027 COMPLETE CBC AUTOMATED: CPT

## 2021-10-25 PROCEDURE — 80053 COMPREHEN METABOLIC PANEL: CPT

## 2021-10-25 PROCEDURE — 97535 SELF CARE MNGMENT TRAINING: CPT

## 2021-10-25 PROCEDURE — 74011250637 HC RX REV CODE- 250/637: Performed by: EMERGENCY MEDICINE

## 2021-10-25 PROCEDURE — 82962 GLUCOSE BLOOD TEST: CPT

## 2021-10-25 RX ADMIN — DOCUSATE SODIUM 100 MG: 100 CAPSULE, LIQUID FILLED ORAL at 18:20

## 2021-10-25 RX ADMIN — SENNOSIDES 8.6 MG: 8.6 TABLET, FILM COATED ORAL at 09:28

## 2021-10-25 RX ADMIN — NYSTATIN: 100000 POWDER TOPICAL at 21:47

## 2021-10-25 RX ADMIN — LEVOTHYROXINE SODIUM 25 MCG: 0.05 TABLET ORAL at 09:30

## 2021-10-25 RX ADMIN — Medication 400 MG: at 09:29

## 2021-10-25 RX ADMIN — BUDESONIDE AND FORMOTEROL FUMARATE DIHYDRATE 2 PUFF: 160; 4.5 AEROSOL RESPIRATORY (INHALATION) at 07:57

## 2021-10-25 RX ADMIN — DOCUSATE SODIUM 100 MG: 100 CAPSULE, LIQUID FILLED ORAL at 09:29

## 2021-10-25 RX ADMIN — SODIUM CHLORIDE 10 ML: 9 INJECTION INTRAMUSCULAR; INTRAVENOUS; SUBCUTANEOUS at 15:14

## 2021-10-25 RX ADMIN — BUDESONIDE AND FORMOTEROL FUMARATE DIHYDRATE 2 PUFF: 160; 4.5 AEROSOL RESPIRATORY (INHALATION) at 20:09

## 2021-10-25 RX ADMIN — NYSTATIN: 100000 POWDER TOPICAL at 09:43

## 2021-10-25 RX ADMIN — CYANOCOBALAMIN TAB 1000 MCG 1000 MCG: 1000 TAB at 09:29

## 2021-10-25 RX ADMIN — INSULIN LISPRO 2 UNITS: 100 INJECTION, SOLUTION INTRAVENOUS; SUBCUTANEOUS at 21:49

## 2021-10-25 RX ADMIN — SODIUM CHLORIDE 10 ML: 9 INJECTION INTRAMUSCULAR; INTRAVENOUS; SUBCUTANEOUS at 21:47

## 2021-10-25 RX ADMIN — METOPROLOL SUCCINATE 50 MG: 50 TABLET, EXTENDED RELEASE ORAL at 18:20

## 2021-10-25 RX ADMIN — INSULIN LISPRO 2 UNITS: 100 INJECTION, SOLUTION INTRAVENOUS; SUBCUTANEOUS at 13:26

## 2021-10-25 RX ADMIN — CETIRIZINE HYDROCHLORIDE 10 MG: 5 TABLET ORAL at 09:29

## 2021-10-25 RX ADMIN — RIVAROXABAN 15 MG: 15 TABLET, FILM COATED ORAL at 09:29

## 2021-10-25 RX ADMIN — PANTOPRAZOLE SODIUM 40 MG: 40 TABLET, DELAYED RELEASE ORAL at 09:30

## 2021-10-25 NOTE — PROGRESS NOTES
CM continues to follow for discharge planning and/or CM needs. Pt plan to transition to SNF for STR. Referrals made yesterday by CM, however, computer glitch with [facility] resulted in facilities NOT receiving referrals. This CM spoke with admission coordinators regarding referrals and faxed this day. Will await response regarding bed availability. Pt with  COVID swab need. Relayed to RN. No additional CM needs at this time. Will continue to follow and update as needed.

## 2021-10-25 NOTE — PROGRESS NOTES
Problem: Falls - Risk of  Goal: *Absence of Falls  Description: Document Laretta Zoë Fall Risk and appropriate interventions in the flowsheet. Outcome: Progressing Towards Goal  Note: Fall Risk Interventions:  Mobility Interventions: Patient to call before getting OOB, Bed/chair exit alarm    Mentation Interventions: Adequate sleep, hydration, pain control, Bed/chair exit alarm, Door open when patient unattended, Evaluate medications/consider consulting pharmacy, Increase mobility, More frequent rounding, Reorient patient    Medication Interventions: Bed/chair exit alarm, Evaluate medications/consider consulting pharmacy, Patient to call before getting OOB, Teach patient to arise slowly    Elimination Interventions: Bed/chair exit alarm, Call light in reach    History of Falls Interventions: Bed/chair exit alarm, Door open when patient unattended, Evaluate medications/consider consulting pharmacy         Problem: Patient Education: Go to Patient Education Activity  Goal: Patient/Family Education  Outcome: Progressing Towards Goal     Problem: Pressure Injury - Risk of  Goal: *Prevention of pressure injury  Description: Document Sergei Scale and appropriate interventions in the flowsheet.   Outcome: Progressing Towards Goal  Note: Pressure Injury Interventions:  Sensory Interventions: Assess changes in LOC, Keep linens dry and wrinkle-free    Moisture Interventions: Absorbent underpads, Apply protective barrier, creams and emollients, Moisture barrier, Maintain skin hydration (lotion/cream)    Activity Interventions: Increase time out of bed, Pressure redistribution bed/mattress(bed type), PT/OT evaluation    Mobility Interventions: HOB 30 degrees or less, Pressure redistribution bed/mattress (bed type), PT/OT evaluation    Nutrition Interventions: Document food/fluid/supplement intake                     Problem: Patient Education: Go to Patient Education Activity  Goal: Patient/Family Education  Outcome: Progressing Towards Goal     Problem: Non-Violent Restraints  Goal: Removal from restraints as soon as assessed to be safe  Outcome: Progressing Towards Goal  Goal: No harm/injury to patient while restraints in use  Outcome: Progressing Towards Goal  Goal: Patient's dignity will be maintained  Outcome: Progressing Towards Goal  Goal: Patient Interventions  Outcome: Progressing Towards Goal     Problem: Patient Education: Go to Patient Education Activity  Goal: Patient/Family Education  Outcome: Progressing Towards Goal     Problem: Patient Education: Go to Patient Education Activity  Goal: Patient/Family Education  Outcome: Progressing Towards Goal

## 2021-10-25 NOTE — PROGRESS NOTES
END OF SHIFT NOTE:    INTAKE/OUTPUT  10/24 0701 - 10/25 0700  In: 9438 [P. O.:960; I.V.:1412]  Out: 950 [Urine:950]  Voiding: NO  Catheter: YES  Drain:              Flatus: Patient does have flatus present. Stool:  0 occurrences. Characteristics:  Stool Assessment  Stool Color: Brown  Stool Appearance: Other (comment) (no stool to assess at this time)  Stool Amount: Medium  Stool Source/Status: Rectum    Emesis: 0 occurrences. Characteristics:        VITAL SIGNS  Patient Vitals for the past 12 hrs:   Temp Pulse Resp BP SpO2   10/25/21 0615 98 °F (36.7 °C) 65 18 (!) 121/52 92 %   10/25/21 0610 97.9 °F (36.6 °C) 66 18 138/76 94 %   10/25/21 0259 97.7 °F (36.5 °C) 65 16 (!) 143/60 94 %   10/24/21 2336 98 °F (36.7 °C) 64 16 133/60 94 %   10/24/21 2013 98.1 °F (36.7 °C) 64 16 103/66 91 %   10/24/21 1927 -- -- -- -- 95 %       Pain Assessment  Pain Intensity 1: 0 (10/24/21 0403)  Pain Location 1: Back  Pain Intervention(s) 1: Back rub, Ambulation/Increased Activity, Distraction, Emotional support, Family Support, Nurse notified, Repositioned, Rest, Therapeutic presence, Therapeutic touch, Other (comment) (changed brief)  Patient Stated Pain Goal: 0    Ambulating  No    Shift report given to oncoming nurse at the bedside.     Oseas Shah RN

## 2021-10-25 NOTE — PROGRESS NOTES
Hospitalist Progress Note   Admit Date:  10/22/2021 12:31 PM   Name:  Brian Pringle   Age:  80 y.o. Sex:  female  :  1926   MRN:  [id number]   Room:  223/    Presenting Complaint: Fall    Reason(s) for Admission: Rhabdomyolysis [facility] [place] Course & Interval History:   Patient is a 81 y/o female with PMH HTN, HLD, GERD, hypothyroidism, PE (2017), sCHF (EF 30% ), Atrial Fibrillation (on Xarelto), CKD III, DMII, dementia, possible Parkinsons who presented to ED after being found on floor past day after 's were called to perform welfare check. Patient states she fell and was unable to get back up. Daughter indicates multiple falls past week. In ED, patient was febrile to 100.1 with CK of 831. Left Wrist XR with bony fragment projecting off the dorsal margin of the proximal carpal row. Findings could represent degenerative change, though a triquetral fracture could have this appearance as well. Left Hip XR with no acute fracture or dislocation. Subjective (10/25/21):  Patient [place], oriented x4 and alert, eating breakfast, conversational, pleasant. Denies SOB, chest pain. Improvement in upper extremity joint pain. Patient is using bilateral hands to eat breakfast, denies pain in left wrist, no erythema, no swelling, no tenderness.     Assessment & Plan:     Principal Problem:  Rhabdomyolysis, resolved  -Received gentle IVF due to history of sCHF  -Hold K supplement and Lasix  - --> 692 --> 295 --> 136    Active Problems:  Hypertension, stble  -BP well-controlled, continue BB    Hx of Atrial fibrillation (HCC)   -On Xarelto, BB, Amiodarone ->Amio cannot be verified as active rx and bottle not present with meds, hold unless daughter can provide active rx, continue BB for rate control  -Need to discuss risk vs. benefit of [facility] in elderly patient with dementia and increased falls - CHADSVASC 6 with high risk for stroke and HASBLED 2 with mod risk for bleed  -Continuous telemetry, currently NSR HR 72    Hypothyroidism, stable  -Continue Synthroid    Hx of PE (2017)  -On Xarelto  -Stable on RA    Dementia  -Noted, re-orient as needed, is oriented x 4 on rounding, encourage good sleep-wake cycle, minimize nighttime interruptions, promote good sleep    Diabetes Mellitus II  -A1C 6.4 (10/22)  -SSI while inpatient    Chronic Systolic Heart Failure  -EF 30% 2017  -Lasix and K supplement held  -Repeat Echo 10/23 with LVEF normal systolic and diastolic function    Oropharyngeal Dysphagia  -Passed bedside swallow with nurse, SLP evaluated with oropharyngeal dysphagia, recs for soft, bite size diet with thin liquids    History of Recent Falls  -PT/OT with recs for skilled therapy  -Family would like rehab with transition to LTC  -Working with CM on facility choice, referrals sent 10/24    Dispo/Discharge Planning:  Pending rehab placement, patient is medically stable for d/c    Diet:  ADULT DIET Dysphagia - Soft & Bite Sized; 4 carb choices (60 gm/meal);  Low Fat/Low Chol/High Fiber/2 gm Na  DVT PPx: Xarelto  Code status: Full Code    Hospital Problems as of 10/25/2021 Date Reviewed: 6/1/2017        Codes Class Noted - Resolved POA    * (Principal) Rhabdomyolysis ICD-10-CM: O32.59  ICD-9-CM: 728.88  10/22/2021 - Present Unknown        Atrial fibrillation ([facility] [address] ICD-10-CM: I48.91  ICD-9-CM: 427.31  4/4/2017 - Present Yes        Hypertension ICD-10-CM: I10  ICD-9-CM: 401.9  7/1/2015 - Present Yes        Hyperlipidemia ICD-10-CM: E78.5  ICD-9-CM: 272.4  7/1/2015 - Present Yes              Objective:     Patient Vitals for the past 24 hrs:   Temp Pulse Resp BP SpO2   10/25/21 0758 -- -- -- -- 92 %   10/25/21 0615 98 °F (36.7 °C) 65 18 (!) 121/52 92 %   10/25/21 0610 97.9 °F (36.6 °C) 66 18 138/76 94 %   10/25/21 0259 97.7 °F (36.5 °C) 65 16 (!) 143/60 94 %   10/24/21 2336 98 °F (36.7 °C) 64 16 133/60 94 %   10/24/21 2013 98.1 °F (36.7 °C) 64 16 103/66 91 %   10/24/21 1927 -- -- -- -- 95 % 10/24/21 1704 -- 66 -- (!) 105/45 --   10/24/21 1613 98 °F (36.7 °C) 100 16 100/63 95 %   10/24/21 1119 98.4 °F (36.9 °C) 62 17 125/69 92 %     Oxygen Therapy  O2 Sat (%): 92 % (10/25/21 0758)  Pulse via Oximetry: 61 beats per minute (10/25/21 0758)  O2 Device: None (Room air) (10/25/21 0758)    Estimated body mass index is 28.72 kg/m² as calculated from the following:    Height as of this encounter: 5' 2\" (1.575 m). Weight as of this encounter: 71.2 kg (157 lb). Intake/Output Summary (Last 24 hours) at 10/25/2021 0927  Last data filed at 10/25/2021 0600  Gross per 24 hour   Intake 2012 ml   Output 950 ml   Net 1062 ml         Physical Exam:   Blood pressure (!) 121/52, pulse 65, temperature 98 °F (36.7 °C), resp. rate 18, height 5' 2\" (1.575 m), weight 71.2 kg (157 lb), SpO2 92 %. General:    Frail, no acute distress, [place], alert, oriented, pleasant, conversational  Head:  Normocephalic, atraumatic  Eyes:  Sclerae appear normal.  Pupils equally round. ENT:  Nares appear normal, no drainage. Moist oral mucosa. Missing teeth. Neck:  No restricted ROM. Trachea midline   CV:   Irregular rate and rhythm. No m/r/g. No JVD. Lungs:   CTAB. No wheezing, rhonchi. Respirations even, unlabored  Abdomen: Bowel sounds present. Soft, nontender, nondistended. Extremities: No cyanosis or clubbing. No edema. Left wrist without erythema, no swelling, not tender to palpation  Skin:     No rashes and normal coloration. Warm and dry. Neuro:  CN II-XII grossly intact. Sensation intact. A&Ox4, moves all extremities  Psych:  Normal mood and affect.       I have reviewed ordered lab tests and independently visualized imaging below:    Recent Labs:  Recent Results (from the past 48 hour(s))   ECHO ADULT COMPLETE    Collection Time: 10/23/21 11:30 AM   Result Value Ref Range    LV ED Vol A2C 48.70 cm3    LV ED Vol A4C 50.30 cm3    LV ES Vol A2C 21.20 cm3    LV ES Vol A4C 18.70 cm3    IVSd 0.86 0.60 - 0.90 cm LVIDd 3.51 (A) 3.90 - 5.30 cm    LVIDs 2.26 cm    LVOT d 1.70 cm    LVOT Peak Gradient 2.00 mmHg    LVPWd 1.07 (A) 0.60 - 0.90 cm    LV E' Lateral Velocity 6.09 cm/s    LV E' Septal Velocity 3.40 cm/s    Left Atrium Minor Axis 3.04 cm    Left Atrium Major Axis 5.51 cm    LA Area 2C 23.00 cm2    LA Area 4C 15.00 cm2    Aortic Valve Systolic Peak Velocity 886.74 cm/s    AoV PG 6.00 mmHg    Aortic Valve Area by Continuity of Peak Velocity 1.46 cm2    Ao Root D 2.60 cm    AO ASC D 2.90 cm    Mitral Valve E Wave Deceleration Time 225.00 ms    MV A Hany 38.30 cm/s    MV E Hany 102.00 cm/s    RVIDd 2.81 cm    Tapse 2.05 (A) 1.50 - 2.00 cm    TR Max Velocity 279.00 cm/s    Triscuspid Valve Regurgitation Peak Gradient 31.00 mmHg    MV E/A 2.66     LV Mass AL 98.6 67.0 - 162.0 g    LV Mass AL Index 54.5 43.0 - 95.0 g/m2    E/E' lateral 16.75     E/E' septal 30.00     RVSP 34.0 mmHg    E/E' ratio (averaged) 23.37     Est. RA Pressure 3.0 mmHg    LORETTA/BSA Pk Hany 0.8 cm2/m2   GLUCOSE, POC    Collection Time: 10/23/21 12:19 PM   Result Value Ref Range    Glucose (POC) 139 (H) 65 - 100 mg/dL    Performed by Binta Harrington    GLUCOSE, POC    Collection Time: 10/23/21  4:09 PM   Result Value Ref Range    Glucose (POC) 135 (H) 65 - 100 mg/dL    Performed by Ghazala    GLUCOSE, POC    Collection Time: 10/23/21  9:01 PM   Result Value Ref Range    Glucose (POC) 147 (H) 65 - 100 mg/dL    Performed by Jeremy    PLEASE READ & DOCUMENT PPD TEST IN 48 HRS    Collection Time: 10/24/21 12:00 AM   Result Value Ref Range    PPD Negative Negative    mm Induration 0 0 - 5 mm   CBC W/O DIFF    Collection Time: 10/24/21  5:06 AM   Result Value Ref Range    WBC 7.0 4.3 - 11.1 K/uL    RBC 3.29 (L) 4.05 - 5.2 M/uL    HGB 10.9 (L) 11.7 - 15.4 g/dL    HCT 33.8 (L) 35.8 - 46.3 %    .7 (H) 79.6 - 97.8 FL    MCH 33.1 (H) 26.1 - 32.9 PG    MCHC 32.2 31.4 - 35.0 g/dL    RDW 12.0 11.9 - 14.6 %    PLATELET 940 227 - 687 K/uL    MPV 10.7 9.4 - 12.3 FL    ABSOLUTE NRBC 0.00 0.0 - 0.2 K/uL   CK    Collection Time: 10/24/21  5:06 AM   Result Value Ref Range     (H) 21 - 035 U/L   METABOLIC PANEL, COMPREHENSIVE    Collection Time: 10/24/21  5:06 AM   Result Value Ref Range    Sodium 138 136 - 145 mmol/L    Potassium 3.7 3.5 - 5.1 mmol/L    Chloride 105 98 - 107 mmol/L    CO2 28 21 - 32 mmol/L    Anion gap 5 (L) 7 - 16 mmol/L    Glucose 151 (H) 65 - 100 mg/dL    BUN 30 (H) 8 - 23 MG/DL    Creatinine 1.27 (H) 0.6 - 1.0 MG/DL    GFR est AA 50 (L) >60 ml/min/1.73m2    GFR est non-AA 42 (L) >60 ml/min/1.73m2    Calcium 8.6 8.3 - 10.4 MG/DL    Bilirubin, total 0.5 0.2 - 1.1 MG/DL    ALT (SGPT) 34 12 - 65 U/L    AST (SGOT) 38 (H) 15 - 37 U/L    Alk.  phosphatase 57 50 - 136 U/L    Protein, total 6.7 6.3 - 8.2 g/dL    Albumin 2.4 (L) 3.2 - 4.6 g/dL    Globulin 4.3 (H) 2.3 - 3.5 g/dL    A-G Ratio 0.6 (L) 1.2 - 3.5     GLUCOSE, POC    Collection Time: 10/24/21  8:08 AM   Result Value Ref Range    Glucose (POC) 137 (H) 65 - 100 mg/dL    Performed by [facility]    GLUCOSE, POC    Collection Time: 10/24/21 11:58 AM   Result Value Ref Range    Glucose (POC) 201 (H) 65 - 100 mg/dL    Performed by Janet Rubio    GLUCOSE, POC    Collection Time: 10/24/21  4:49 PM   Result Value Ref Range    Glucose (POC) 170 (H) 65 - 100 mg/dL    Performed by [facility]    GLUCOSE, POC    Collection Time: 10/24/21  9:16 PM   Result Value Ref Range    Glucose (POC) 127 (H) 65 - 100 mg/dL    Performed by Geeta    CBC W/O DIFF    Collection Time: 10/25/21  4:43 AM   Result Value Ref Range    WBC 4.9 4.3 - 11.1 K/uL    RBC 3.13 (L) 4.05 - 5.2 M/uL    HGB 10.2 (L) 11.7 - 15.4 g/dL    HCT 31.3 (L) 35.8 - 46.3 %    .0 (H) 79.6 - 97.8 FL    MCH 32.6 26.1 - 32.9 PG    MCHC 32.6 31.4 - 35.0 g/dL    RDW 11.9 11.9 - 14.6 %    PLATELET 922 215 - 730 K/uL    MPV 10.8 9.4 - 12.3 FL    ABSOLUTE NRBC 0.00 0.0 - 0.2 K/uL   METABOLIC PANEL, COMPREHENSIVE Collection Time: 10/25/21  4:43 AM   Result Value Ref Range    Sodium 142 136 - 145 mmol/L    Potassium 4.1 3.5 - 5.1 mmol/L    Chloride 107 98 - 107 mmol/L    CO2 28 21 - 32 mmol/L    Anion gap 7 7 - 16 mmol/L    Glucose 128 (H) 65 - 100 mg/dL    BUN 24 (H) 8 - 23 MG/DL    Creatinine 1.04 (H) 0.6 - 1.0 MG/DL    GFR est AA >60 >60 ml/min/1.73m2    GFR est non-AA 52 (L) >60 ml/min/1.73m2    Calcium 8.9 8.3 - 10.4 MG/DL    Bilirubin, total 0.5 0.2 - 1.1 MG/DL    ALT (SGPT) 28 12 - 65 U/L    AST (SGOT) 31 15 - 37 U/L    Alk. phosphatase 48 (L) 50 - 136 U/L    Protein, total 6.3 6.3 - 8.2 g/dL    Albumin 2.2 (L) 3.2 - 4.6 g/dL    Globulin 4.1 (H) 2.3 - 3.5 g/dL    A-G Ratio 0.5 (L) 1.2 - 3.5     CK    Collection Time: 10/25/21  4:43 AM   Result Value Ref Range     21 - 215 U/L   GLUCOSE, POC    Collection Time: 10/25/21  8:00 AM   Result Value Ref Range    Glucose (POC) 121 (H) 65 - 100 mg/dL    Performed by [facility]        All Micro Results     None          Other Studies:  No results found.     Current Meds:  Current Facility-Administered Medications   Medication Dose Route Frequency    lidocaine 4 % patch 1 Patch  1 Patch TransDERmal Q24H    [Held by provider] amiodarone (CORDARONE) tablet 200 mg  200 mg Oral DAILY    cyanocobalamin tablet 1,000 mcg  1,000 mcg Oral DAILY    docusate sodium (COLACE) capsule 100 mg  100 mg Oral BID    budesonide-formoteroL (SYMBICORT) 160-4.5 mcg/actuation HFA inhaler 2 Puff  2 Puff Inhalation BID    levothyroxine (SYNTHROID) tablet 25 mcg  25 mcg Oral ACB    cetirizine (ZYRTEC) tablet 10 mg  10 mg Oral DAILY    metoprolol succinate (TOPROL-XL) XL tablet 50 mg  50 mg Oral BID    magnesium oxide (MAG-OX) tablet 400 mg  400 mg Oral DAILY    pantoprazole (PROTONIX) tablet 40 mg  40 mg Oral DAILY    rivaroxaban (XARELTO) tablet 15 mg  15 mg Oral DAILY WITH BREAKFAST    senna (SENOKOT) tablet 8.6 mg  1 Tablet Oral DAILY    insulin lispro (HUMALOG) injection SubCUTAneous AC&HS    sodium chloride (NS) flush 5-40 mL  5-40 mL IntraVENous Q8H    sodium chloride (NS) flush 5-40 mL  5-40 mL IntraVENous PRN    acetaminophen (TYLENOL) tablet 650 mg  650 mg Oral Q6H PRN    Or    acetaminophen (TYLENOL) suppository 650 mg  650 mg Rectal Q6H PRN    polyethylene glycol (MIRALAX) packet 17 g  17 g Oral DAILY PRN    ondansetron (ZOFRAN ODT) tablet 4 mg  4 mg Oral Q8H PRN    Or    ondansetron (ZOFRAN) injection 4 mg  4 mg IntraVENous Q6H PRN    nystatin (MYCOSTATIN) 100,000 unit/gram powder   Topical BID    LORazepam (ATIVAN) injection 1 mg  1 mg IntraVENous Q4H PRN     Labs, vital signs, diagnostic testing reviewed. Case discussed with patient, care team, Dr. Ana Ash. Signed:  Marium Frost NP    Part of this note may have been written by using a voice dictation software. The note has been proof read but may still contain some grammatical/other typographical errors.

## 2021-10-25 NOTE — PROGRESS NOTES
Problem: Falls - Risk of  Goal: *Absence of Falls  Description: Document Herlinda Edward Fall Risk and appropriate interventions in the flowsheet.   10/24/2021 2039 by Ana Lilia Gallegos RN  Outcome: Progressing Towards Goal  Note: Fall Risk Interventions:  Mobility Interventions: Patient to call before getting OOB, Bed/chair exit alarm    Mentation Interventions: Adequate sleep, hydration, pain control, Bed/chair exit alarm, Door open when patient unattended, Evaluate medications/consider consulting pharmacy, Increase mobility, More frequent rounding, Reorient patient    Medication Interventions: Bed/chair exit alarm, Evaluate medications/consider consulting pharmacy, Patient to call before getting OOB, Teach patient to arise slowly    Elimination Interventions: Bed/chair exit alarm, Call light in reach    History of Falls Interventions: Bed/chair exit alarm, Door open when patient unattended, Evaluate medications/consider consulting pharmacy      10/24/2021 2039 by Ana Lilia Gallegos RN  Outcome: Progressing Towards Goal  Note: Fall Risk Interventions:  Mobility Interventions: Patient to call before getting OOB, Bed/chair exit alarm    Mentation Interventions: Adequate sleep, hydration, pain control, Bed/chair exit alarm, Door open when patient unattended, Evaluate medications/consider consulting pharmacy, Increase mobility, More frequent rounding, Reorient patient    Medication Interventions: Bed/chair exit alarm, Evaluate medications/consider consulting pharmacy, Patient to call before getting OOB, Teach patient to arise slowly    Elimination Interventions: Bed/chair exit alarm, Call light in reach    History of Falls Interventions: Bed/chair exit alarm, Door open when patient unattended, Evaluate medications/consider consulting pharmacy         Problem: Patient Education: Go to Patient Education Activity  Goal: Patient/Family Education  10/24/2021 2039 by Ana Lilia Gallegos, RN  Outcome: Progressing Towards Goal  10/24/2021 2039 by Kolby Holguin RN  Outcome: Progressing Towards Goal     Problem: Pressure Injury - Risk of  Goal: *Prevention of pressure injury  Description: Document Sergei Scale and appropriate interventions in the flowsheet.   10/24/2021 2039 by Kolby Holguin RN  Outcome: Progressing Towards Goal  Note: Pressure Injury Interventions:  Sensory Interventions: Assess changes in LOC, Keep linens dry and wrinkle-free    Moisture Interventions: Absorbent underpads, Apply protective barrier, creams and emollients, Moisture barrier, Maintain skin hydration (lotion/cream)    Activity Interventions: Increase time out of bed, Pressure redistribution bed/mattress(bed type), PT/OT evaluation    Mobility Interventions: HOB 30 degrees or less, Pressure redistribution bed/mattress (bed type), PT/OT evaluation    Nutrition Interventions: Document food/fluid/supplement intake                  10/24/2021 2039 by Kolby Holguin RN  Outcome: Progressing Towards Goal  Note: Pressure Injury Interventions:  Sensory Interventions: Assess changes in LOC, Keep linens dry and wrinkle-free    Moisture Interventions: Absorbent underpads, Apply protective barrier, creams and emollients, Moisture barrier, Maintain skin hydration (lotion/cream)    Activity Interventions: Increase time out of bed, Pressure redistribution bed/mattress(bed type), PT/OT evaluation    Mobility Interventions: HOB 30 degrees or less, Pressure redistribution bed/mattress (bed type), PT/OT evaluation    Nutrition Interventions: Document food/fluid/supplement intake                     Problem: Patient Education: Go to Patient Education Activity  Goal: Patient/Family Education  10/24/2021 2039 by Kolby Holguin RN  Outcome: Progressing Towards Goal  10/24/2021 2039 by Kolby Holguin RN  Outcome: Progressing Towards Goal     Problem: Patient Education: Go to Patient Education Activity  Goal: Patient/Family Education  10/24/2021 2039 by Em Lehman RN  Outcome: Progressing Towards Goal  10/24/2021 2039 by Em Lehman RN  Outcome: Progressing Towards Goal     Problem: Patient Education: Go to Patient Education Activity  Goal: Patient/Family Education  10/24/2021 2039 by Em Lehman RN  Outcome: Progressing Towards Goal  10/24/2021 2039 by Em Lehman RN  Outcome: Progressing Towards Goal

## 2021-10-25 NOTE — PROGRESS NOTES
ACUTE PHYSICAL THERAPY GOALS:  (Developed with and agreed upon by patient and/or caregiver. )  LTG:  (1.)Ms. Zee Foley will move from supine to sit and sit to supine , scoot up and down and roll side to side in bed with CONTACT GUARD ASSIST within 7 treatment day(s). (2.)Ms. Zee Foley will transfer from bed to chair and chair to bed with SUPERVISION using the least restrictive device within 7 treatment day(s). (3.)Ms. Zee Foley will ambulate with STAND BY ASSIST for 150+ feet with the least restrictive device within 7 treatment day(s). PHYSICAL THERAPY: Daily Note and AM Treatment Day # 2    Elizabeth Toure is a 80 y.o. female   PRIMARY DIAGNOSIS: Rhabdomyolysis  Rhabdomyolysis [M62.82]         ASSESSMENT:     REHAB RECOMMENDATIONS: CURRENT LEVEL OF FUNCTION:  (Most Recently Demonstrated)   Recommendation to date pending progress:  Setting:   Short-term Rehab    Transitioning  to LTC  Equipment:    To Be Determined Bed Mobility:   Not tested  Sit to Stand:   Minimal Assistance  Transfers:   Minimal Assistance  Gait/Mobility:   Minimal Assistance     ASSESSMENT:  Ms. Zee Foley is sitting in the recliner with her daughter at bedside. Patient is agreeable to therapy. Sit to stand with min assist to the walker. Balance forward flexed but stable. Gait x 50 feet. Good session. Making progress towards goals. Cooperative and pleasant to work with. Daughter answers a lot of this writers question as the patient appears to be [facility]. Will continue PT efforts. Discharge plan is for the patient to go to rehab and hopefully transition to LTC. SUBJECTIVE:   Ms. Zee Foley states, \"Hello. \"    SOCIAL HISTORY/ LIVING ENVIRONMENT: see eval  Home Environment: Private residence  # Steps to Enter: 5  One/Two Story Residence: One story  Living Alone: Yes  Support Systems: Parent(s)  OBJECTIVE:     PAIN: VITAL SIGNS: LINES/DRAINS:   Pre Treatment: Pain Screen  Pain Scale 1: Numeric (0 - 10)  Pain Intensity 1: 0  Post Treatment: 0/10   IV  O2 Device: None (Room air)     MOBILITY: I Mod I S SBA CGA Min Mod Max Total  NT x2 Comments:   Bed Mobility    Rolling [] [] [] [] [] [] [] [] [] [x] []    Supine to Sit [] [] [] [] [] [] [] [] [] [x] []    Scooting [] [] [] [] [] [] [] [] [] [x] []    Sit to Supine [] [] [] [] [] [] [] [] [] [x] []    Transfers    Sit to Stand [] [] [] [] [] [x] [] [] [] [] []    Bed to Chair [] [] [] [] [] [] [] [] [] [x] []    Stand to Sit [] [] [] [] [] [x] [] [] [] [] []    I=Independent, Mod I=Modified Independent, S=Supervision, SBA=Standby Assistance, CGA=Contact Guard Assistance,   Min=Minimal Assistance, Mod=Moderate Assistance, Max=Maximal Assistance, Total=Total Assistance, NT=Not Tested    BALANCE: Good Fair+ Fair Fair- Poor NT Comments   Sitting Static [x] [] [] [] [] []    Sitting Dynamic [x] [] [] [] [] []              Standing Static [] [x] [] [] [] []    Standing Dynamic [] [x] [] [] [] []      GAIT: I Mod I S SBA CGA Min Mod Max Total  NT x2 Comments:   Level of Assistance [] [] [] [] [] [x] [] [] [] [] []    Distance 50 feet     DME Rolling Walker and Gait Belt    Gait Quality Slow fifi forward flexed posture    Weightbearing  Status N/A     I=Independent, Mod I=Modified Independent, S=Supervision, SBA=Standby Assistance, CGA=Contact Guard Assistance,   Min=Minimal Assistance, Mod=Moderate Assistance, Max=Maximal Assistance, Total=Total Assistance, NT=Not Tested    PLAN:   FREQUENCY/DURATION: PT Plan of Care: 3 times/week for duration of hospital stay or until stated goals are met, whichever comes first.  TREATMENT:     TREATMENT:   ($$ Therapeutic Activity: 23-37 mins    )  Therapeutic Activity (24 Minutes): Therapeutic activity included Transfer Training, Ambulation on level ground, Sitting balance  and Standing balance to improve functional Mobility, Strength and Activity tolerance.     TREATMENT GRID:  N/A    AFTER TREATMENT POSITION/PRECAUTIONS:  Alarm Activated, Chair, Needs within wm RN notified and Visitors at bedside    INTERDISCIPLINARY COLLABORATION:  RN/PCT and PT/PTA    TOTAL TREATMENT DURATION:  PT Patient Time In/Time Out  Time In: 1146  Time Out: 200    Desi Martínez PTA

## 2021-10-25 NOTE — PROGRESS NOTES
END OF SHIFT NOTE:    INTAKE/OUTPUT  10/24 0701 - 10/25 0700  In: 4356 [P. O.:960; I.V.:1412]  Out: 950 [Urine:950]  Voiding: YES  Catheter: NO  Drain:              Flatus: Patient does have flatus present. Stool:  1 occurrences. Characteristics:  Stool Assessment  Stool Color: Brown  Stool Appearance: Soft, Formed  Stool Amount: Medium  Stool Source/Status: Rectum    Emesis: 0 occurrences. Characteristics:        VITAL SIGNS  Patient Vitals for the past 12 hrs:   Temp Pulse Resp BP SpO2   10/25/21 1928 98.9 °F (37.2 °C) 81 18 122/75 91 %   10/25/21 1815 99.1 °F (37.3 °C) 75 18 (!) 161/62 95 %   10/25/21 0929 -- 78 -- (!) 125/42 --   10/25/21 0758 -- -- -- -- 92 %       Pain Assessment  Pain Intensity 1: 0 (10/25/21 1500)  Pain Location 1: Back  Pain Intervention(s) 1: Back rub, Ambulation/Increased Activity, Distraction, Emotional support, Family Support, Nurse notified, Repositioned, Rest, Therapeutic presence, Therapeutic touch, Other (comment) (changed brief)  Patient Stated Pain Goal: 0    Ambulating  Yes (up to chair)    Shift report given to oncoming nurse at the bedside.     Stella Mckeon, RN

## 2021-10-25 NOTE — PROGRESS NOTES
Wonderful visit with patient and her daughter    Very nice family to be with    We had prayer     Will continue to follow closely

## 2021-10-25 NOTE — PROGRESS NOTES
ACUTE OT GOALS:  (Developed with and agreed upon by patient and/or caregiver.)  1. Patient will complete full body bathing and dressing with min A, additional time and adaptive equipment as needed. 2. Patient will complete toileting with min A.   3. Patient will tolerate 23 minutes of OT treatment with less than 3 rest breaks to increase activity tolerance for ADLs. 4. Patient will complete functional transfers with CGA and adaptive equipment as needed. Timeframe: 7 visits       OCCUPATIONAL THERAPY: Daily Note   OT Treatment Day # 2    Lindell Snellen is a 80 y.o. female   PRIMARY DIAGNOSIS: Rhabdomyolysis  Rhabdomyolysis [M62.82]       Reason for Referral:  S/p fall  ICD-10: Treatment Diagnosis: Generalized Muscle Weakness (M62.81)  Other lack of cordination (R27.8)  Difficulty in walking, Not elsewhere classified (R26.2)  Other abnormalities of gait and mobility (R26.89)  Repeated Falls (R29.6)  History of falling (Z91.81)  INPAT[place] / [place] / Product Type: Medicare /   ASSESSMENT:     REHAB RECOMMENDATIONS:   Recommendation to date pending progress:  Setting:   Short-term Rehab   with possible LTC  Equipment:    To Be Determined   based on progress     PRIOR LEVEL OF FUNCTION:  (Prior to Hospitalization)  INITIAL/CURRENT LEVEL OF FUNCTION:  (Based on most  Recently demonstrated)   Bathing:   Modified Independent sponge bath only  Dressing:   Modified Independent  Feeding/Grooming:   Modified Independent edentulous  Toileting:   Modified Independent wears brief  Functional Mobility:   Modified Independent with rollator Bathing:   Not tested  Dressing:   Not tested  Feeding/Grooming:   Minimal Assistance  Toileting:   Not tested  Functional Mobility:   Contact Guard Assistance     ASSESSMENT:  Ms. Marie Gee is a 79 YO R dominant WF admitted s/p fall in her bathroom and was down for several hours.  Xray negative for fracture but pt has wrist cockup in the room, not wearing it. Pt was supine in bed upon arrival. Pt completed bed mobility with mod A. Pt completed functional mobility in room with CGA using rolling walker. Pt sat in chair and completed grooming with min GILBERTO. Continue POC. SUBJECTIVE:   Ms. Elvis Sherman states, \"I would love to sit in the chair. \"    SOCIAL HISTORY/LIVING ENVIRONMENT: lives alone in [place] home with 5 steps, L HR at entrance, has T/S but only sponge bathes, uses rollator all the time, holds to dtr and uses [place] getting from house to car when they go out, gets MOWs and reheats meals, daughter stays with pt during the day but leaves her at times as well and pt is alone at night. Sleeps in recliner. Wears pull up briefs all the time. No longer driving. Pt manages all her money and business affairs per son. A with IADLs when she lets her daughter help. Very [place]. 2 recent falls.    Home Environment: Private residence  # Steps to Enter: 5  One/Two Story Residence: One story  Living Alone: Yes  Support Systems: Parent(s)    OBJECTIVE:     PAIN: VITAL SIGNS: LINES/DRAINS:   Pre Treatment:    Post Treatment: 4/10 after ambulation and toileting, RN alerted   adult brief  O2 Device: None (Room air)       ACTIVITIES OF DAILY LIVING: I Mod I S SBA CGA Min Mod Max Total NT Comments   BASIC ADLs:              Bathing/ Showering [] [] [] [] [] [] [] [] [] [x]    Toileting [] [] [] [] [] [] [] [] [] [x]    Dressing [] [] [] [] [] [] [] [] [] [x]    Feeding [] [] [] [] [] [] [] [] [] [x]    Grooming [] [] [x] [] [] [x] [] [] [] [] Set up-washing face  Min A-combing hair   Personal Device Care [] [] [] [] [] [] [] [] [] [x]    Functional Mobility [] [] [] [] [x] [] [] [] [] [] x2 with RW   I=Independent, Mod I=Modified Independent, S=Supervision, SBA=Standby Assistance, CGA=Contact Guard Assistance,   Min=Minimal Assistance, Mod=Moderate Assistance, Max=Maximal Assistance, Total=Total Assistance, NT=Not Tested    MOBILITY: I Mod I S SBA CGA Min Mod Max Total  NT x2 Comments:   Supine to sit [] [] [] [] [] [] [x] [] [] [] []    Sit to supine [] [] [] [] [] [] [] [] [] [x] []    Sit to stand [] [] [] [] [] [x] [] [] [] [] []    Bed to chair [] [] [] [] [x] [] [] [] [] [] [] RW   I=Independent, Mod I=Modified Independent, S=Supervision, SBA=Standby Assistance, CGA=Contact Guard Assistance,   Min=Minimal Assistance, Mod=Moderate Assistance, Max=Maximal Assistance, Total=Total Assistance, NT=Not Tested    [address] [place] [address] AM-PAC 6 Clicks   Daily Activity Inpatient Short Form        How much help from another person does the patient currently need. .. Total A Lot A Little None   1. Putting on and taking off regular lower body clothing? [] 1   [x] 2   [] 3   [] 4   2. Bathing (including washing, rinsing, drying)? [] 1   [x] 2   [] 3   [] 4   3. Toileting, which includes using toilet, bedpan or urinal?   [] 1   [x] 2   [] 3   [] 4   4. Putting on and taking off regular upper body clothing? [] 1   [x] 2   [] 3   [] 4   5. Taking care of personal grooming such as brushing teeth? [] 1   [] 2   [x] 3   [] 4   6. Eating meals? [] 1   [] 2   [x] 3   [] 4   © 2007, Trustees of [address] [place] [address], under license to [employer] All rights reserved     Score:  Initial: 14, completed 10/23/2021 Most Recent: X (Date: -- )   Interpretation of Tool:  Represents activities that are increasingly more difficult (i.e. Bed mobility, Transfers, Gait). PLAN:   FREQUENCY/DURATION: OT Plan of Care: 3 times/week for duration of hospital stay or until stated goals are met, whichever comes first.    PROBLEM LIST:   (Skilled intervention is medically necessary to address:)  1. Decreased ADL/Functional Activities  2. Decreased Activity Tolerance  3. Decreased AROM/PROM  4. Decreased Balance  5. Decreased Cognition  6. Decreased Coordination  7. Decreased Gait Ability  8. Decreased Strength  9. Decreased Transfer Abilities  10.  Increased Pain   INTERVENTIONS PLANNED: (Benefits and precautions of occupational therapy have been discussed with the patient.)  1. Self Care Training  2. Therapeutic Activity  3. Therapeutic Exercise/HEP  4. Neuromuscular Re-education  5. Education     TREATMENT:     TREATMENT:   ( $$ Therapeutic Activity: 23-37 mins   )  Therapeutic Activity (15 Minutes): Therapeutic activity included Supine to Sit, Ambulation on level ground, Sitting balance  and Standing balance to improve functional Mobility and Strength. Self Care (10 Minutes): Self care including Grooming to increase independence.     TREATMENT GRID:  N/A    AFTER TREATMENT POSITION/PRECAUTIONS:  Alarm Activated, Chair, Needs within reach, RN notified and Visitors at bedside    INTERDISCIPLINARY COLLABORATION:  RN/PCT and OT/KESSLER    TOTAL TREATMENT DURATION:     Time in: 1010  Time out:1035    CHECO [place]

## 2021-10-25 NOTE — PROGRESS NOTES
Problem: Dysphagia (Adult)  Goal: *Acute Goals and Plan of Care (Insert Text)  Note: ST. Pt. Will tolerate soft/bite size diet with thin liquids with no overt s/sx of aspiration/penetration. MET 10/25/21   2. Pt. Will participate in speech/language/cognitive evaluation with 100% participation. NOT INDICATED  LTG: Pt. Will tolerate least restrictive diet without respiratory decline. MET 10/25/21  =     SPEECH LANGUAGE PATHOLOGY: DYSPHAGIA- Daily Note and Discharge    NAME/AGE/GENDER: Esteban Cramer is a 80 y.o. female  DATE: 10/25/2021  PRIMARY DIAGNOSIS: Rhabdomyolysis [M62.82]      ICD-10: Treatment Diagnosis: R13.12 Dysphagia, Oropharyngeal Phase    RECOMMENDATIONS   DIET:    Soft and Bite-Sized   Thin Liquids    MEDICATIONS: With liquid  Crushed in puree  As tolerated     PRECAUTIONS, MODIFICATIONS, AND STRATEGIES  · Slow rate of intake  · Small bites/sips  · Upright at 90 degrees during meal  · Alternate liquids/solids  · Small sips and bites     RECOMMENDATIONS FOR CONTINUED SPEECH THERAPY:   No further speech therapy indicated at this time. ASSESSMENT   Patient with functional swallow despite missing dentition. She is able to adequately masticate soft solid textures with mildly extended oral prep time. No s/sx of pharyngeal dysphagia with thin liquids by straw. When discussing baseline diet, family reports she eats diet consistent with soft/bite sized textures at baseline including small bites of apples and crackers. They state most food items provided since admission have been similar to baseline diet and she is able to avoid foods that may be too difficult from an oral prep standpoint. Recommend continue soft/bite sized diet and thin liquids. No additional dysphagia treatment indicated as she is at baseline. In regards to cognitive-linguistic abilities, daughter reports patient is \"back to normal\" with improved speech and language abilities compared to admission.  Current plan to d/c to SNF rather than home alone. No indication for cognitive-linguistic evaluation at this time. Please consider re-consult to speech therapy should new concerns arise. EDUCATION:  · Recommendations discussed with Patient, Family, and RN    REHABILITATION POTENTIAL FOR STATED GOALS: Good    PLAN    FREQUENCY/DURATION:   No additional speech therapy indicated at this time as all goals are met. CONTINUATION OF SKILLED SERVICES/MEDICAL NECESSITY:   No additional speech services warranted. SUBJECTIVE   Upright in bedside chair. Alert and conversant. Daughter at bedside- reports patient is back at baseline    Pain: Pain Scale 1: Numeric (0 - 10)  Pain Intensity 1: 0    History of Present Injury/Illness: Ms. Roman Abraham  has a past medical history of Asthma (7/1/2015), Heartburn (7/1/2015), Hyperlipidemia (7/1/2015), Hypertension (7/1/2015), Type 2 diabetes mellitus ([facility] [address] and Urge incontinence (7/1/2015). . She also  has a past surgical history that includes hx cataract removal (2011). PRECAUTIONS/ALLERGIES: Patient has no known allergies. Problem List:  (Impairments causing functional limitations):  1. Dysphagia     Previous Dysphagia: NONE REPORTED  Diet Prior to Evaluation: NPO awaiting speech    Orientation:  Person      OBJECTIVE   Dysphagia treatment: Patient seen for diet tolerance with soft/bite sized diet and thin liquids. She required encouragement to participate in session as she reports not being hungry. Daughter states that patient eats well at home, but does not eat a large amount of food. In regards to textures, patient is able to consume meals provided by [employer] without difficulty. She occasionally has to avoid tougher foods, but will supplement intake with bananas, grapefruit, apples and sandwiches. They report foods provided this admission have been \"just about right\" in regards to oral prep. She consumed 2 crackers with functional oral prep given missing dentition.  Independent use of liquid as liquid wash to clear oral cavity between trials. No s/sx of pharyngeal dysphagia observed. Tool Used: Dysphagia Outcome and Severity Scale (CHELA)    Score Comments   Normal Diet  [] 7 With no strategies or extra time needed   Functional Swallow  [] 6 May have mild oral or pharyngeal delay   Mild Dysphagia  [] 5 Which may require one diet consistency restricted    Mild-Moderate Dysphagia  [x] 4 With 1-2 diet consistencies restricted   Moderate Dysphagia  [] 3 With 2 or more diet consistencies restricted   Moderate-Severe Dysphagia  [] 2 With partial PO strategies (trials with ST only)   Severe Dysphagia  [] 1 With inability to tolerate any PO safely      Score:  Initial: 4 Most Recent: x (Date 10/25/21 )   Interpretation of Tool: The Dysphagia Outcome and Severity Scale (CHELA) is a simple, easy-to-use, 7-point scale developed to systematically rate the functional severity of dysphagia based on objective assessment and make recommendations for diet level, independence level, and type of nutrition. Current Medications:   No current facility-administered medications on file prior to encounter. Current Outpatient Medications on File Prior to Encounter   Medication Sig Dispense Refill    acetaminophen (TYLENOL) 650 mg CR tablet Take 650 mg by mouth every six (6) hours as needed for Pain.  senna (SENNA) 8.6 mg tablet Take 1 Tab by mouth daily.  cyanocobalamin (VITAMIN B-12) 1,000 mcg tablet Take 1,000 mcg by mouth daily.  omeprazole (PRILOSEC) 20 mg capsule Take 20 mg by mouth daily.  rivaroxaban (XARELTO) 15 mg tab tablet Take  by mouth daily.  potassium chloride (KLOR-CON) 20 mEq packet Take 20 mEq by mouth two (2) times a day.  magnesium oxide (MAG-OX) 400 mg tablet Take 400 mg by mouth daily.  levothyroxine (SYNTHROID) 25 mcg tablet Take  by mouth Daily (before breakfast).  furosemide (LASIX) 40 mg tablet Take 1 Tab by mouth daily.  30 Tab 6    metoprolol succinate (TOPROL-XL) 50 mg XL tablet Take 1 Tab by mouth two (2) times a day. 60 Tab 6    fluticasone-vilanterol (BREO ELLIPTA) 100-25 mcg/dose inhaler Take 1 Puff by inhalation daily.  docusate sodium (COLACE) 100 mg capsule Take 1 Cap by mouth two (2) times a day. [place] 0.4-300-250 mg-mcg-mcg tab Take 1 Tab by mouth daily. 90 Tab 3    loratadine (CLARITIN) 10 mg tablet Take 1 Tab by mouth daily. 90 Tab 3    Cholecalciferol, Vitamin D3, 50,000 unit cap Take  by mouth.  multivitamin (ONE A DAY) tablet Take 1 Tab by mouth daily. (Patient not taking: Reported on 10/24/2021)      amiodarone (CORDARONE) 200 mg tablet Take 1 Tab by mouth every twelve (12) hours. (Patient taking differently: Take 200 mg by mouth daily.) 60 Tab 11    sodium chloride (OCEAN) 0.65 % nasal spray 1 [place] by Both Nostrils route as needed for Congestion (Keep at bedside for PRN use. ).  15 mL 11        INTERDISCIPLINARY COLLABORATION: RN    After treatment position/precautions:  · Upright in bed  · Daughter at bedside    Total Treatment Duration:   Time In: 1032  Time Out: [place], [facility] [employer] [facility], [facility], [facility]-SLP  Speech Language Pathologist  Acute Rehabilitation Services  Contact: Rick

## 2021-10-26 LAB
ANION GAP SERPL CALC-SCNC: 4 MMOL/L (ref 7–16)
BUN SERPL-MCNC: 23 MG/DL (ref 8–23)
CALCIUM SERPL-MCNC: 8.9 MG/DL (ref 8.3–10.4)
CHLORIDE SERPL-SCNC: 109 MMOL/L (ref 98–107)
CO2 SERPL-SCNC: 28 MMOL/L (ref 21–32)
CREAT SERPL-MCNC: 0.96 MG/DL (ref 0.6–1)
ERYTHROCYTE [DISTWIDTH] IN BLOOD BY AUTOMATED COUNT: 12 % (ref 11.9–14.6)
FERRITIN SERPL-MCNC: 192 NG/ML (ref 8–388)
FOLATE SERPL-MCNC: 32.5 NG/ML (ref 3.1–17.5)
GLUCOSE BLD STRIP.AUTO-MCNC: 103 MG/DL (ref 65–100)
GLUCOSE BLD STRIP.AUTO-MCNC: 125 MG/DL (ref 65–100)
GLUCOSE BLD STRIP.AUTO-MCNC: 153 MG/DL (ref 65–100)
GLUCOSE BLD STRIP.AUTO-MCNC: 156 MG/DL (ref 65–100)
GLUCOSE SERPL-MCNC: 117 MG/DL (ref 65–100)
HCT VFR BLD AUTO: 31.6 % (ref 35.8–46.3)
HGB BLD-MCNC: 10 G/DL (ref 11.7–15.4)
IRON SATN MFR SERPL: 25 %
IRON SERPL-MCNC: 47 UG/DL (ref 35–150)
MCH RBC QN AUTO: 31.7 PG (ref 26.1–32.9)
MCHC RBC AUTO-ENTMCNC: 31.6 G/DL (ref 31.4–35)
MCV RBC AUTO: 100.3 FL (ref 79.6–97.8)
NRBC # BLD: 0 K/UL (ref 0–0.2)
PLATELET # BLD AUTO: 230 K/UL (ref 150–450)
PMV BLD AUTO: 10 FL (ref 9.4–12.3)
POTASSIUM SERPL-SCNC: 4.1 MMOL/L (ref 3.5–5.1)
RBC # BLD AUTO: 3.15 M/UL (ref 4.05–5.2)
SERVICE CMNT-IMP: ABNORMAL
SODIUM SERPL-SCNC: 141 MMOL/L (ref 136–145)
TIBC SERPL-MCNC: 190 UG/DL (ref 250–450)
VIT B12 SERPL-MCNC: 4582 PG/ML (ref 193–986)
WBC # BLD AUTO: 4.7 K/UL (ref 4.3–11.1)

## 2021-10-26 PROCEDURE — 2709999900 HC NON-CHARGEABLE SUPPLY

## 2021-10-26 PROCEDURE — 94664 DEMO&/EVAL PT USE INHALER: CPT

## 2021-10-26 PROCEDURE — 82962 GLUCOSE BLOOD TEST: CPT

## 2021-10-26 PROCEDURE — 80048 BASIC METABOLIC PNL TOTAL CA: CPT

## 2021-10-26 PROCEDURE — 97530 THERAPEUTIC ACTIVITIES: CPT

## 2021-10-26 PROCEDURE — 82746 ASSAY OF FOLIC ACID SERUM: CPT

## 2021-10-26 PROCEDURE — 85027 COMPLETE CBC AUTOMATED: CPT

## 2021-10-26 PROCEDURE — 82607 VITAMIN B-12: CPT

## 2021-10-26 PROCEDURE — 36415 COLL VENOUS BLD VENIPUNCTURE: CPT

## 2021-10-26 PROCEDURE — 94640 AIRWAY INHALATION TREATMENT: CPT

## 2021-10-26 PROCEDURE — 74011000250 HC RX REV CODE- 250: Performed by: STUDENT IN AN ORGANIZED HEALTH CARE EDUCATION/TRAINING PROGRAM

## 2021-10-26 PROCEDURE — 74011250637 HC RX REV CODE- 250/637: Performed by: FAMILY MEDICINE

## 2021-10-26 PROCEDURE — 74011000250 HC RX REV CODE- 250: Performed by: FAMILY MEDICINE

## 2021-10-26 PROCEDURE — 74011250637 HC RX REV CODE- 250/637: Performed by: EMERGENCY MEDICINE

## 2021-10-26 PROCEDURE — 65270000029 HC RM PRIVATE

## 2021-10-26 PROCEDURE — 74011636637 HC RX REV CODE- 636/637: Performed by: FAMILY MEDICINE

## 2021-10-26 PROCEDURE — 82728 ASSAY OF FERRITIN: CPT

## 2021-10-26 PROCEDURE — 83540 ASSAY OF IRON: CPT

## 2021-10-26 RX ORDER — POLYETHYLENE GLYCOL 3350 17 G/17G
17 POWDER, FOR SOLUTION ORAL DAILY
Status: DISCONTINUED | OUTPATIENT
Start: 2021-10-26 | End: 2021-10-28 | Stop reason: HOSPADM

## 2021-10-26 RX ADMIN — SODIUM CHLORIDE 10 ML: 9 INJECTION INTRAMUSCULAR; INTRAVENOUS; SUBCUTANEOUS at 05:09

## 2021-10-26 RX ADMIN — BUDESONIDE AND FORMOTEROL FUMARATE DIHYDRATE 2 PUFF: 160; 4.5 AEROSOL RESPIRATORY (INHALATION) at 07:57

## 2021-10-26 RX ADMIN — PANTOPRAZOLE SODIUM 40 MG: 40 TABLET, DELAYED RELEASE ORAL at 08:34

## 2021-10-26 RX ADMIN — NYSTATIN: 100000 POWDER TOPICAL at 08:35

## 2021-10-26 RX ADMIN — LEVOTHYROXINE SODIUM 25 MCG: 0.05 TABLET ORAL at 08:35

## 2021-10-26 RX ADMIN — INSULIN LISPRO 2 UNITS: 100 INJECTION, SOLUTION INTRAVENOUS; SUBCUTANEOUS at 21:48

## 2021-10-26 RX ADMIN — Medication 400 MG: at 08:33

## 2021-10-26 RX ADMIN — METOPROLOL SUCCINATE 50 MG: 50 TABLET, EXTENDED RELEASE ORAL at 18:13

## 2021-10-26 RX ADMIN — SODIUM CHLORIDE 10 ML: 9 INJECTION INTRAMUSCULAR; INTRAVENOUS; SUBCUTANEOUS at 21:50

## 2021-10-26 RX ADMIN — CETIRIZINE HYDROCHLORIDE 10 MG: 5 TABLET ORAL at 08:33

## 2021-10-26 RX ADMIN — CYANOCOBALAMIN TAB 1000 MCG 1000 MCG: 1000 TAB at 08:34

## 2021-10-26 RX ADMIN — INSULIN LISPRO 2 UNITS: 100 INJECTION, SOLUTION INTRAVENOUS; SUBCUTANEOUS at 13:10

## 2021-10-26 RX ADMIN — RIVAROXABAN 15 MG: 15 TABLET, FILM COATED ORAL at 08:34

## 2021-10-26 RX ADMIN — SENNOSIDES 8.6 MG: 8.6 TABLET, FILM COATED ORAL at 08:32

## 2021-10-26 RX ADMIN — METOPROLOL SUCCINATE 50 MG: 50 TABLET, EXTENDED RELEASE ORAL at 08:34

## 2021-10-26 RX ADMIN — SODIUM CHLORIDE 10 ML: 9 INJECTION INTRAMUSCULAR; INTRAVENOUS; SUBCUTANEOUS at 13:13

## 2021-10-26 RX ADMIN — DOCUSATE SODIUM 100 MG: 100 CAPSULE, LIQUID FILLED ORAL at 08:33

## 2021-10-26 RX ADMIN — NYSTATIN: 100000 POWDER TOPICAL at 20:48

## 2021-10-26 RX ADMIN — BUDESONIDE AND FORMOTEROL FUMARATE DIHYDRATE 2 PUFF: 160; 4.5 AEROSOL RESPIRATORY (INHALATION) at 22:40

## 2021-10-26 NOTE — PROGRESS NOTES
Hospitalist Progress Note   Admit Date:  10/22/2021 12:31 PM   Name:  Martinez Ross   Age:  80 y.o. Sex:  female  :  1926   MRN:  [id number]   Room:      Presenting Complaint: Fall    Reason(s) for Admission: Rhabdomyolysis [facility] [place] Course & Interval History:   Patient is a 79 y/o female with PMH HTN, HLD, GERD, hypothyroidism, PE (2017), sCHF (EF 30% ), Atrial Fibrillation (on Xarelto), CKD III, DMII, dementia, possible Parkinsons who presented to ED after being found on floor past day after 's were called to perform welfare check. Patient states she fell and was unable to get back up. Daughter indicates multiple falls past week. In ED, patient was febrile to 100.1 with CK of 831. Left Wrist XR with bony fragment projecting off the dorsal margin of the proximal carpal row. Findings could represent degenerative change, though a triquetral fracture could have this appearance as well. Left Hip XR with no acute fracture or dislocation. Subjective (10/26/21): Patient examined at bedside. No acute overnight events. Feeling well overall. Very hard of hearing. Denies being in any pain. Denies fevers/chills, chest pain, shortness of breath, abdominal pain.      Assessment & Plan:     Principal Problem:  Rhabdomyolysis, resolved  -Received gentle IVF due to history of sCHF  -Hold K supplement and Lasix  - --> 692 --> 295 --> 136    Active Problems:  Hypertension, stble  -BP well-controlled, continue BB    Hx of Atrial fibrillation (HCC)   -On Xarelto, BB, Amiodarone ->Amio cannot be verified as active rx and bottle not present with meds, hold unless daughter can provide active rx, continue BB for rate control  -Need to discuss risk vs. benefit of [address] in elderly patient with dementia and increased falls - CHADSVASC 6 with high risk for stroke and HASBLED 2 with mod risk for bleed  -Continuous telemetry, currently NSR HR 72    Hypothyroidism, stable  -Continue Synthroid    Hx of PE (2017)  -On Xarelto  -Stable on RA    Dementia  -Noted, re-orient as needed, is oriented x 4 on rounding, encourage good sleep-wake cycle, minimize nighttime interruptions, promote good sleep    Diabetes Mellitus II  -A1C 6.4 (10/22)  -SSI while inpatient    Chronic Systolic Heart Failure  -EF 30% 2017  -Lasix and K supplement held  -Repeat Echo 10/23 with LVEF normal systolic and diastolic function    Oropharyngeal Dysphagia  -Passed bedside swallow with nurse, SLP evaluated with oropharyngeal dysphagia, recs for soft, bite size diet with thin liquids    History of Recent Falls  -PT/OT with recs for skilled therapy  -Family would like rehab with transition to LTC  -Working with CM on facility choice, referrals sent 10/24    Dispo/Discharge Planning:  Pending rehab placement, patient is medically stable for d/c    Diet:  ADULT DIET Dysphagia - Soft & Bite Sized; 4 carb choices (60 gm/meal);  Low Fat/Low Chol/High Fiber/2 gm Na  DVT PPx: Xarelto  Code status: Full Code    Hospital Problems as of 10/26/2021 Date Reviewed: 6/1/2017        Codes Class Noted - Resolved POA    * (Principal) Rhabdomyolysis ICD-10-CM: G99.75  ICD-9-CM: 728.88  10/22/2021 - Present Unknown        Atrial fibrillation ([facility] ICD-10-CM: I48.91  ICD-9-CM: 427.31  4/4/2017 - Present Yes        Hypertension ICD-10-CM: I10  ICD-9-CM: 401.9  7/1/2015 - Present Yes        Hyperlipidemia ICD-10-CM: E78.5  ICD-9-CM: 272.4  7/1/2015 - Present Yes              Objective:     Patient Vitals for the past 24 hrs:   Temp Pulse Resp BP SpO2   10/26/21 1056 98.7 °F (37.1 °C) 82 18 123/65 91 %   10/26/21 0759 -- -- -- -- 92 %   10/26/21 0705 98.3 °F (36.8 °C) 61 18 (!) 124/59 91 %   10/26/21 0337 98.3 °F (36.8 °C) 61 16 128/61 94 %   10/26/21 0003 98.6 °F (37 °C) 86 18 130/77 94 %   10/25/21 2009 -- -- -- -- 92 %   10/25/21 1928 98.9 °F (37.2 °C) 81 18 122/75 91 %   10/25/21 1815 99.1 °F (37.3 °C) 75 18 (!) 161/62 95 %     Oxygen Therapy  O2 Sat (%): 91 % (10/26/21 1056)  Pulse via Oximetry: 76 beats per minute (10/26/21 0759)  O2 Device: None (Room air) (10/26/21 8509)    Estimated body mass index is 29.01 kg/m² as calculated from the following:    Height as of this encounter: 5' 2\" (1.575 m). Weight as of this encounter: 71.9 kg (158 lb 9.6 oz). Intake/Output Summary (Last 24 hours) at 10/26/2021 1539  Last data filed at 10/26/2021 1409  Gross per 24 hour   Intake 480 ml   Output 1256 ml   Net -776 ml         Physical Exam:   Blood pressure 123/65, pulse 82, temperature 98.7 °F (37.1 °C), resp. rate 18, height 5' 2\" (1.575 m), weight 71.9 kg (158 lb 9.6 oz), SpO2 91 %. General:    Frail, no acute distress, [place], alert, oriented, pleasant, conversational  Head:  Normocephalic, atraumatic  Eyes:  Sclerae appear normal.  Pupils equally round. ENT:  Nares appear normal, no drainage. Moist oral mucosa. Missing teeth. Neck:  No restricted ROM. Trachea midline   CV:   Irregular rate and rhythm. No m/r/g. No JVD. Lungs:   CTAB. No wheezing, rhonchi. Respirations even, unlabored  Abdomen: Bowel sounds present. Soft, nontender, nondistended. Extremities: No cyanosis or clubbing. No edema. Left wrist without erythema, no swelling, not tender to palpation  Skin:     No rashes and normal coloration. Warm and dry. Neuro:  CN II-XII grossly intact. Sensation intact. A&Ox4, moves all extremities  Psych:  Normal mood and affect.       I have reviewed ordered lab tests and independently visualized imaging below:    Recent Labs:  Recent Results (from the past 48 hour(s))   GLUCOSE, POC    Collection Time: 10/24/21  4:49 PM   Result Value Ref Range    Glucose (POC) 170 (H) 65 - 100 mg/dL    Performed by [facility]    GLUCOSE, POC    Collection Time: 10/24/21  9:16 PM   Result Value Ref Range    Glucose (POC) 127 (H) 65 - 100 mg/dL    Performed by Geeta    CBC W/O DIFF    Collection Time: 10/25/21  4:43 AM   Result Value Ref Range    WBC 4.9 4.3 - 11.1 K/uL    RBC 3.13 (L) 4.05 - 5.2 M/uL    HGB 10.2 (L) 11.7 - 15.4 g/dL    HCT 31.3 (L) 35.8 - 46.3 %    .0 (H) 79.6 - 97.8 FL    MCH 32.6 26.1 - 32.9 PG    MCHC 32.6 31.4 - 35.0 g/dL    RDW 11.9 11.9 - 14.6 %    PLATELET 258 947 - 691 K/uL    MPV 10.8 9.4 - 12.3 FL    ABSOLUTE NRBC 0.00 0.0 - 0.2 K/uL   METABOLIC PANEL, COMPREHENSIVE    Collection Time: 10/25/21  4:43 AM   Result Value Ref Range    Sodium 142 136 - 145 mmol/L    Potassium 4.1 3.5 - 5.1 mmol/L    Chloride 107 98 - 107 mmol/L    CO2 28 21 - 32 mmol/L    Anion gap 7 7 - 16 mmol/L    Glucose 128 (H) 65 - 100 mg/dL    BUN 24 (H) 8 - 23 MG/DL    Creatinine 1.04 (H) 0.6 - 1.0 MG/DL    GFR est AA >60 >60 ml/min/1.73m2    GFR est non-AA 52 (L) >60 ml/min/1.73m2    Calcium 8.9 8.3 - 10.4 MG/DL    Bilirubin, total 0.5 0.2 - 1.1 MG/DL    ALT (SGPT) 28 12 - 65 U/L    AST (SGOT) 31 15 - 37 U/L    Alk.  phosphatase 48 (L) 50 - 136 U/L    Protein, total 6.3 6.3 - 8.2 g/dL    Albumin 2.2 (L) 3.2 - 4.6 g/dL    Globulin 4.1 (H) 2.3 - 3.5 g/dL    A-G Ratio 0.5 (L) 1.2 - 3.5     CK    Collection Time: 10/25/21  4:43 AM   Result Value Ref Range     21 - 215 U/L   GLUCOSE, POC    Collection Time: 10/25/21  8:00 AM   Result Value Ref Range    Glucose (POC) 121 (H) 65 - 100 mg/dL    Performed by Soraya Baez    GLUCOSE, POC    Collection Time: 10/25/21  1:17 PM   Result Value Ref Range    Glucose (POC) 160 (H) 65 - 100 mg/dL    Performed by Callum    GLUCOSE, POC    Collection Time: 10/25/21  4:25 PM   Result Value Ref Range    Glucose (POC) 120 (H) 65 - 100 mg/dL    Performed by Soraya Baez    SARS-COV-2    Collection Time: 10/25/21  6:21 PM   Result Value Ref Range    SARS-CoV-2 Please find results under separate order     COVID-19 RAPID TEST    Collection Time: 10/25/21  6:21 PM   Result Value Ref Range    Specimen source Nasopharyngeal      COVID-19 rapid test Not detected NOTD     GLUCOSE, POC    Collection Time: 10/25/21  8:50 PM   Result Value Ref Range    Glucose (POC) 194 (H) 65 - 100 mg/dL    [facility]    METABOLIC PANEL, BASIC    Collection Time: 10/26/21  4:59 AM   Result Value Ref Range    Sodium 141 136 - 145 mmol/L    Potassium 4.1 3.5 - 5.1 mmol/L    Chloride 109 (H) 98 - 107 mmol/L    CO2 28 21 - 32 mmol/L    Anion gap 4 (L) 7 - 16 mmol/L    Glucose 117 (H) 65 - 100 mg/dL    BUN 23 8 - 23 MG/DL    Creatinine 0.96 0.6 - 1.0 MG/DL    GFR est AA >60 >60 ml/min/1.73m2    GFR est non-AA 57 (L) >60 ml/min/1.73m2    Calcium 8.9 8.3 - 10.4 MG/DL   CBC W/O DIFF    Collection Time: 10/26/21  4:59 AM   Result Value Ref Range    WBC 4.7 4.3 - 11.1 K/uL    RBC 3.15 (L) 4.05 - 5.2 M/uL    HGB 10.0 (L) 11.7 - 15.4 g/dL    HCT 31.6 (L) 35.8 - 46.3 %    .3 (H) 79.6 - 97.8 FL    MCH 31.7 26.1 - 32.9 PG    MCHC 31.6 31.4 - 35.0 g/dL    RDW 12.0 11.9 - 14.6 %    PLATELET 636 335 - 335 K/uL    MPV 10.0 9.4 - 12.3 FL    ABSOLUTE NRBC 0.00 0.0 - 0.2 K/uL   FERRITIN    Collection Time: 10/26/21  4:59 AM   Result Value Ref Range    Ferritin 192 8 - 388 NG/ML   TRANSFERRIN SATURATION    Collection Time: 10/26/21  4:59 AM   Result Value Ref Range    Iron 47 35 - 150 ug/dL    TIBC 190 (L) 250 - 450 ug/dL    Transferrin Saturation 25 >20 %   FOLATE    Collection Time: 10/26/21  4:59 AM   Result Value Ref Range    Folate 32.5 (H) 3.1 - 17.5 ng/mL   VITAMIN B12    Collection Time: 10/26/21  4:59 AM   Result Value Ref Range    Vitamin B12 4,582 (H) 193 - 986 pg/mL   GLUCOSE, POC    Collection Time: 10/26/21  7:06 AM   Result Value Ref Range    Glucose (POC) 125 (H) 65 - 100 mg/dL    Performed by [facility]    GLUCOSE, POC    Collection Time: 10/26/21 12:10 PM   Result Value Ref Range    Glucose (POC) 156 (H) 65 - 100 mg/dL    Performed by [facility]        All Micro Results     Procedure Component Value Units Date/Time    COVID-19 RAPID TEST [[id number]] Collected: 10/25/21 1821    Order Status: Completed Specimen: Nasopharyngeal Updated: 10/25/21 1901     Specimen source Nasopharyngeal        COVID-19 rapid test Not detected        Comment:      The specimen is NEGATIVE for SARS-CoV-2, the novel coronavirus associated with COVID-19. A negative result does not rule out COVID-19. This test has been authorized by the FDA under an Emergency Use Authorization (EUA) for use by authorized laboratories. Fact sheet for Healthcare Providers: [employer]  Fact sheet for Patients: AirDroids.nz       Methodology: Isothermal Nucleic Acid Amplification               Other Studies:  No results found.     Current Meds:  Current Facility-Administered Medications   Medication Dose Route Frequency    polyethylene glycol (MIRALAX) packet 17 g  17 g Oral DAILY    lidocaine 4 % patch 1 Patch  1 Patch TransDERmal Q24H    [Held by provider] amiodarone (CORDARONE) tablet 200 mg  200 mg Oral DAILY    cyanocobalamin tablet 1,000 mcg  1,000 mcg Oral DAILY    budesonide-formoteroL (SYMBICORT) 160-4.5 mcg/actuation HFA inhaler 2 Puff  2 Puff Inhalation BID    levothyroxine (SYNTHROID) tablet 25 mcg  25 mcg Oral ACB    cetirizine (ZYRTEC) tablet 10 mg  10 mg Oral DAILY    metoprolol succinate (TOPROL-XL) XL tablet 50 mg  50 mg Oral BID    magnesium oxide (MAG-OX) tablet 400 mg  400 mg Oral DAILY    pantoprazole (PROTONIX) tablet 40 mg  40 mg Oral DAILY    rivaroxaban (XARELTO) tablet 15 mg  15 mg Oral DAILY WITH BREAKFAST    insulin lispro (HUMALOG) injection   SubCUTAneous AC&HS    sodium chloride (NS) flush 5-40 mL  5-40 mL IntraVENous Q8H    sodium chloride (NS) flush 5-40 mL  5-40 mL IntraVENous PRN    acetaminophen (TYLENOL) tablet 650 mg  650 mg Oral Q6H PRN    Or    acetaminophen (TYLENOL) suppository 650 mg  650 mg Rectal Q6H PRN    polyethylene glycol (MIRALAX) packet 17 g  17 g Oral DAILY PRN    ondansetron (ZOFRAN ODT) tablet 4 mg  4 mg Oral Q8H PRN    Or    ondansetron (ZOFRAN) injection 4 mg  4 mg IntraVENous Q6H PRN    nystatin (MYCOSTATIN) 100,000 unit/gram powder   Topical BID    LORazepam (ATIVAN) injection 1 mg  1 mg IntraVENous Q4H PRN     Labs, vital signs, diagnostic testing reviewed. Case discussed with patient, care team, Dr. Cedrick Higuera. Signed: Charisma Gonsalez DO    Part of this note may have been written by using a voice dictation software. The note has been proof read but may still contain some grammatical/other typographical errors.

## 2021-10-26 NOTE — PROGRESS NOTES
Pt and daughter agreeable with bed offer from [facility]. Pt's payer source changed from Medicare to [facility] [address] so now prior authorization is required. Jessica initiated prior authorization this day. No additional CM needs at this time. Will continue to follow and update as needed.

## 2021-10-26 NOTE — PROGRESS NOTES
ACUTE PHYSICAL THERAPY GOALS:  (Developed with and agreed upon by patient and/or caregiver. )  LTG:  (1.)Ms. Jeannine Seymour will move from supine to sit and sit to supine , scoot up and down and roll side to side in bed with CONTACT GUARD ASSIST within 7 treatment day(s). (2.)Ms. Jeannine Seymour will transfer from bed to chair and chair to bed with SUPERVISION using the least restrictive device within 7 treatment day(s). (3.)Ms. Jeannine Seymour will ambulate with STAND BY ASSIST for 150+ feet with the least restrictive device within 7 treatment day(s). PHYSICAL THERAPY: Daily Note and AM Treatment Day # 3    Milana Flores is a 80 y.o. female   PRIMARY DIAGNOSIS: Rhabdomyolysis  Rhabdomyolysis [M62.82]         ASSESSMENT:     REHAB RECOMMENDATIONS: CURRENT LEVEL OF FUNCTION:  (Most Recently Demonstrated)   Recommendation to date pending progress:  Setting:   Short-term Rehab    Transitioning  to LTC  Equipment:    To Be Determined Bed Mobility:   Not tested  Sit to Stand:   Contact Guard Assistance  Transfers:   Contact Guard Assistance  Gait/Mobility:   Contact Guard Assistance     ASSESSMENT:  Ms. Jeannine Seymour is sitting in the recliner. Patient is agreeable to therapy. Sit to stand with contact guard assist to the walker. Balance and posture improved. Gait x 100 feet with rolling walker with one sitting rest break. Daughter wants the patient changed into a house dress she brought in today and the patient was able to stand and assist with changing into it. Good session. Making progress towards goals. Cooperative and pleasant to work with. Daughter arrives  Will continue PT efforts. Discharge plan is for the patient to go to rehab and hopefully transition to LTC. SUBJECTIVE:   Ms. Jeannine Seymour states, \"Good morning. \"    SOCIAL HISTORY/ LIVING ENVIRONMENT: see eval  Home Environment: Private residence  # Steps to Enter: 5  One/Two Story Residence: One story  Living Alone: Yes  Support Systems: Parent(s)  OBJECTIVE:     PAIN: VITAL SIGNS: LINES/DRAINS:   Pre Treatment: Pain Screen  Pain Scale 1: Numeric (0 - 10)  Pain Intensity 1: 0  Post Treatment: 0/10   none  O2 Device: None (Room air)     MOBILITY: I Mod I S SBA CGA Min Mod Max Total  NT x2 Comments:   Bed Mobility    Rolling [] [] [] [] [] [] [] [] [] [x] []    Supine to Sit [] [] [] [] [] [] [] [] [] [x] []    Scooting [] [] [] [] [] [] [] [] [] [x] []    Sit to Supine [] [] [] [] [] [] [] [] [] [x] []    Transfers    Sit to Stand [] [] [] [] [x] [] [] [] [] [] []    Bed to Chair [] [] [] [] [] [] [] [] [] [x] []    Stand to Sit [] [] [] [] [x] [] [] [] [] [] []    I=Independent, Mod I=Modified Independent, S=Supervision, SBA=Standby Assistance, CGA=Contact Guard Assistance,   Min=Minimal Assistance, Mod=Moderate Assistance, Max=Maximal Assistance, Total=Total Assistance, NT=Not Tested    BALANCE: Good Fair+ Fair Fair- Poor NT Comments   Sitting Static [x] [] [] [] [] []    Sitting Dynamic [x] [] [] [] [] []              Standing Static [] [x] [] [] [] []    Standing Dynamic [] [x] [] [] [] []      GAIT: I Mod I S SBA CGA Min Mod Max Total  NT x2 Comments:   Level of Assistance [] [] [] [] [x] [] [] [] [] [] []    Distance 100 feet     DME Rolling Walker and Gait Belt    Gait Quality Slow fifi forward flexed posture    Weightbearing  Status N/A     I=Independent, Mod I=Modified Independent, S=Supervision, SBA=Standby Assistance, CGA=Contact Guard Assistance,   Min=Minimal Assistance, Mod=Moderate Assistance, Max=Maximal Assistance, Total=Total Assistance, NT=Not Tested    PLAN:   FREQUENCY/DURATION: PT Plan of Care: 3 times/week for duration of hospital stay or until stated goals are met, whichever comes first.  TREATMENT:     TREATMENT:   ($$ Therapeutic Activity: 23-37 mins    )  Therapeutic Activity (23 Minutes):  Therapeutic activity included Transfer Training, Ambulation on level ground, Sitting balance  and Standing balance to improve functional Mobility, Strength and Activity tolerance.     TREATMENT GRID:  N/A    AFTER TREATMENT POSITION/PRECAUTIONS:  Alarm Activated, Chair, Needs within reach, RN notified and Visitors at bedside    INTERDISCIPLINARY COLLABORATION:  RN/PCT and PT/PTA    TOTAL TREATMENT DURATION:  PT Patient Time In/Time Out  Time In: 1120  Time Out: [facility], hospitals

## 2021-10-26 NOTE — PROGRESS NOTES
Physician Progress Note      PATIENT:               Skylar Steward  CSN #:                  [id number]  :                       1926  ADMIT DATE:       10/22/2021 12:31 PM  DISCH DATE:  RESPONDING  PROVIDER #:        MK ARIAS DO          QUERY TEXT:    Patient admitted with Rhabdo, noted to have  atrial fibrillation and is maintained on Xarelto. If possible, please document in progress notes and?discharge?summary if you are evaluating and/or treating any of the following: The medical record reflects the following:?  ? Risk Factors: Hx of afib. HTN, DM2, chronic schf, CKD3  ? Clinical Indicators: EKG--sinus tachycardia with premature atrial complexes  Treatment: Xarelto, labs, telemetry,  Options provided:  -- Secondary hypercoagulable state in a patient with atrial fibrillation  -- Other - I will add my own diagnosis  -- Disagree - Not applicable / Not valid  -- Disagree - Clinically unable to determine / Unknown  -- Refer to Clinical Documentation Reviewer    PROVIDER RESPONSE TEXT:    Provider is clinically unable to determine a response to this query.     Query created by: Rain Cantu on 10/26/2021 10:45 AM      Electronically signed by:  Rocky Sweeney DO 10/26/2021 12:48 PM

## 2021-10-27 LAB
GLUCOSE BLD STRIP.AUTO-MCNC: 118 MG/DL (ref 65–100)
GLUCOSE BLD STRIP.AUTO-MCNC: 121 MG/DL (ref 65–100)
GLUCOSE BLD STRIP.AUTO-MCNC: 121 MG/DL (ref 65–100)
GLUCOSE BLD STRIP.AUTO-MCNC: 151 MG/DL (ref 65–100)
SARS-COV-2, COV2: NORMAL
SARS-COV-2, COV2: NOT DETECTED
SERVICE CMNT-IMP: ABNORMAL
SPECIMEN SOURCE, FCOV2M: NORMAL

## 2021-10-27 PROCEDURE — 2709999900 HC NON-CHARGEABLE SUPPLY

## 2021-10-27 PROCEDURE — 82962 GLUCOSE BLOOD TEST: CPT

## 2021-10-27 PROCEDURE — 94640 AIRWAY INHALATION TREATMENT: CPT

## 2021-10-27 PROCEDURE — 74011250637 HC RX REV CODE- 250/637: Performed by: EMERGENCY MEDICINE

## 2021-10-27 PROCEDURE — 74011636637 HC RX REV CODE- 636/637: Performed by: FAMILY MEDICINE

## 2021-10-27 PROCEDURE — 94760 N-INVAS EAR/PLS OXIMETRY 1: CPT

## 2021-10-27 PROCEDURE — 74011250637 HC RX REV CODE- 250/637: Performed by: FAMILY MEDICINE

## 2021-10-27 PROCEDURE — 97535 SELF CARE MNGMENT TRAINING: CPT

## 2021-10-27 PROCEDURE — 97530 THERAPEUTIC ACTIVITIES: CPT

## 2021-10-27 PROCEDURE — 65270000029 HC RM PRIVATE

## 2021-10-27 PROCEDURE — 74011000250 HC RX REV CODE- 250: Performed by: STUDENT IN AN ORGANIZED HEALTH CARE EDUCATION/TRAINING PROGRAM

## 2021-10-27 PROCEDURE — U0005 INFEC AGEN DETEC AMPLI PROBE: HCPCS

## 2021-10-27 RX ADMIN — SODIUM CHLORIDE 10 ML: 9 INJECTION INTRAMUSCULAR; INTRAVENOUS; SUBCUTANEOUS at 14:00

## 2021-10-27 RX ADMIN — SODIUM CHLORIDE 10 ML: 9 INJECTION INTRAMUSCULAR; INTRAVENOUS; SUBCUTANEOUS at 05:22

## 2021-10-27 RX ADMIN — Medication 400 MG: at 08:36

## 2021-10-27 RX ADMIN — METOPROLOL SUCCINATE 50 MG: 50 TABLET, EXTENDED RELEASE ORAL at 17:31

## 2021-10-27 RX ADMIN — PANTOPRAZOLE SODIUM 40 MG: 40 TABLET, DELAYED RELEASE ORAL at 08:37

## 2021-10-27 RX ADMIN — SODIUM CHLORIDE 10 ML: 9 INJECTION INTRAMUSCULAR; INTRAVENOUS; SUBCUTANEOUS at 21:00

## 2021-10-27 RX ADMIN — NYSTATIN: 100000 POWDER TOPICAL at 08:43

## 2021-10-27 RX ADMIN — LEVOTHYROXINE SODIUM 25 MCG: 0.05 TABLET ORAL at 08:37

## 2021-10-27 RX ADMIN — BUDESONIDE AND FORMOTEROL FUMARATE DIHYDRATE 2 PUFF: 160; 4.5 AEROSOL RESPIRATORY (INHALATION) at 21:08

## 2021-10-27 RX ADMIN — CYANOCOBALAMIN TAB 1000 MCG 1000 MCG: 1000 TAB at 08:37

## 2021-10-27 RX ADMIN — BUDESONIDE AND FORMOTEROL FUMARATE DIHYDRATE 2 PUFF: 160; 4.5 AEROSOL RESPIRATORY (INHALATION) at 10:46

## 2021-10-27 RX ADMIN — RIVAROXABAN 15 MG: 15 TABLET, FILM COATED ORAL at 08:36

## 2021-10-27 RX ADMIN — METOPROLOL SUCCINATE 50 MG: 50 TABLET, EXTENDED RELEASE ORAL at 08:36

## 2021-10-27 RX ADMIN — CETIRIZINE HYDROCHLORIDE 10 MG: 5 TABLET ORAL at 08:36

## 2021-10-27 RX ADMIN — NYSTATIN: 100000 POWDER TOPICAL at 21:00

## 2021-10-27 RX ADMIN — INSULIN LISPRO 2 UNITS: 100 INJECTION, SOLUTION INTRAVENOUS; SUBCUTANEOUS at 17:31

## 2021-10-27 NOTE — PROGRESS NOTES
END OF SHIFT NOTE:    INTAKE/OUTPUT  10/25 0701 - 10/26 0700  In: -   Out: 1200 [Urine:1200]  Voiding: YES  Catheter: NO  Drain:   External Urinary Catheter 10/26/21 (Active)               Flatus: Patient does have flatus present. Stool:  0 occurrences. Characteristics:      Emesis: 0 occurrences. Characteristics:        VITAL SIGNS  Patient Vitals for the past 12 hrs:   Temp Pulse Resp BP SpO2   10/26/21 1940 98.8 °F (37.1 °C) 76 21 120/66 94 %   10/26/21 1815 -- -- -- 135/74 --   10/26/21 1540 98.6 °F (37 °C) 70 20 (!) 102/56 92 %   10/26/21 1056 98.7 °F (37.1 °C) 82 18 123/65 91 %       Pain Assessment  Pain Intensity 1: 0 (10/26/21 1205)  Pain Location 1: Back  Pain Intervention(s) 1: Back rub, Ambulation/Increased Activity, Distraction, Emotional support, Family Support, Nurse notified, Repositioned, Rest, Therapeutic presence, Therapeutic touch, Other (comment) (changed brief)  Patient Stated Pain Goal: 0    Ambulating  Yes    Shift report given to oncoming nurse at the bedside.     Krystal Landry RN

## 2021-10-27 NOTE — PROGRESS NOTES
END OF SHIFT NOTE:    INTAKE/OUTPUT  10/26 0701 - 10/27 0700  In: 840 [P.O.:840]  Out: 656 [Urine:655]  Voiding: YES  Catheter: NO  Drain:              Flatus: Patient does not have flatus present. Stool:  0 occurrences. Characteristics:    Emesis: 0 occurrences. Characteristics:        VITAL SIGNS  Patient Vitals for the past 12 hrs:   Temp Pulse Resp BP SpO2   10/27/21 1456 98.2 °F (36.8 °C) 69 20 117/62 90 %   10/27/21 1149 97.8 °F (36.6 °C) 73 20 124/67 92 %   10/27/21 1052 -- -- -- -- 93 %   10/27/21 0700 98.6 °F (37 °C) (!) 57 18 132/65 92 %       Pain Assessment  Pain Intensity 1: 0 (10/27/21 1500)  Pain Location 1: Back  Pain Intervention(s) 1: Back rub, Ambulation/Increased Activity, Distraction, Emotional support, Family Support, Nurse notified, Repositioned, Rest, Therapeutic presence, Therapeutic touch, Other (comment) (changed brief)  Patient Stated Pain Goal: 0    Ambulating  Yes    Shift report given to oncoming nurse at the bedside.     Pennie Aguilera RN

## 2021-10-27 NOTE — PROGRESS NOTES
ACUTE OT GOALS:  (Developed with and agreed upon by patient and/or caregiver.)  1. Patient will complete full body bathing and dressing with min A, additional time and adaptive equipment as needed. 2. Patient will complete toileting with min A.   3. Patient will tolerate 23 minutes of OT treatment with less than 3 rest breaks to increase activity tolerance for ADLs. 4. Patient will complete functional transfers with CGA and adaptive equipment as needed.      Timeframe: 7 visits     OCCUPATIONAL THERAPY: Daily Note OT Treatment Day # 3    Stanley Miller is a 80 y.o. female   PRIMARY DIAGNOSIS: Rhabdomyolysis  Rhabdomyolysis [M62.82]       Payor: [employer] / [place] [employer] / Product Type: Managed Care Medicare /   ASSESSMENT:     REHAB RECOMMENDATIONS: CURRENT LEVEL OF FUNCTION:  (Most Recently Demonstrated)   Recommendation to date pending progress:  Setting:   Short-term Rehab  Equipment:    To Be Determined Bathing:   Contact Guard Assistance  Dressing:   Minimal Assistance  Feeding/Grooming:   Contact Guard Assistance  Toileting:   Maximal Assistance  Functional Mobility:   Contact Guard Assistance     ASSESSMENT:  Ms. Jovany Troncoso is progressing well towards OT goals. Today, pt was received supine in bed (soiled in urine). Completed bed mobility Tabitha. Tabitha for UB dressing. CGA for UB bathing and grooming tasks sitting EOB. Sit>stand and ambulation to chair with RW CGA. MaxA for toileting. Pt very corporative  and appreciative for therapy services today. Ms. Jovany Troncoso continues to demonstrate overall deficits in strength, balance, activity tolerance, and ADL performance. Continue OT efforts and POC in order to address functional deficits and OT goals stated above. SUBJECTIVE:   Ms. Jovany Troncoso states, \"I need my hairbrush. \"    SOCIAL HISTORY/LIVING ENVIRONMENT:   Home Environment: Private residence  # Steps to Enter: 5  One/Two Story Residence: One story  Living Alone:  Yes  Support Systems: Parent(s)    OBJECTIVE:     PAIN: VITAL SIGNS: LINES/DRAINS:   Pre Treatment: Pain Screen  Pain Scale 1: Numeric (0 - 10)  Pain Intensity 1: 0  Post Treatment: no change    none  O2 Device: None (Room air)     ACTIVITIES OF DAILY LIVING: I Mod I S SBA CGA Min Mod Max Total NT Comments   BASIC ADLs:              Bathing/ Showering [] [] [] [] [x] [] [] [] [] [] UB sitting EOB   Toileting [] [] [] [] [] [] [] [x] [] [] Brief management and young-care in standing    Dressing [] [] [] [] [] [x] [] [] [] [] Doffed and donned gown    Feeding [] [] [] [] [] [] [] [] [] [x]    Grooming [] [] [] [] [x] [] [] [] [] [] Washed face and combed hair sitting EOB, Tabitha for thoroughness of hair brushing due to knots    Personal Device Care [] [] [] [] [] [] [] [] [] [x]    Functional Mobility [] [] [] [] [x] [] [] [] [] [] RW   I=Independent, Mod I=Modified Independent, S=Supervision, SBA=Standby Assistance, CGA=Contact Guard Assistance,   Min=Minimal Assistance, Mod=Moderate Assistance, Max=Maximal Assistance, Total=Total Assistance, NT=Not Tested    MOBILITY: I Mod I S SBA CGA Min Mod Max Total  NT x2 Comments:   Supine to sit [] [] [] [] [] [x] [] [] [] [] []    Sit to supine [] [] [] [] [] [] [] [] [] [x] [] Left sitting in the chair    Sit to stand [] [] [] [] [x] [] [] [] [] [] []    Bed to chair [] [] [] [] [x] [] [] [] [] [] [] RW   I=Independent, Mod I=Modified Independent, S=Supervision, SBA=Standby Assistance, CGA=Contact Guard Assistance,   Min=Minimal Assistance, Mod=Moderate Assistance, Max=Maximal Assistance, Total=Total Assistance, NT=Not Tested    BALANCE: Good Fair+ Fair Fair- Poor NT Comments   Sitting Static [] [x] [] [] [] []    Sitting Dynamic [] [x] [] [] [] []              Standing Static [] [x] [] [] [] []    Standing Dynamic [] [x] [] [] [] []      PLAN:   FREQUENCY/DURATION: OT Plan of Care: 3 times/week for duration of hospital stay or until stated goals are met, whichever comes first.    TREATMENT:   TREATMENT:   ($$ Self Care/Home Management: 8-22 mins$$ Therapeutic Activity: 8-22 mins   )  Therapeutic Activity (10 Minutes): Therapeutic activity included Supine to Sit, Transfer Training, Ambulation on level ground, Sitting balance  and Standing balance to improve functional Mobility, Strength and Activity tolerance. Self Care (17 Minutes): Self care including Upper Body Bathing, Toileting, Upper Body Dressing and Grooming to increase independence and decrease level of assistance required.     TREATMENT GRID:  N/A    AFTER TREATMENT POSITION/PRECAUTIONS:  Alarm Activated, Chair, Needs within reach and RN notified    INTERDISCIPLINARY COLLABORATION:  RN/PCT and OT/KESSLER    TOTAL TREATMENT DURATION:  OT Patient Time In/Time Out  Time In: 0933  Time Out: [address] [facility],

## 2021-10-27 NOTE — PROGRESS NOTES
ACUTE PHYSICAL THERAPY GOALS:  (Developed with and agreed upon by patient and/or caregiver. )  LTG:  (1.)Ms. Paris Angela will move from supine to sit and sit to supine , scoot up and down and roll side to side in bed with CONTACT GUARD ASSIST within 7 treatment day(s). (2.)Ms. Paris Angela will transfer from bed to chair and chair to bed with SUPERVISION using the least restrictive device within 7 treatment day(s). (3.)Ms. Paris Angela will ambulate with STAND BY ASSIST for 150+ feet with the least restrictive device within 7 treatment day(s). PHYSICAL THERAPY: Daily Note and PM Treatment Day # 4    Nieves Mckeon is a 80 y.o. female   PRIMARY DIAGNOSIS: Rhabdomyolysis  Rhabdomyolysis [M62.82]         ASSESSMENT:     REHAB RECOMMENDATIONS: CURRENT LEVEL OF FUNCTION:  (Most Recently Demonstrated)   Recommendation to date pending progress:  Setting:   Short-term Rehab    Transitioning  to LTC  Equipment:    To Be Determined Bed Mobility:   Contact Guard Assistance  Sit to Stand:   Contact Guard Assistance  Transfers:   Contact Guard Assistance  Gait/Mobility:   Contact Guard Assistance     ASSESSMENT:  Ms. Paris nAgela is sitting in the recliner. Patient is agreeable to therapy, but wants to go to bed. Sit to stand with contact guard assist to the walker. Balance and posture improved. Gait x 15 feet with rolling walker within the room, assisted to bed. Did assist with bed mobility,positioned for comfort with needs within reach. Cooperative and pleasant to work with. Needs assist with mobility. Will continue PT efforts. Discharge plan is for the patient to go to rehab and hopefully transition to LTC.        SUBJECTIVE:   Ms. Paris Angela states, \"Hey\"    SOCIAL HISTORY/ LIVING ENVIRONMENT: see eval  Home Environment: Private residence  # Steps to Enter: 5  One/Two Story Residence: One story  Living Alone: Yes  Support Systems: Parent(s)  OBJECTIVE:     PAIN: VITAL SIGNS: LINES/DRAINS:   Pre Treatment: Pain Screen  Pain Scale 1: Numeric (0 - 10)  Pain Intensity 1: 0  Post Treatment: 0/10   none  O2 Device: None (Room air)     MOBILITY: I Mod I S SBA CGA Min Mod Max Total  NT x2 Comments:   Bed Mobility    Rolling [] [] [] [] [] [] [] [] [] [x] []    Supine to Sit [] [] [] [] [] [] [] [] [] [x] []    Scooting [] [] [] [] [] [] [] [] [] [x] []    Sit to Supine [] [] [] [] [] [] [] [] [] [x] []    Transfers    Sit to Stand [] [] [] [] [x] [] [] [] [] [] []    Bed to Chair [] [] [] [] [] [] [] [] [] [x] []    Stand to Sit [] [] [] [] [x] [] [] [] [] [] []    I=Independent, Mod I=Modified Independent, S=Supervision, SBA=Standby Assistance, CGA=Contact Guard Assistance,   Min=Minimal Assistance, Mod=Moderate Assistance, Max=Maximal Assistance, Total=Total Assistance, NT=Not Tested    BALANCE: Good Fair+ Fair Fair- Poor NT Comments   Sitting Static [x] [] [] [] [] []    Sitting Dynamic [x] [] [] [] [] []              Standing Static [] [x] [] [] [] []    Standing Dynamic [] [x] [] [] [] []      GAIT: I Mod I S SBA CGA Min Mod Max Total  NT x2 Comments:   Level of Assistance [] [] [] [] [x] [] [] [] [] [] []    Distance 15 feet     DME Rolling Walker and Gait Belt    Gait Quality Slow fifi forward flexed posture    Weightbearing  Status N/A     I=Independent, Mod I=Modified Independent, S=Supervision, SBA=Standby Assistance, CGA=Contact Guard Assistance,   Min=Minimal Assistance, Mod=Moderate Assistance, Max=Maximal Assistance, Total=Total Assistance, NT=Not Tested    PLAN:   FREQUENCY/DURATION: PT Plan of Care: 3 times/week for duration of hospital stay or until stated goals are met, whichever comes first.  TREATMENT:     TREATMENT:   ($$ Therapeutic Activity: 8-22 mins    )  Therapeutic Activity (10 Minutes): Therapeutic activity included Sit to Supine, Transfer Training, Ambulation on level ground, Sitting balance  and Standing balance to improve functional Mobility, Strength and Activity tolerance.     TREATMENT GRID:  N/A    AFTER TREATMENT POSITION/PRECAUTIONS:  Alarm Activated, Bed, Needs within reach and RN notified    INTERDISCIPLINARY COLLABORATION:  RN/PCT and PT/PTA    TOTAL TREATMENT DURATION:  PT Patient Time In/Time Out  Time In: 1417  Time Out: 18    Angelia Arzolaard-Choice, PTA

## 2021-10-27 NOTE — PROGRESS NOTES
Hospitalist Progress Note   Admit Date:  10/22/2021 12:31 PM   Name:  Edilma Garcia   Age:  80 y.o. Sex:  female  :  1926   MRN:  [id number]   Room:      Presenting Complaint: Fall    Reason(s) for Admission: Rhabdomyolysis [facility] [place] Course & Interval History:   Patient is a 81 y/o female with PMH HTN, HLD, GERD, hypothyroidism, PE (2017), sCHF (EF 30% ), Atrial Fibrillation (on Xarelto), CKD III, DMII, dementia, possible Parkinsons who presented to ED after being found on floor past day after 's were called to perform welfare check. Patient states she fell and was unable to get back up. Daughter indicates multiple falls past week. In ED, patient was febrile to 100.1 with CK of 831. Left Wrist XR with bony fragment projecting off the dorsal margin of the proximal carpal row. Findings could represent degenerative change, though a triquetral fracture could have this appearance as well. Left Hip XR with no acute fracture or dislocation. Subjective (10/27/21): Patient examined at bedside. No acute overnight events. Feeling well overall. Very hard of hearing. Denies being in any pain. Denies fevers/chills, chest pain, shortness of breath, abdominal pain.      Assessment & Plan:     Principal Problem:  Rhabdomyolysis, resolved  -Received gentle IVF due to history of sCHF  -Hold K supplement and Lasix  - --> 692 --> 295 --> 136    Active Problems:  Hypertension, stble  -BP well-controlled, continue BB    Hx of Atrial fibrillation (HCC)   -On Xarelto, BB, Amiodarone ->Amio cannot be verified as active rx and bottle not present with meds, hold unless daughter can provide active rx, continue BB for rate control  -Need to discuss risk vs. benefit of [address] in elderly patient with dementia and increased falls - CHADSVASC 6 with high risk for stroke and HASBLED 2 with mod risk for bleed  -Continuous telemetry, currently NSR HR 72    Hypothyroidism, stable  -Continue Synthroid    Hx of PE (2017)  -On Xarelto  -Stable on RA    Dementia  -Noted, re-orient as needed, is oriented x 4 on rounding, encourage good sleep-wake cycle, minimize nighttime interruptions, promote good sleep    Diabetes Mellitus II  -A1C 6.4 (10/22)  -SSI while inpatient    Chronic Systolic Heart Failure  -EF 30% 2017  -Lasix and K supplement held  -Repeat Echo 10/23 with LVEF normal systolic and diastolic function    Oropharyngeal Dysphagia  -Passed bedside swallow with nurse, SLP evaluated with oropharyngeal dysphagia, recs for soft, bite size diet with thin liquids    History of Recent Falls  -PT/OT with recs for skilled therapy  -Family would like rehab with transition to LTC  -Working with CM on facility choice, referrals sent 10/24    Dispo/Discharge Planning:  Pending rehab placement, patient is medically stable for d/c    Diet:  ADULT DIET Dysphagia - Soft & Bite Sized; 4 carb choices (60 gm/meal);  Low Fat/Low Chol/High Fiber/2 gm Na  DVT PPx: Xarelto  Code status: Full Code    Hospital Problems as of 10/27/2021 Date Reviewed: 6/1/2017        Codes Class Noted - Resolved POA    * (Principal) Rhabdomyolysis ICD-10-CM: S07.80  ICD-9-CM: 728.88  10/22/2021 - Present Unknown        [facility] fibrillation ([facility] ICD-10-CM: I48.91  ICD-9-CM: 427.31  4/4/2017 - Present Yes        Hypertension ICD-10-CM: I10  ICD-9-CM: 401.9  7/1/2015 - Present Yes        Hyperlipidemia ICD-10-CM: E78.5  ICD-9-CM: 272.4  7/1/2015 - Present Yes              Objective:     Patient Vitals for the past 24 hrs:   Temp Pulse Resp BP SpO2   10/27/21 1149 97.8 °F (36.6 °C) 73 20 124/67 92 %   10/27/21 1052 -- -- -- -- 93 %   10/27/21 0700 98.6 °F (37 °C) (!) 57 18 132/65 92 %   10/27/21 0424 98 °F (36.7 °C) 66 21 124/66 94 %   10/26/21 2350 98.1 °F (36.7 °C) 70 20 116/60 92 %   10/26/21 2245 -- -- -- -- 90 %   10/26/21 1940 98.8 °F (37.1 °C) 76 21 120/66 94 %   10/26/21 1815 -- -- -- 135/74 --   10/26/21 1540 98.6 °F (37 °C) 70 20 (!) 102/56 92 %     Oxygen Therapy  O2 Sat (%): 92 % (10/27/21 1149)  Pulse via Oximetry: 63 beats per minute (10/27/21 1052)  O2 Device: None (Room air) (10/27/21 1052)    Estimated body mass index is 29.01 kg/m² as calculated from the following:    Height as of this encounter: 5' 2\" (1.575 m). Weight as of this encounter: 71.9 kg (158 lb 9.6 oz). Intake/Output Summary (Last 24 hours) at 10/27/2021 1350  Last data filed at 10/27/2021 1308  Gross per 24 hour   Intake 1200 ml   Output 200 ml   Net 1000 ml         Physical Exam:   Blood pressure 124/67, pulse 73, temperature 97.8 °F (36.6 °C), resp. rate 20, height 5' 2\" (1.575 m), weight 71.9 kg (158 lb 9.6 oz), SpO2 92 %. General:    Frail, no acute distress, [place], alert, oriented, pleasant, conversational  Head:  Normocephalic, atraumatic  Eyes:  Sclerae appear normal.  Pupils equally round. ENT:  Nares appear normal, no drainage. Moist oral mucosa. Missing teeth. Neck:  No restricted ROM. Trachea midline   CV:   Irregular rate and rhythm. No JVD. Lungs:   CTAB. No wheezing, rhonchi. Respirations even, unlabored  Abdomen: Bowel sounds present. Soft, nontender, nondistended. Extremities: No cyanosis or clubbing. No edema. Left wrist without erythema, no swelling, not tender to palpation  Skin:     No rashes and normal coloration. Warm and dry. Neuro:  CN II-XII grossly intact. Sensation intact. A&Ox4, moves all extremities  Psych:  Normal mood and affect.       I have reviewed ordered lab tests and independently visualized imaging below:    Recent Labs:  Recent Results (from the past 48 hour(s))   GLUCOSE, POC    Collection Time: 10/25/21  4:25 PM   Result Value Ref Range    Glucose (POC) 120 (H) 65 - 100 mg/dL    Performed by Suyapa Velez    SARS-COV-2    Collection Time: 10/25/21  6:21 PM   Result Value Ref Range    SARS-CoV-2 Please find results under separate order     COVID-19 RAPID TEST    Collection Time: 10/25/21  6:21 PM   Result Value Ref Range    Specimen source Nasopharyngeal      COVID-19 rapid test Not detected NOTD     GLUCOSE, POC    Collection Time: 10/25/21  8:50 PM   Result Value Ref Range    Glucose (POC) 194 (H) 65 - 100 mg/dL    Performed by [facility]    METABOLIC PANEL, BASIC    Collection Time: 10/26/21  4:59 AM   Result Value Ref Range    Sodium 141 136 - 145 mmol/L    Potassium 4.1 3.5 - 5.1 mmol/L    Chloride 109 (H) 98 - 107 mmol/L    CO2 28 21 - 32 mmol/L    Anion gap 4 (L) 7 - 16 mmol/L    Glucose 117 (H) 65 - 100 mg/dL    BUN 23 8 - 23 MG/DL    Creatinine 0.96 0.6 - 1.0 MG/DL    GFR est AA >60 >60 ml/min/1.73m2    GFR est non-AA 57 (L) >60 ml/min/1.73m2    Calcium 8.9 8.3 - 10.4 MG/DL   CBC W/O DIFF    Collection Time: 10/26/21  4:59 AM   Result Value Ref Range    WBC 4.7 4.3 - 11.1 K/uL    RBC 3.15 (L) 4.05 - 5.2 M/uL    HGB 10.0 (L) 11.7 - 15.4 g/dL    HCT 31.6 (L) 35.8 - 46.3 %    .3 (H) 79.6 - 97.8 FL    MCH 31.7 26.1 - 32.9 PG    MCHC 31.6 31.4 - 35.0 g/dL    RDW 12.0 11.9 - 14.6 %    PLATELET 367 605 - 086 K/uL    MPV 10.0 9.4 - 12.3 FL    ABSOLUTE NRBC 0.00 0.0 - 0.2 K/uL   FERRITIN    Collection Time: 10/26/21  4:59 AM   Result Value Ref Range    Ferritin 192 8 - 388 NG/ML   TRANSFERRIN SATURATION    Collection Time: 10/26/21  4:59 AM   Result Value Ref Range    Iron 47 35 - 150 ug/dL    TIBC 190 (L) 250 - 450 ug/dL    Transferrin Saturation 25 >20 %   FOLATE    Collection Time: 10/26/21  4:59 AM   Result Value Ref Range    Folate 32.5 (H) 3.1 - 17.5 ng/mL   VITAMIN B12    Collection Time: 10/26/21  4:59 AM   Result Value Ref Range    Vitamin B12 4,582 (H) 193 - 986 pg/mL   GLUCOSE, POC    Collection Time: 10/26/21  7:06 AM   Result Value Ref Range    Glucose (POC) 125 (H) 65 - 100 mg/dL    Performed by [employer] [employer]    GLUCOSE, POC    Collection Time: 10/26/21 12:10 PM   Result Value Ref Range    Glucose (POC) 156 (H) 65 - 100 mg/dL    Performed by [employer]    GLUCOSE, POC    Collection Time: 10/26/21  4:43 PM   Result Value Ref Range    Glucose (POC) 103 (H) 65 - 100 mg/dL    Performed by [facility]    GLUCOSE, POC    Collection Time: 10/26/21  9:17 PM   Result Value Ref Range    Glucose (POC) 153 (H) 65 - 100 mg/dL    Performed by Jeremy    GLUCOSE, POC    Collection Time: 10/27/21  7:03 AM   Result Value Ref Range    Glucose (POC) 118 (H) 65 - 100 mg/dL    Performed by [facility]    GLUCOSE, POC    Collection Time: 10/27/21 12:26 PM   Result Value Ref Range    Glucose (POC) 121 (H) 65 - 100 mg/dL    Performed by [employer]        All Micro Results     Procedure Component Value Units Date/Time    COVID-19 RAPID TEST [[id number]] Collected: 10/25/21 1821    Order Status: Completed Specimen: Nasopharyngeal Updated: 10/25/21 1901     Specimen source Nasopharyngeal        COVID-19 rapid test Not detected        Comment:      The specimen is NEGATIVE for SARS-CoV-2, the novel coronavirus associated with COVID-19. A negative result does not rule out COVID-19. This test has been authorized by the FDA under an Emergency Use Authorization (EUA) for use by authorized laboratories. Fact sheet for Healthcare Providers: ConventionUpdate.co.nz  Fact sheet for Patients: ConventionUpdate.co.nz       Methodology: Isothermal Nucleic Acid Amplification               Other Studies:  No results found.     Current Meds:  Current Facility-Administered Medications   Medication Dose Route Frequency    polyethylene glycol (MIRALAX) packet 17 g  17 g Oral DAILY    lidocaine 4 % patch 1 Patch  1 Patch TransDERmal Q24H    [Held by provider] amiodarone (CORDARONE) tablet 200 mg  200 mg Oral DAILY    cyanocobalamin tablet 1,000 mcg  1,000 mcg Oral DAILY    budesonide-formoteroL (SYMBICORT) 160-4.5 mcg/actuation HFA inhaler 2 Puff  2 Puff Inhalation BID    levothyroxine (SYNTHROID) tablet 25 mcg  25 mcg Oral ACB    cetirizine (ZYRTEC) tablet 10 mg  10 mg Oral DAILY    metoprolol succinate (TOPROL-XL) XL tablet 50 mg  50 mg Oral BID    magnesium oxide (MAG-OX) tablet 400 mg  400 mg Oral DAILY    pantoprazole (PROTONIX) tablet 40 mg  40 mg Oral DAILY    rivaroxaban (XARELTO) tablet 15 mg  15 mg Oral DAILY WITH BREAKFAST    insulin lispro (HUMALOG) injection   SubCUTAneous AC&HS    sodium chloride (NS) flush 5-40 mL  5-40 mL IntraVENous Q8H    sodium chloride (NS) flush 5-40 mL  5-40 mL IntraVENous PRN    acetaminophen (TYLENOL) tablet 650 mg  650 mg Oral Q6H PRN    Or    acetaminophen (TYLENOL) suppository 650 mg  650 mg Rectal Q6H PRN    polyethylene glycol (MIRALAX) packet 17 g  17 g Oral DAILY PRN    ondansetron (ZOFRAN ODT) tablet 4 mg  4 mg Oral Q8H PRN    Or    ondansetron (ZOFRAN) injection 4 mg  4 mg IntraVENous Q6H PRN    nystatin (MYCOSTATIN) 100,000 unit/gram powder   Topical BID    LORazepam (ATIVAN) injection 1 mg  1 mg IntraVENous Q4H PRN     Labs, vital signs, diagnostic testing reviewed. Case discussed with patient, care team, Dr. Florinda Sherman. Signed: Mikey Esquivel DO    Part of this note may have been written by using a voice dictation software. The note has been proof read but may still contain some grammatical/other typographical errors.

## 2021-10-28 VITALS
HEART RATE: 64 BPM | SYSTOLIC BLOOD PRESSURE: 128 MMHG | HEIGHT: 62 IN | OXYGEN SATURATION: 93 % | DIASTOLIC BLOOD PRESSURE: 56 MMHG | TEMPERATURE: 98.5 F | RESPIRATION RATE: 20 BRPM | WEIGHT: 156 LBS | BODY MASS INDEX: 28.71 KG/M2

## 2021-10-28 LAB
GLUCOSE BLD STRIP.AUTO-MCNC: 110 MG/DL (ref 65–100)
GLUCOSE BLD STRIP.AUTO-MCNC: 129 MG/DL (ref 65–100)
SERVICE CMNT-IMP: ABNORMAL
SERVICE CMNT-IMP: ABNORMAL

## 2021-10-28 PROCEDURE — 94640 AIRWAY INHALATION TREATMENT: CPT

## 2021-10-28 PROCEDURE — 97535 SELF CARE MNGMENT TRAINING: CPT

## 2021-10-28 PROCEDURE — 94760 N-INVAS EAR/PLS OXIMETRY 1: CPT

## 2021-10-28 PROCEDURE — 97530 THERAPEUTIC ACTIVITIES: CPT

## 2021-10-28 PROCEDURE — 82962 GLUCOSE BLOOD TEST: CPT

## 2021-10-28 PROCEDURE — 74011250637 HC RX REV CODE- 250/637: Performed by: FAMILY MEDICINE

## 2021-10-28 PROCEDURE — 74011250637 HC RX REV CODE- 250/637: Performed by: INTERNAL MEDICINE

## 2021-10-28 PROCEDURE — 74011000250 HC RX REV CODE- 250: Performed by: STUDENT IN AN ORGANIZED HEALTH CARE EDUCATION/TRAINING PROGRAM

## 2021-10-28 RX ORDER — POLYETHYLENE GLYCOL 3350 17 G/17G
17 POWDER, FOR SOLUTION ORAL
Qty: 1 PACKET | Refills: 11 | Status: SHIPPED
Start: 2021-10-28

## 2021-10-28 RX ADMIN — SODIUM CHLORIDE 10 ML: 9 INJECTION INTRAMUSCULAR; INTRAVENOUS; SUBCUTANEOUS at 05:09

## 2021-10-28 RX ADMIN — LEVOTHYROXINE SODIUM 25 MCG: 0.05 TABLET ORAL at 08:52

## 2021-10-28 RX ADMIN — POLYETHYLENE GLYCOL 3350 17 G: 17 POWDER, FOR SOLUTION ORAL at 08:54

## 2021-10-28 RX ADMIN — BUDESONIDE AND FORMOTEROL FUMARATE DIHYDRATE 2 PUFF: 160; 4.5 AEROSOL RESPIRATORY (INHALATION) at 08:08

## 2021-10-28 RX ADMIN — METOPROLOL SUCCINATE 50 MG: 50 TABLET, EXTENDED RELEASE ORAL at 08:52

## 2021-10-28 RX ADMIN — PANTOPRAZOLE SODIUM 40 MG: 40 TABLET, DELAYED RELEASE ORAL at 08:53

## 2021-10-28 RX ADMIN — NYSTATIN: 100000 POWDER TOPICAL at 08:54

## 2021-10-28 RX ADMIN — RIVAROXABAN 15 MG: 15 TABLET, FILM COATED ORAL at 08:53

## 2021-10-28 RX ADMIN — CYANOCOBALAMIN TAB 1000 MCG 1000 MCG: 1000 TAB at 08:51

## 2021-10-28 RX ADMIN — CETIRIZINE HYDROCHLORIDE 10 MG: 5 TABLET ORAL at 08:51

## 2021-10-28 RX ADMIN — Medication 400 MG: at 08:53

## 2021-10-28 NOTE — PROGRESS NOTES
ACUTE OT GOALS:  (Developed with and agreed upon by patient and/or caregiver.)  1. Patient will complete full body bathing and dressing with min A, additional time and adaptive equipment as needed. 2. Patient will complete toileting with min A.   3. Patient will tolerate 23 minutes of OT treatment with less than 3 rest breaks to increase activity tolerance for ADLs. 4. Patient will complete functional transfers with CGA and adaptive equipment as needed.      Timeframe: 7 visits     OCCUPATIONAL THERAPY: Daily Note OT Treatment Day # 9560 Zee Levin is a 80 y.o. female   PRIMARY DIAGNOSIS: Rhabdomyolysis  Rhabdomyolysis [M62.82]       Payor: [employer] / Plan: [place] [employer] / Product Type: Managed Care Medicare /   ASSESSMENT:     REHAB RECOMMENDATIONS: CURRENT LEVEL OF FUNCTION:  (Most Recently Demonstrated)   Recommendation to date pending progress:  Setting:   Short-term Rehab  Equipment:    To Be Determined Bathing:   Contact Guard Assistance  Dressing:   Minimal Assistance  Feeding/Grooming:   Contact Guard Assistance  Toileting:   Maximal Assistance  Functional Mobility:   Contact Guard Assistance     ASSESSMENT:  Ms. Raúl Henry presents in supine upon arrival. Pt agreeable to treatment and required min a with bed mobility. Pt completed functional mobility in the room with CGA and a rolling walker. Pt participated in grooming and hygiene activities while up in the chair. Pt left up in the chair with belongings in reach and chair alarm. Good effort. Continue POC. SUBJECTIVE:   Ms. Raúl Henry states, \"I lost my head band. \"    SOCIAL HISTORY/LIVING ENVIRONMENT:   Home Environment: Private residence  # Steps to Enter: 5  One/Two Story Residence: One story  Living Alone: Yes  Support Systems: Parent(s)    OBJECTIVE:     PAIN: VITAL SIGNS: LINES/DRAINS:   Pre Treatment: Pain Screen  Pain Scale 1: Numeric (0 - 10)  Pain Intensity 1: 0  Post Treatment: no change none  O2 Device: None (Room air)     ACTIVITIES OF DAILY LIVING: I Mod I S SBA CGA Min Mod Max Total NT Comments   BASIC ADLs:              Bathing/ Showering [] [] [] [] [] [] [] [] [] [x]    Toileting [] [] [] [] [] [] [] [x] [] []    Dressing [] [] [] [] [] [] [] [] [] [x]    Feeding [] [] [] [] [] [] [] [] [] [x]    Grooming [] [] [] [] [x] [x] [] [] [] []    Personal Device Care [] [] [] [] [] [] [] [] [] [x]    Functional Mobility [] [] [] [] [x] [] [] [] [] []    I=Independent, Mod I=Modified Independent, S=Supervision, SBA=Standby Assistance, CGA=Contact Guard Assistance,   Min=Minimal Assistance, Mod=Moderate Assistance, Max=Maximal Assistance, Total=Total Assistance, NT=Not Tested    MOBILITY: I Mod I S SBA CGA Min Mod Max Total  NT x2 Comments:   Supine to sit [] [] [] [] [] [x] [] [] [] [] []    Sit to supine [] [] [] [] [] [] [] [] [] [x] [] Left sitting in the chair    Sit to stand [] [] [] [] [x] [] [] [] [] [] []    Bed to chair [] [] [] [] [x] [] [] [] [] [] [] RW   I=Independent, Mod I=Modified Independent, S=Supervision, SBA=Standby Assistance, CGA=Contact Guard Assistance,   Min=Minimal Assistance, Mod=Moderate Assistance, Max=Maximal Assistance, Total=Total Assistance, NT=Not Tested    BALANCE: Good Fair+ Fair Fair- Poor NT Comments   Sitting Static [] [x] [] [] [] []    Sitting Dynamic [] [x] [] [] [] []              Standing Static [] [x] [] [] [] []    Standing Dynamic [] [x] [] [] [] []      PLAN:   FREQUENCY/DURATION: OT Plan of Care: 3 times/week for duration of hospital stay or until stated goals are met, whichever comes first.    TREATMENT:   TREATMENT:   ($$ Self Care/Home Management: 8-22 mins$$ Therapeutic Activity: 8-22 mins   )  Therapeutic Activity (10 Minutes): Therapeutic activity included Supine to Sit, Transfer Training, Ambulation on level ground, Sitting balance  and Standing balance to improve functional Mobility, Strength and Activity tolerance.   Self Care (14 Minutes): Self care including Grooming to increase independence and decrease level of assistance required.     TREATMENT GRID:  N/A    AFTER TREATMENT POSITION/PRECAUTIONS:  Alarm Activated, Chair, Needs within reach and RN notified    INTERDISCIPLINARY COLLABORATION:  RN/PCT and OT/KESSLER    TOTAL TREATMENT DURATION:  OT Patient Time In/Time Out  Time In: 1030  Time Out: [address] Chris Macario

## 2021-10-28 NOTE — PROGRESS NOTES
Pt with discharge orders this day. Pt approved by insurance to admit to SNF for STR. Plan remains pt to transition to [facility] for STR. Transport arranged for 2:30PM.  Pt and daughter aware with no voiced concerns. No additional CM needs at discharge. Milestones met. Care Management Interventions  PCP Verified by CM:  Yes  Mode of Transport at Discharge: BLS  Transition of Care Consult (CM Consult): SNF, Discharge Planning  Discharge Durable Medical Equipment: No  Physical Therapy Consult: Yes  Occupational Therapy Consult: Yes  Speech Therapy Consult: Yes  Support Systems: Parent(s)  Confirm Follow Up Transport: Other (see comment) (S)  The Plan for Transition of Care is Related to the Following Treatment Goals : SNF  for STR  The Patient and/or Patient Representative was Provided with a Choice of Provider and Agrees with the Discharge Plan?: Yes  Name of the Patient Representative Who was Provided with a Choice of Provider and Agrees with the Discharge Plan: Ms. Jeannette Eden (daughter)  Freedom of Choice List was Provided with Basic Dialogue that Supports the Patient's Individualized Plan of Care/Goals, Treatment Preferences and Shares the Quality Data Associated with the Providers?: Yes  [employer] Information Provided?: No  Discharge Location  Discharge Placement: Skilled nursing facility

## 2021-10-28 NOTE — DISCHARGE SUMMARY
Hospitalist Discharge Summary     Patient ID:  Dennis Villeda  [id number]  17 y.o.  2/24/1926  Admit date: 10/22/2021 12:31 PM  Discharge date and time: 10/28/2021  Attending: Juan Echols DO  PCP:  Efraín Mrianda MD  Treatment Team: Attending Provider: Juan Echols DO; Care Manager: Ronnie Moya; Utilization Review: Maryana Boucher; Physical Therapy Assistant: Priyank Fuller; Occupational Therapy Assistant: Chaya Thomas    Principal Diagnosis Rhabdomyolysis   Principal Problem:    Rhabdomyolysis (10/22/2021)    Active Problems:    Hypertension (7/1/2015)      Hyperlipidemia (7/1/2015)      Atrial fibrillation ([facility] [address] [address] (4/4/2017)        Patient is a 81 y/o female with PMH HTN, HLD, GERD, hypothyroidism, PE (2017), sCHF (EF 30% 2017), Atrial Fibrillation (on Xarelto), CKD III, DMII, dementia, possible Parkinsons who presented to ED after being found on floor past day after 's were called to perform welfare check. Patient states she fell and was unable to get back up. Daughter indicates multiple falls past week.      In ED, patient was febrile to 100.1 with CK of 831. Left Wrist XR with bony fragment projecting off the dorsal margin of the proximal carpal row. Findings could represent degenerative change, though a triquetral fracture could have this appearance as well. Left Hip XR with no acute fracture or dislocation. Interval History (10/28): Patient examined at bedside. No acute overnight events. Feeling well overall. Very hard of hearing. Denies being in any pain. Denies fevers/chills, chest pain, shortness of breath, abdominal pain. Eating Tootsie Rolls. Hospital Course:  Please refer to the admission H&P for details of presentation. In summary, the patient is admitted for rhabdomyolysis and generalized weakness after being found down on the ground at home. She was treated with IVF resuscitation with clinical improvement.  She has a very high risk for falls and difficulty with swallowing, she was seen by PT/OT and speech therapy. Daughter is opting for SNF at discharge with transition to LTC. She is currently hemodynamically stable for discharge. Patient has been on Xarelto for atrial fibrillation, discussed pros (prevent strokes and blood clots) and cons (major bleeding) with patient as she is very high risk for either scenario and daughter believes that she would fair better if she were not on Xarelto, this has been discontinued at discharge. Details of hospitalization discussed with patient and daughter at bedside who understand and agree with plan of care. Return precautions provided. All questions answered. Significant Diagnostic Studies:       Labs: Results:       Chemistry Recent Labs     10/26/21  0459   *      K 4.1   *   CO2 28   BUN 23   CREA 0.96   CA 8.9   AGAP 4*      CBC w/Diff Recent Labs     10/26/21  0459   WBC 4.7   RBC 3.15*   HGB 10.0*   HCT 31.6*         Cardiac Enzymes No results for input(s): CPK, CKND1, RUPAL in the last 72 hours. No lab exists for component: CKRMB, TROIP   Coagulation No results for input(s): PTP, INR, APTT, INREXT in the last 72 hours. Lipid Panel No results found for: CHOL, CHOLPOCT, CHOLX, CHLST, CHOLV, 970029, HDL, HDLP, LDL, LDLC, DLDLP, 527809, VLDLC, VLDL, TGLX, TRIGL, TRIGP, TGLPOCT, CHHD, CHHDX   BNP No results for input(s): BNPP in the last 72 hours. Liver Enzymes No results for input(s): TP, ALB, TBIL, AP in the last 72 hours. No lab exists for component: SGOT, GPT, DBIL   Thyroid Studies Lab Results   Component Value Date/Time    TSH 1.060 10/22/2021 01:00 PM            Discharge Exam:  Visit Vitals  BP (!) 141/61   Pulse 60   Temp 97.9 °F (36.6 °C)   Resp 20   Ht 5' 2\" (1.575 m)   Wt 70.8 kg (156 lb)   SpO2 90%   BMI 28.53 kg/m²     General appearance: alert, cooperative, no distress, appears stated age  Lungs: clear to auscultation bilaterally. Nonlabored.  No wheezing. Heart: regular rate and rhythm, S1, S2 normal  Abdomen: soft, non-tender. Bowel sounds normal. No masses,  no organomegaly  Extremities: no cyanosis or edema  Neurologic: Grossly normal  Psych: pleasant mood and affect    Disposition: SNF  Discharge Condition: stable  Patient Instructions:   Current Discharge Medication List      START taking these medications    Details   polyethylene glycol (MIRALAX) 17 gram packet Take 1 Packet by mouth daily as needed for Constipation. Qty: 1 Packet, Refills: 11  Start date: 10/28/2021         CONTINUE these medications which have NOT CHANGED    Details   acetaminophen (TYLENOL) 650 mg CR tablet Take 650 mg by mouth every six (6) hours as needed for Pain. cyanocobalamin (VITAMIN B-12) 1,000 mcg tablet Take 1,000 mcg by mouth daily. omeprazole (PRILOSEC) 20 mg capsule Take 20 mg by mouth daily. rivaroxaban (XARELTO) 15 mg tab tablet Take  by mouth daily. magnesium oxide (MAG-OX) 400 mg tablet Take 400 mg by mouth daily. levothyroxine (SYNTHROID) 25 mcg tablet Take  by mouth Daily (before breakfast). furosemide (LASIX) 40 mg tablet Take 1 Tab by mouth daily. Qty: 30 Tab, Refills: 6      metoprolol succinate (TOPROL-XL) 50 mg XL tablet Take 1 Tab by mouth two (2) times a day. Qty: 60 Tab, Refills: 6      fluticasone-vilanterol (BREO ELLIPTA) 100-25 mcg/dose inhaler Take 1 Puff by inhalation daily. docusate sodium (COLACE) 100 mg capsule Take 1 Cap by mouth two (2) times a day. Qty: 60 Cap, Refills: 11      CERTAVITE SENIOR-ANTIOXIDANT 0.4-300-250 mg-mcg-mcg tab Take 1 Tab by mouth daily. Qty: 90 Tab, Refills: 3      loratadine (CLARITIN) 10 mg tablet Take 1 Tab by mouth daily. Qty: 90 Tab, Refills: 3      Cholecalciferol, Vitamin D3, 50,000 unit cap Take  by mouth.      multivitamin (ONE A DAY) tablet Take 1 Tab by mouth daily.       sodium chloride (OCEAN) 0.65 % nasal spray 1 [place] by Both Nostrils route as needed for Congestion (Keep at bedside for PRN use.). Qty: 15 mL, Refills: 11         STOP taking these medications       senna (SENNA) 8.6 mg tablet Comments:   Reason for Stopping:         potassium chloride (KLOR-CON) 20 mEq packet Comments:   Reason for Stopping:         amiodarone (CORDARONE) 200 mg tablet Comments:   Reason for Stopping:               Activity: PT/OT Eval and Treat  Diet: per speech therapy recs  Wound Care: As directed    Follow-up  ·   With PCP within 1 week. Time spent to discharge patient 35 minutes  Signed:   Jose Ruiz DO  10/28/2021  11:19 AM

## 2021-10-28 NOTE — PROGRESS NOTES
END OF SHIFT NOTE:    INTAKE/OUTPUT  10/27 0701 - 10/28 0700  In: 960 [P.O.:960]  Out: -   Voiding: YES  Catheter: NO  Drain:              Flatus: Patient does have flatus present. Stool:  0 occurrences. Characteristics:  Stool Assessment  Stool Color: Brown  Stool Appearance: Soft, Formed  Stool Amount: Medium  Stool Source/Status: Rectum    Emesis: 0 occurrences. Characteristics:        VITAL SIGNS  Patient Vitals for the past 12 hrs:   Temp Pulse Resp BP SpO2   10/28/21 0659 97.9 °F (36.6 °C) 60 20 (!) 141/61 94 %   10/28/21 0451 98.4 °F (36.9 °C) 80 18 110/79 94 %   10/28/21 0044 98.4 °F (36.9 °C) 76 20 128/66 94 %   10/27/21 2111 -- -- -- -- 91 %   10/27/21 2000 98.6 °F (37 °C) 71 21 110/63 94 %       Pain Assessment  Pain Intensity 1: 0 (10/28/21 0207)  Pain Location 1: Back  Pain Intervention(s) 1: Back rub, Ambulation/Increased Activity, Distraction, Emotional support, Family Support, Nurse notified, Repositioned, Rest, Therapeutic presence, Therapeutic touch, Other (comment) (changed brief)  Patient Stated Pain Goal: 0    Ambulating  No    Shift report given to oncoming nurse at the bedside.     Mali Santana RN

## 2021-12-07 ENCOUNTER — HOSPITAL ENCOUNTER (OUTPATIENT)
Dept: LAB | Age: 86
Discharge: HOME | End: 2021-12-07

## 2021-12-07 LAB
ALBUMIN SERPL-MCNC: 2.6 G/DL (ref 3.2–4.6)
ALBUMIN/GLOB SERPL: 0.7 {RATIO} (ref 1.2–3.5)
ALP SERPL-CCNC: 60 U/L (ref 50–136)
ALT SERPL-CCNC: 21 U/L (ref 12–65)
ANION GAP SERPL CALC-SCNC: 7 MMOL/L (ref 7–16)
AST SERPL-CCNC: 21 U/L (ref 15–37)
BASOPHILS # BLD: 0 K/UL (ref 0–0.2)
BASOPHILS NFR BLD: 1 % (ref 0–2)
BILIRUB SERPL-MCNC: 0.5 MG/DL (ref 0.2–1.1)
BUN SERPL-MCNC: 32 MG/DL (ref 8–23)
CALCIUM SERPL-MCNC: 8.4 MG/DL (ref 8.3–10.4)
CHLORIDE SERPL-SCNC: 101 MMOL/L (ref 98–107)
CO2 SERPL-SCNC: 33 MMOL/L (ref 21–32)
CREAT SERPL-MCNC: 1.55 MG/DL (ref 0.6–1)
DIFFERENTIAL METHOD BLD: ABNORMAL
EOSINOPHIL # BLD: 0.1 K/UL (ref 0–0.8)
EOSINOPHIL NFR BLD: 1 % (ref 0.5–7.8)
ERYTHROCYTE [DISTWIDTH] IN BLOOD BY AUTOMATED COUNT: 12.9 % (ref 11.9–14.6)
GLOBULIN SER CALC-MCNC: 3.5 G/DL (ref 2.3–3.5)
GLUCOSE SERPL-MCNC: 82 MG/DL (ref 65–100)
HCT VFR BLD AUTO: 36.8 % (ref 35.8–46.3)
HGB BLD-MCNC: 10.9 G/DL (ref 11.7–15.4)
IMM GRANULOCYTES # BLD AUTO: 0 K/UL (ref 0–0.5)
IMM GRANULOCYTES NFR BLD AUTO: 0 % (ref 0–5)
LYMPHOCYTES # BLD: 1 K/UL (ref 0.5–4.6)
LYMPHOCYTES NFR BLD: 21 % (ref 13–44)
MCH RBC QN AUTO: 30.9 PG (ref 26.1–32.9)
MCHC RBC AUTO-ENTMCNC: 29.6 G/DL (ref 31.4–35)
MCV RBC AUTO: 104.2 FL (ref 79.6–97.8)
MONOCYTES # BLD: 0.5 K/UL (ref 0.1–1.3)
MONOCYTES NFR BLD: 9 % (ref 4–12)
NEUTS SEG # BLD: 3.4 K/UL (ref 1.7–8.2)
NEUTS SEG NFR BLD: 68 % (ref 43–78)
NRBC # BLD: 0 K/UL (ref 0–0.2)
PLATELET # BLD AUTO: 145 K/UL (ref 150–450)
PMV BLD AUTO: 11.3 FL (ref 9.4–12.3)
POTASSIUM SERPL-SCNC: 4.2 MMOL/L (ref 3.5–5.1)
PROT SERPL-MCNC: 6.1 G/DL (ref 6.3–8.2)
RBC # BLD AUTO: 3.53 M/UL (ref 4.05–5.2)
SODIUM SERPL-SCNC: 141 MMOL/L (ref 136–145)
WBC # BLD AUTO: 5 K/UL (ref 4.3–11.1)

## 2021-12-07 PROCEDURE — 80053 COMPREHEN METABOLIC PANEL: CPT

## 2021-12-07 PROCEDURE — 85025 COMPLETE CBC W/AUTO DIFF WBC: CPT

## 2022-03-18 PROBLEM — J45.909 ASTHMA: Status: ACTIVE | Noted: 2017-04-04

## 2022-03-18 PROBLEM — E87.70 VOLUME OVERLOAD: Status: ACTIVE | Noted: 2017-04-28

## 2022-03-18 PROBLEM — I50.21 ACUTE SYSTOLIC (CONGESTIVE) HEART FAILURE (HCC): Status: ACTIVE | Noted: 2017-05-04

## 2022-03-19 PROBLEM — J90 PLEURAL EFFUSION: Status: ACTIVE | Noted: 2017-04-04

## 2022-03-19 PROBLEM — Z66 DO NOT RESUSCITATE: Status: ACTIVE | Noted: 2017-04-07

## 2022-03-19 PROBLEM — M62.82 RHABDOMYOLYSIS: Status: ACTIVE | Noted: 2021-10-22

## 2022-03-19 PROBLEM — I26.99 PE (PULMONARY THROMBOEMBOLISM) (HCC): Status: ACTIVE | Noted: 2017-04-04

## 2022-03-19 PROBLEM — R10.9 ABDOMINAL PAIN: Status: ACTIVE | Noted: 2017-04-09

## 2022-03-19 PROBLEM — R53.81 DEBILITY: Status: ACTIVE | Noted: 2017-04-04

## 2022-03-19 PROBLEM — I48.91 ATRIAL FIBRILLATION (HCC): Status: ACTIVE | Noted: 2017-04-04

## 2022-03-19 PROBLEM — R09.02 HYPOXEMIA: Status: ACTIVE | Noted: 2017-04-04

## 2022-11-18 NOTE — PROGRESS NOTES
Problem: Falls - Risk of  Goal: *Absence of falls  Outcome: Progressing Towards Goal  Non-skid socks on; bed low and locked; call light in reach; bed alarm set Admission

## 2024-11-21 NOTE — PROGRESS NOTES
Bedside and Verbal shift change report given to self (oncoming nurse) by Kathe Correia RN (offgoing nurse). Report included the following information SBAR, Kardex, MAR and Recent Results. Blood transfusion completed. Detail Level: Simple Detail Level: Detailed Detail Level: Generalized Detail Level: Zone